# Patient Record
Sex: FEMALE | Race: BLACK OR AFRICAN AMERICAN | Employment: OTHER | URBAN - METROPOLITAN AREA
[De-identification: names, ages, dates, MRNs, and addresses within clinical notes are randomized per-mention and may not be internally consistent; named-entity substitution may affect disease eponyms.]

---

## 2017-01-17 ENCOUNTER — OFFICE VISIT (OUTPATIENT)
Dept: CARDIOLOGY CLINIC | Age: 72
End: 2017-01-17

## 2017-01-17 VITALS
WEIGHT: 216 LBS | OXYGEN SATURATION: 100 % | SYSTOLIC BLOOD PRESSURE: 132 MMHG | HEIGHT: 65 IN | BODY MASS INDEX: 35.99 KG/M2 | DIASTOLIC BLOOD PRESSURE: 76 MMHG

## 2017-01-17 DIAGNOSIS — R94.31 ABNORMAL EKG: ICD-10-CM

## 2017-01-17 DIAGNOSIS — R01.1 HEART MURMUR, SYSTOLIC: Primary | ICD-10-CM

## 2017-01-17 DIAGNOSIS — R93.1 ABNORMAL ECHOCARDIOGRAM: ICD-10-CM

## 2017-01-17 NOTE — MR AVS SNAPSHOT
Visit Information Date & Time Provider Department Dept. Phone Encounter #  
 1/17/2017  3:20 PM Benton Mazariegos, [facility] [id number] Follow-up Instructions Return in about 1 year (around 1/17/2018), or if symptoms worsen or fail to improve. Your Appointments 2/16/2017  1:00 PM  
Follow Up with Adiel Raymond MD  
[facility] (--) Appt Note: mary Jiménez [phone] [address] [address] [phone]  
  
   
 Jessy 57 [phone] [address] [address] Upcoming Health Maintenance Date Due DTaP/Tdap/Td series (1 - Tdap) 10/24/1967 ZOSTER VACCINE AGE 60> 10/24/2006 INFLUENZA AGE 9 TO ADULT 8/1/2016 Hepatitis C Screening 3/3/2017* Pneumococcal 65+ Low/Medium Risk (2 of 2 - PCV13) 3/3/2017 MEDICARE YEARLY EXAM 3/4/2017 BREAST CANCER SCRN MAMMOGRAM 10/15/2017 GLAUCOMA SCREENING Q2Y 1/4/2018 COLONOSCOPY 5/25/2021 *Topic was postponed. The date shown is not the original due date. Allergies as of 1/17/2017  Review Complete On: 1/17/2017 By: Benton Mazariegos, DO No Known Allergies Current Immunizations  Reviewed on 7/14/2016 Name Date Pneumococcal Polysaccharide (PPSV-23) 3/3/2016 Not reviewed this visit You Were Diagnosed With   
  
 Codes Comments Abnormal echocardiogram    -  Primary ICD-10-CM: R93.1 ICD-9-CM: 793.2 Abnormal EKG     ICD-10-CM: R94.31 
ICD-9-CM: 794.31 Benign hypertension     ICD-10-CM: I10 
ICD-9-CM: 401.1 Dyslipidemia     ICD-10-CM: E78.5 ICD-9-CM: 272.4 Vitals BP Pulse Height(growth percentile) Weight(growth percentile) SpO2 BMI  
 142/76 (P) 66 5' 4.5\" (1.638 m) 216 lb (98 kg) 100% 36.5 kg/m2 OB Status Smoking Status Hysterectomy Former Smoker BMI and BSA Data Body Mass Index Body Surface Area  
 36.5 kg/m 2 2.11 m 2 Preferred Pharmacy Pharmacy Name Phone [address] [phone] Your Updated Medication List  
  
   
This list is accurate as of: 1/17/17  4:22 PM.  Always use your most recent med list.  
  
  
  
  
 ADULT LOW DOSE ASPIRIN 81 mg tablet Generic drug:  aspirin delayed-release Take 1 Tab by mouth daily. cholecalciferol 1,000 unit Cap Commonly known as:  VITAMIN D3 Take 1,000 Units by mouth every other day. oxyCODONE-acetaminophen 5-325 mg per tablet Commonly known as:  PERCOCET One or two tab q 4 h prn pain  
  
 raloxifene 60 mg tablet Commonly known as:  EVISTA 60 mg daily. We Performed the Following AMB POC EKG ROUTINE W/ 12 LEADS, INTER & REP [99330 CPT(R)] Follow-up Instructions Return in about 1 year (around 1/17/2018), or if symptoms worsen or fail to improve. Introducing [facility] & HEALTH SERVICES! Anisa Weiss introduces [employer] patient portal. Now you can access parts of your medical record, email your doctor's office, and request medication refills online. 1. In your internet browser, go to [email or web address] 2. Click on the First Time User? Click Here link in the Sign In box. You will see the New Member Sign Up page. 3. Enter your [employer] Access Code exactly as it appears below. You will not need to use this code after youve completed the sign-up process. If you do not sign up before the expiration date, you must request a new code. · [employer] Access Code: [email or web address] Expires: 2/13/2017  9:52 AM 
 
4. Enter the last four digits of your Social Security Number (xxxx) and Date of Birth (mm/dd/yyyy) as indicated and click Submit. You will be taken to the next sign-up page. 5. Create a [employer] ID. This will be your [employer] login ID and cannot be changed, so think of one that is secure and easy to remember. 6. Create a [employer] password. You can change your password at any time. 7. Enter your Password Reset Question and Answer. This can be used at a later time if you forget your password. 8. Enter your e-mail address. You will receive e-mail notification when new information is available in [address] 9. Click Sign Up. You can now view and download portions of your medical record. 10. Click the Download Summary menu link to download a portable copy of your medical information. If you have questions, please visit the Frequently Asked Questions section of the [email or web address] website. Remember, [email or web address] is NOT to be used for urgent needs. For medical emergencies, dial 911. Now available from your iPhone and Android! Please provide this summary of care documentation to your next provider. Your primary care clinician is listed as RONA MIRANDA. If you have any questions after today's visit, please call [phone].

## 2017-01-17 NOTE — PROGRESS NOTES
1. Have you been to the ER, urgent care clinic since your last visit? Hospitalized since your last visit? No    2. Have you seen or consulted any other health care providers outside of the [address] [place] [address] since your last visit? Include any pap smears or colon screening.  No

## 2017-01-17 NOTE — PROGRESS NOTES
HPI: I saw Juan Diego Peoples in my office today in cardiovascular evaluation regarding her abnormal echocardiogram.  Ms. Zoya Lozoya is a pleasant 73-year-old -American female with history of having an abnormal echocardiogram and a soft systolic murmur for which she was referred to my associate Dr. Roque Cabezas. Dr. Roque Cabezas saw her on November 17, 2016 and thought that her heart murmur was simply a flow murmur and reviewing the echocardiogram of September 23, 2016 the only significant abnormality was moderate to severe left atrial enlargement of unclear etiology. He decided to repeat a limited echocardiogram which was completed on December 2, 2016 and suggested that there was only upper normal left atrial size at that time with mild mitral regurgitation with completely normal left ventricular function and in fact the left atrial size was not significantly enlarged as had  been suggested on the previous echo done in September 2016. The patient comes into the office today and relates that she is really doing quite well. She is not having any cardiovascular symptomatology at this time. Encounter Diagnoses   Name Primary?  Heart murmur, systolic ejection flow murmur Yes    Abnormal echocardiogram     Abnormal EKG         Discussion: This lady appears to be doing reasonably well at this juncture. She only has a very mildly abnormal echocardiogram with some borderline left atrial enlargement and mild mitral regurgitation and in fact her heart murmur is only heard at the base of the heart suggesting that it is a flow murmur and not related to the mitral regurgitation. Her latest lipid profile which was completed on September 23, 2016 showed a total cholesterol of 177 with triglycerides of 68, and HDL of 77, and LDL of 86.4, and a VLDL of 13.6 which is really only borderline hyperlipidemia and with a high HDL I do not think would be considered for treatment in a patient without any significant risk factors.     Her EKG shows some nonspecific EKG changes but nothing of concern and she tells me that historically she is never had any high blood pressure or any cardiac issues and I rechecked her blood pressure myself and got 132/76 so since she does not appear to have any significant cardiac issues I told her follow-up closely with Dr. Keny Vallecillo for general medical care and will simply plan to see her either annually or on an as-needed basis in the future. 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 EF 65%. No RWMA. LA size was upper limits of normal (decreased in size since prev study of 9/23/16). Mild MR. Past Surgical History   Procedure Laterality Date    Hx hysterectomy      Colonoscopy N/A 5/19/2016     COLONOSCOPY with polypectomy performed by Miguel Samayoa MD [facility] ENDOSCOPY       Current Outpatient Prescriptions   Medication Sig    aspirin delayed-release (ADULT LOW DOSE ASPIRIN) 81 mg tablet Take 1 Tab by mouth daily.  oxyCODONE-acetaminophen (PERCOCET) 5-325 mg per tablet One or two tab q 4 h prn pain    cholecalciferol (VITAMIN D3) 1,000 unit cap Take 1,000 Units by mouth every other day.  raloxifene (EVISTA) 60 mg tablet 60 mg daily. No current facility-administered medications for this visit. No Known Allergies    Social History :  Social History   Substance Use Topics    Smoking status: Former Smoker     Years: 13.00    Smokeless tobacco: Never Used      Comment: occasional, stopped at age 37    Alcohol use No        Family History: family history includes Hypertension in her mother; Prostate Cancer in her father.       Review of Systems:      Physical Exam:    The patient is a cooperative, alert, well developed, well nourished 80 y.o. black female who is in no acute distress at the time of the examination. Visit Vitals    /76    Pulse (P) 66    Ht 5' 4.5\" (1.638 m)    Wt 98 kg (216 lb)    SpO2 100%    BMI 36.5 kg/m2       HEENT: Conjuctiva white, mucosa moist, no pallor or cyanosis. NECK: Supple without masses, tenderness or thyromegaly. There was no jugular venous distention. Carotid are full bilaterally without bruits. CHEST: Symmetrical with good excursion. LUNGS: Clear to auscultation in all fields. HEART: The apex is not displaced. There were no lifts, thrills or heaves. There is anormal S1 and S2. There is a grade 2/6 harsh systolic ejection murmur heard along left sternal border with radiation to the base without appreciable murmurs, rubs, clicks, or gallops auscultated. ABDOMEN: Soft without masses, tenderness or organomegaly. EXTREMITIES: Full peripheral pulses without peripheral edema. INTEGUMENT: Warm and dry   NEUROLOGICAL: The patient is oriented x 3 with motor function grossly intact. Review of Data: See PMH and Cardiology and Imaging sections for cardiac testing  Lab Results   Component Value Date/Time    Cholesterol, total 177 09/23/2016 12:25 PM    HDL Cholesterol 77 09/23/2016 12:25 PM    LDL, calculated 86.4 09/23/2016 12:25 PM    Triglyceride 68 09/23/2016 12:25 PM    CHOL/HDL Ratio 2.3 09/23/2016 12:25 PM       Results for orders placed or performed in visit on 01/17/17   AMB POC EKG ROUTINE W/ 12 LEADS, INTER & REP     Status: None    Narrative    Normal sinus rhythm, rate 66. Some diffuse inferolateral and high lateral ST-T flattening. This EKG is similar to the EKG of November 17, 2016. Ceci Portillo D.O., F.A.C.C. Cardiovascular Specialists  [facility] [place]  [address]      PLEASE NOTE:  This document has been produced using voice recognition software. Unrecognized errors in transcription may be present.

## 2017-02-16 ENCOUNTER — OFFICE VISIT (OUTPATIENT)
Dept: FAMILY MEDICINE CLINIC | Age: 72
End: 2017-02-16

## 2017-02-16 VITALS
HEIGHT: 65 IN | TEMPERATURE: 97.6 F | OXYGEN SATURATION: 98 % | HEART RATE: 69 BPM | SYSTOLIC BLOOD PRESSURE: 134 MMHG | DIASTOLIC BLOOD PRESSURE: 85 MMHG | WEIGHT: 213 LBS | BODY MASS INDEX: 35.49 KG/M2

## 2017-02-16 DIAGNOSIS — R93.1 ABNORMAL ECHOCARDIOGRAM: ICD-10-CM

## 2017-02-16 NOTE — PROGRESS NOTES
Chief Complaint   Patient presents with    Hypertension     forgot to get BP reading     1. Have you been to the ER, urgent care clinic since your last visit? Hospitalized since your last visit? No    2. Have you seen or consulted any other health care providers outside of the [address] [place] [address] since your last visit? Include any pap smears or colon screening. No  Pt declined influenza vaccine, discussed medicare wellness visit with pt, due 3/2016. Pt states she will make apt.

## 2017-02-16 NOTE — PROGRESS NOTES
HISTORY OF PRESENT ILLNESS  Danii Sargent is a 80 y.o. female. Chief Complaint   Patient presents with    Hypertension     forgot to get BP reading   no htn dx or meds, blood pressure normal at cardiology appt, had ekg and told did not need anything else done for echo/LAE. 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 EF 65%. No RWMA. LA size was upper limits of normal (decreased in size since prev study of 9/23/16). Mild MR. Current Outpatient Prescriptions   Medication Sig Dispense Refill    aspirin delayed-release (ADULT LOW DOSE ASPIRIN) 81 mg tablet Take 1 Tab by mouth daily. 30 Tab prn    cholecalciferol (VITAMIN D3) 1,000 unit cap Take 1,000 Units by mouth every other day.  raloxifene (EVISTA) 60 mg tablet 60 mg daily. 0    oxyCODONE-acetaminophen (PERCOCET) 5-325 mg per tablet One or two tab q 4 h prn pain 40 Tab 0     No Known Allergies    ROS Respiratory: Negative for shortness of breath. Cardiovascular: Negative for chest pain. Genitourinary: Negative for frequency. Neurological: Negative for dizziness and headaches. Visit Vitals    /85    Pulse 69    Temp 97.6 °F (36.4 °C) (Oral)    Ht 5' 4.5\" (1.638 m)    Wt 213 lb (96.6 kg)    SpO2 98%    BMI 36 kg/m2       Physical Exam  Nursing note and vitals reviewed. Constitutional: She is oriented to person, place, and time. She appears well-developed and well-nourished. No distress. HENT:   Mouth/Throat: Oropharynx is clear and moist.   Neck: No JVD present. No thyromegaly present. Cardiovascular: Normal rate, regular rhythm and normal heart sounds. Pulmonary/Chest: Effort normal and breath sounds normal. No respiratory distress. She has no wheezes. She has no rales. Musculoskeletal: She exhibits no edema.    Lymphadenopathy: She has no cervical adenopathy. Neurological: She is alert and oriented to person, place, and time. Coordination normal.   Psychiatric: She has a normal mood and affect. Her behavior is normal.    ASSESSMENT and PLAN    ICD-10-CM ICD-9-CM    1. Elevated blood pressure I10 401.9    2. Abnormal echocardiogram R93.1 793.2    cleared by cardiology. , blood pressure normal today monitor still   Follow-up Disposition:  Return in about 2 months (around 4/16/2017) for AWV.

## 2017-02-16 NOTE — MR AVS SNAPSHOT
Visit Information Date & Time Provider Department Dept. Phone Encounter #  
 2/16/2017  1:00 PM Ariana Cortes [id number] Follow-up Instructions Return in about 2 months (around 4/16/2017) for AWV. Upcoming Health Maintenance Date Due DTaP/Tdap/Td series (1 - Tdap) 10/24/1967 ZOSTER VACCINE AGE 60> 10/24/2006 Hepatitis C Screening 3/3/2017* Pneumococcal 65+ Low/Medium Risk (2 of 2 - PCV13) 3/3/2017 MEDICARE YEARLY EXAM 3/4/2017 BREAST CANCER SCRN MAMMOGRAM 10/15/2017 GLAUCOMA SCREENING Q2Y 1/4/2018 COLONOSCOPY 5/25/2021 *Topic was postponed. The date shown is not the original due date. Allergies as of 2/16/2017  Review Complete On: 2/16/2017 By: Luiz Deleon LPN No Known Allergies Current Immunizations  Reviewed on 7/14/2016 Name Date Pneumococcal Polysaccharide (PPSV-23) 3/3/2016 Not reviewed this visit You Were Diagnosed With   
  
 Codes Comments Elevated blood pressure    -  Primary ICD-10-CM: I10 
ICD-9-CM: 401.9 Abnormal echocardiogram     ICD-10-CM: R93.1 ICD-9-CM: 793.2 Vitals BP Pulse Temp Height(growth percentile) Weight(growth percentile) SpO2  
 134/85 69 97.6 °F (36.4 °C) (Oral) 5' 4.5\" (1.638 m) 213 lb (96.6 kg) 98% BMI OB Status Smoking Status 39 kg/m2 Hysterectomy Former Smoker BMI and BSA Data Body Mass Index Body Surface Area  
 36 kg/m 2 2.1 m 2 Preferred Pharmacy Pharmacy Name Phone [address], [address] [place] [address] [phone] Your Updated Medication List  
  
   
This list is accurate as of: 2/16/17  1:40 PM.  Always use your most recent med list.  
  
  
  
  
 ADULT LOW DOSE ASPIRIN 81 mg tablet Generic drug:  aspirin delayed-release Take 1 Tab by mouth daily. cholecalciferol 1,000 unit Cap Commonly known as:  VITAMIN D3  
 Take 1,000 Units by mouth every other day. oxyCODONE-acetaminophen 5-325 mg per tablet Commonly known as:  PERCOCET One or two tab q 4 h prn pain  
  
 raloxifene 60 mg tablet Commonly known as:  EVISTA 60 mg daily. Follow-up Instructions Return in about 2 months (around 4/16/2017) for AWV. Patient Instructions Please contact our office if you have any questions about your visit today. Introducing [facility] & HEALTH SERVICES! Dee Amado introduces [employer] patient portal. Now you can access parts of your medical record, email your doctor's office, and request medication refills online. 1. In your internet browser, go to [email or web address] 2. Click on the First Time User? Click Here link in the Sign In box. You will see the New Member Sign Up page. 3. Enter your [employer] Access Code exactly as it appears below. You will not need to use this code after youve completed the sign-up process. If you do not sign up before the expiration date, you must request a new code. · [employer] Access Code: [email or web address] Expires: 5/14/2017 11:28 AM 
 
4. Enter the last four digits of your Social Security Number (xxxx) and Date of Birth (mm/dd/yyyy) as indicated and click Submit. You will be taken to the next sign-up page. 5. Create a H[employer]ja ID. This will be your [employer] login ID and cannot be changed, so think of one that is secure and easy to remember. 6. Create a [employer]ja password. You can change your password at any time. 7. Enter your Password Reset Question and Answer. This can be used at a later time if you forget your password. 8. Enter your e-mail address. You will receive e-mail notification when new information is available in [address] 9. Click Sign Up. You can now view and download portions of your medical record. 10. Click the Download Summary menu link to download a portable copy of your medical information. If you have questions, please visit the Frequently Asked Questions section of the [employer]Gabt website. Remember, [employer] is NOT to be used for urgent needs. For medical emergencies, dial 911. Now available from your iPhone and Android! Please provide this summary of care documentation to your next provider. Your primary care clinician is listed as RONA MIRANDA. If you have any questions after today's visit, please call [phone].

## 2017-03-13 ENCOUNTER — OFFICE VISIT (OUTPATIENT)
Dept: FAMILY MEDICINE CLINIC | Age: 72
End: 2017-03-13

## 2017-03-13 VITALS
DIASTOLIC BLOOD PRESSURE: 72 MMHG | HEART RATE: 67 BPM | TEMPERATURE: 97.3 F | WEIGHT: 217.5 LBS | BODY MASS INDEX: 36.24 KG/M2 | HEIGHT: 65 IN | SYSTOLIC BLOOD PRESSURE: 126 MMHG | RESPIRATION RATE: 16 BRPM

## 2017-03-13 DIAGNOSIS — H65.92 LEFT OTITIS MEDIA WITH EFFUSION: Primary | ICD-10-CM

## 2017-03-13 DIAGNOSIS — H92.02 OTALGIA, LEFT: ICD-10-CM

## 2017-03-13 RX ORDER — AMOXICILLIN AND CLAVULANATE POTASSIUM 875; 125 MG/1; MG/1
1 TABLET, FILM COATED ORAL EVERY 12 HOURS
Qty: 20 TAB | Refills: 0 | Status: SHIPPED | OUTPATIENT
Start: 2017-03-13 | End: 2017-03-23

## 2017-03-13 NOTE — PROGRESS NOTES
HISTORY OF PRESENT ILLNESS  Rubio Balderas is a 80 y.o. female. Chief Complaint   Patient presents with   [facility] Follow Up     Seen at [facility] on 3/11/17 for left ear pain that was radiating to the base of her 'skull' causing pain to back of her head. Still have the left ear throbbing. 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 EF 65%. No RWMA. LA size was upper limits of normal (decreased in size since prev study of 9/23/16). Mild MR. Current Outpatient Prescriptions   Medication Sig Dispense Refill    aspirin delayed-release (ADULT LOW DOSE ASPIRIN) 81 mg tablet Take 1 Tab by mouth daily. 30 Tab prn    cholecalciferol (VITAMIN D3) 1,000 unit cap Take 1,000 Units by mouth every other day.  raloxifene (EVISTA) 60 mg tablet 60 mg daily. 0     No Known Allergies    Review of Systems   Constitutional: Negative for chills and fever. HENT: Positive for ear pain. Negative for congestion and ear discharge. Respiratory: Negative for cough. Neurological: Positive for headaches. Visit Vitals    /72 (BP 1 Location: Right arm, BP Patient Position: Sitting)    Pulse 67    Temp 97.3 °F (36.3 °C) (Oral)    Resp 16    Ht 5' 4.5\" (1.638 m)    Wt 217 lb 8 oz (98.7 kg)    BMI 36.76 kg/m2       Physical Exam   Constitutional: She appears well-developed and well-nourished. No distress. HENT:   Right Ear: External ear normal. Tympanic membrane is not injected. No middle ear effusion. Left Ear: External ear normal. Tympanic membrane is injected. A middle ear effusion is present.    Mouth/Throat: Posterior oropharyngeal erythema present. No oropharyngeal exudate. Eyes: Conjunctivae and EOM are normal.   Cardiovascular: Normal rate, regular rhythm and normal heart sounds. Pulmonary/Chest: Effort normal and breath sounds normal. No respiratory distress. She has no wheezes. She has no rales. Abdominal: Soft. Bowel sounds are normal. She exhibits no distension. There is no tenderness. There is no rebound. Lymphadenopathy:     She has cervical adenopathy. Skin: No pallor. Nursing note and vitals reviewed. ASSESSMENT and PLAN    ICD-10-CM ICD-9-CM    1. Left otitis media with effusion H66.92 381.4 amoxicillin-clavulanate (AUGMENTIN) 875-125 mg per tablet  Also recommend Allegra D 12 hour WITH CAUTION FOR HBP and flonase as well     2. Otalgia, left H92.02 388.70    Follow-up Disposition:  Return in about 1 week (around 3/20/2017).

## 2017-03-13 NOTE — PROGRESS NOTES
Chief Complaint   Patient presents with   [facility] Follow Up     Seen at [facility] on 3/11/17 for left ear pain that was radiating to the base of her 'skull' causing pain to back of her head. Still have the left ear throbbing. Health Maintenance reviewed     1. Have you been to the ER, urgent care clinic since your last visit? Hospitalized since your last visit? Yes When: 3/17 Where: [facility] Reason for visit: ear pain    2. Have you seen or consulted any other health care providers outside of the [address] [place] [address] since your last visit? Include any pap smears or colon screening.  No

## 2017-03-13 NOTE — MR AVS SNAPSHOT
Visit Information Date & Time Provider Department Dept. Phone Encounter #  
 3/13/2017  3:15 PM Paco Guzman MD [address] [phone] [id number] Follow-up Instructions Return in about 1 week (around 3/20/2017). Your Appointments 4/13/2017  2:00 PM  
Office Visit with Paco Guzman MD  
[address] (--) Appt Note: Medicare Wellness eJssy Mcadams [address] [phone]  
  
   
 Jessy Sterlingroselynnasrin [address] Upcoming Health Maintenance Date Due Hepatitis C Screening 1945 DTaP/Tdap/Td series (1 - Tdap) 10/24/1967 ZOSTER VACCINE AGE 60> 10/24/2006 Pneumococcal 65+ Low/Medium Risk (2 of 2 - PCV13) 3/3/2017 MEDICARE YEARLY EXAM 3/4/2017 BREAST CANCER SCRN MAMMOGRAM 10/15/2017 GLAUCOMA SCREENING Q2Y 1/4/2018 COLONOSCOPY 5/25/2021 Allergies as of 3/13/2017  Review Complete On: 3/13/2017 By: Paco Guzman MD  
 No Known Allergies Current Immunizations  Reviewed on 7/14/2016 Name Date Pneumococcal Polysaccharide (PPSV-23) 3/3/2016 Not reviewed this visit You Were Diagnosed With   
  
 Codes Comments Left otitis media with effusion    -  Primary ICD-10-CM: Z22.83 
ICD-9-CM: 381.4 Otalgia, left     ICD-10-CM: H92.02 
ICD-9-CM: 388.70 Vitals BP Pulse Temp Resp Height(growth percentile) Weight(growth percentile) 126/72 (BP 1 Location: Right arm, BP Patient Position: Sitting) 67 97.3 °F (36.3 °C) (Oral) 16 5' 4.5\" (1.638 m) 217 lb 8 oz (98.7 kg) BMI OB Status Smoking Status 36.76 kg/m2 Hysterectomy Former Smoker BMI and BSA Data Body Mass Index Body Surface Area  
 36.76 kg/m 2 2.12 m 2 Preferred Pharmacy Pharmacy Name Phone [address] [phone] Your Updated Medication List  
  
   
 This list is accurate as of: 3/13/17  4:08 PM.  Always use your most recent med list.  
  
  
  
  
 ADULT LOW DOSE ASPIRIN 81 mg tablet Generic drug:  aspirin delayed-release Take 1 Tab by mouth daily. amoxicillin-clavulanate 875-125 mg per tablet Commonly known as:  AUGMENTIN Take 1 Tab by mouth every twelve (12) hours for 10 days. cholecalciferol 1,000 unit Cap Commonly known as:  VITAMIN D3 Take 1,000 Units by mouth every other day. raloxifene 60 mg tablet Commonly known as:  EVISTA 60 mg daily. Prescriptions Sent to Pharmacy Refills  
 amoxicillin-clavulanate (AUGMENTIN) 875-125 mg per tablet 0 Sig: Take 1 Tab by mouth every twelve (12) hours for 10 days. Class: Normal  
 Pharmacy: [facility], [address] [place] [address] #: [phone] Route: Oral  
  
Follow-up Instructions Return in about 1 week (around 3/20/2017). Patient Instructions Please contact our office if you have any questions about your visit today. Introducing [facility] & HEALTH SERVICES! Neno Blair introduces Active Circle patient portal. Now you can access parts of your medical record, email your doctor's office, and request medication refills online. 1. In your internet browser, go to [email or web address] 2. Click on the First Time User? Click Here link in the Sign In box. You will see the New Member Sign Up page. 3. Enter your Active Circle Access Code exactly as it appears below. You will not need to use this code after youve completed the sign-up process. If you do not sign up before the expiration date, you must request a new code. · Active Circle Access Code: [email or web address] Expires: 5/14/2017 12:28 PM 
 
4. Enter the last four digits of your Social Security Number (xxxx) and Date of Birth (mm/dd/yyyy) as indicated and click Submit. You will be taken to the next sign-up page. 5. Create a DataCrowd ID. This will be your DataCrowd login ID and cannot be changed, so think of one that is secure and easy to remember. 6. Create a DataCrowd password. You can change your password at any time. 7. Enter your Password Reset Question and Answer. This can be used at a later time if you forget your password. 8. Enter your e-mail address. You will receive e-mail notification when new information is available in [address] 9. Click Sign Up. You can now view and download portions of your medical record. 10. Click the Download Summary menu link to download a portable copy of your medical information. If you have questions, please visit the Frequently Asked Questions section of the DataCrowd website. Remember, DataCrowd is NOT to be used for urgent needs. For medical emergencies, dial 911. Now available from your iPhone and Android! Please provide this summary of care documentation to your next provider. Your primary care clinician is listed as RONA MIRANDA. If you have any questions after today's visit, please call [phone].

## 2017-03-20 ENCOUNTER — OFFICE VISIT (OUTPATIENT)
Dept: FAMILY MEDICINE CLINIC | Age: 72
End: 2017-03-20

## 2017-03-20 VITALS
BODY MASS INDEX: 36.07 KG/M2 | HEART RATE: 60 BPM | TEMPERATURE: 97.9 F | RESPIRATION RATE: 20 BRPM | DIASTOLIC BLOOD PRESSURE: 77 MMHG | HEIGHT: 65 IN | WEIGHT: 216.5 LBS | SYSTOLIC BLOOD PRESSURE: 137 MMHG

## 2017-03-20 DIAGNOSIS — H65.92 LEFT OTITIS MEDIA WITH EFFUSION: Primary | ICD-10-CM

## 2017-03-20 DIAGNOSIS — H92.03 OTALGIA OF BOTH EARS: ICD-10-CM

## 2017-03-20 NOTE — PROGRESS NOTES
Chief Complaint   Patient presents with    Follow-up     1 week f/u on otitis. Patient states ear is better but she now have a 'mild discomfort' to the back of her head on the right side. 1. Have you been to the ER, urgent care clinic since your last visit? Hospitalized since your last visit? No    2. Have you seen or consulted any other health care providers outside of the [address] [place] [address] since your last visit? Include any pap smears or colon screening.  No

## 2017-03-20 NOTE — MR AVS SNAPSHOT
Visit Information Date & Time Provider Department Dept. Phone Encounter #  
 3/20/2017  1:00 PM Corine Reilly [facility] [phone] [id number] Your Appointments 4/13/2017  2:00 PM  
Office Visit with Corine Reilly MD  
[facility] (--) Appt Note: Medicare Wellness [id number] [address] [address] [phone]  
  
   
 Jessy 57 [id number] [address] [address] Upcoming Health Maintenance Date Due Hepatitis C Screening 1945 DTaP/Tdap/Td series (1 - Tdap) 10/24/1967 ZOSTER VACCINE AGE 60> 10/24/2006 Pneumococcal 65+ Low/Medium Risk (2 of 2 - PCV13) 3/3/2017 MEDICARE YEARLY EXAM 3/4/2017 BREAST CANCER SCRN MAMMOGRAM 10/15/2017 GLAUCOMA SCREENING Q2Y 1/4/2018 COLONOSCOPY 5/25/2021 Allergies as of 3/20/2017  Review Complete On: 3/13/2017 By: Corine Reilly MD  
 No Known Allergies Current Immunizations  Reviewed on 7/14/2016 Name Date Pneumococcal Polysaccharide (PPSV-23) 3/3/2016 Not reviewed this visit You Were Diagnosed With   
  
 Codes Comments Left otitis media with effusion    -  Primary ICD-10-CM: X24.46 
ICD-9-CM: 381.4 Otalgia of both ears     ICD-10-CM: H92.03 
ICD-9-CM: 388.70 Vitals BP Pulse Temp Resp Height(growth percentile) Weight(growth percentile)  
 137/77 (BP 1 Location: Right arm, BP Patient Position: Sitting) 60 97.9 °F (36.6 °C) (Oral) 20 5' 4.5\" (1.638 m) 216 lb 8 oz (98.2 kg) BMI OB Status Smoking Status 36.59 kg/m2 Hysterectomy Former Smoker BMI and BSA Data Body Mass Index Body Surface Area  
 36.59 kg/m 2 2.11 m 2 Preferred Pharmacy Pharmacy Name Phone [address], [address] [place] [address] [phone] Your Updated Medication List  
  
   
This list is accurate as of: 3/20/17  1:39 PM.  Always use your most recent med list.  
  
  
  
  
 ADULT LOW DOSE ASPIRIN 81 mg tablet Generic drug:  aspirin delayed-release Take 1 Tab by mouth daily. amoxicillin-clavulanate 875-125 mg per tablet Commonly known as:  AUGMENTIN Take 1 Tab by mouth every twelve (12) hours for 10 days. cholecalciferol 1,000 unit Cap Commonly known as:  VITAMIN D3 Take 1,000 Units by mouth every other day. raloxifene 60 mg tablet Commonly known as:  EVISTA 60 mg daily. We Performed the Following REFERRAL TO ENT-OTOLARYNGOLOGY [ZTT63 Custom] Comments:  
 Please evaluate patient for otalgia, AR. Referral Information Referral ID Referred By Referred To  
  
 [id number] Rachael MIRANDA MD   
   [address] [place] [address] [address] [place], [facility] [address] Phone: [phone] Fax: [phone] Visits Status Start Date End Date 1 New Request 3/20/17 3/20/18 If your referral has a status of pending review or denied, additional information will be sent to support the outcome of this decision. Patient Instructions Please contact our office if you have any questions about your visit today. Introducing \[facility]\"" & HEALTH SERVICES! Patrick Booth introduces BabyGlowz patient portal. Now you can access parts of your medical record, email your doctor's office, and request medication refills online. 1. In your internet browser, go to [email or web address] 2. Click on the First Time User? Click Here link in the Sign In box. You will see the New Member Sign Up page. 3. Enter your BabyGlowz Access Code exactly as it appears below. You will not need to use this code after youve completed the sign-up process. If you do not sign up before the expiration date, you must request a new code. · BabyGlowz Access Code: [email or web address] Expires: 5/14/2017 12:28 PM 
 
4.  Enter the last four digits of your Social Security Number (xxxx) and Date of Birth (mm/dd/yyyy) as indicated and click Submit. You will be taken to the next sign-up page. 5. Create a PhotoMania ID. This will be your PhotoMania login ID and cannot be changed, so think of one that is secure and easy to remember. 6. Create a PhotoMania password. You can change your password at any time. 7. Enter your Password Reset Question and Answer. This can be used at a later time if you forget your password. 8. Enter your e-mail address. You will receive e-mail notification when new information is available in [address] 9. Click Sign Up. You can now view and download portions of your medical record. 10. Click the Download Summary menu link to download a portable copy of your medical information. If you have questions, please visit the Frequently Asked Questions section of the PhotoMania website. Remember, PhotoMania is NOT to be used for urgent needs. For medical emergencies, dial 911. Now available from your iPhone and Android! Please provide this summary of care documentation to your next provider. Your primary care clinician is listed as RONA MIRANDA. If you have any questions after today's visit, please call [phone].

## 2017-06-28 ENCOUNTER — OFFICE VISIT (OUTPATIENT)
Dept: FAMILY MEDICINE CLINIC | Age: 72
End: 2017-06-28

## 2017-06-28 VITALS
DIASTOLIC BLOOD PRESSURE: 80 MMHG | HEIGHT: 65 IN | HEART RATE: 63 BPM | SYSTOLIC BLOOD PRESSURE: 129 MMHG | RESPIRATION RATE: 16 BRPM | TEMPERATURE: 96.8 F | BODY MASS INDEX: 35.82 KG/M2 | WEIGHT: 215 LBS

## 2017-06-28 DIAGNOSIS — Z00.00 ROUTINE GENERAL MEDICAL EXAMINATION AT A HEALTH CARE FACILITY: Primary | ICD-10-CM

## 2017-06-28 DIAGNOSIS — L72.3 SEBACEOUS CYST: ICD-10-CM

## 2017-06-28 DIAGNOSIS — D12.6 TUBULAR ADENOMA OF COLON: ICD-10-CM

## 2017-06-28 DIAGNOSIS — Z13.6 ENCOUNTER FOR SCREENING FOR CARDIOVASCULAR DISORDERS: ICD-10-CM

## 2017-06-28 DIAGNOSIS — Z51.81 MEDICATION MONITORING ENCOUNTER: ICD-10-CM

## 2017-06-28 DIAGNOSIS — Z13.39 SCREENING FOR ALCOHOLISM: ICD-10-CM

## 2017-06-28 DIAGNOSIS — Z71.89 ADVANCED DIRECTIVES, COUNSELING/DISCUSSION: ICD-10-CM

## 2017-06-28 DIAGNOSIS — E55.9 VITAMIN D DEFICIENCY: ICD-10-CM

## 2017-06-28 DIAGNOSIS — R03.0 ELEVATED BLOOD PRESSURE READING: ICD-10-CM

## 2017-06-28 DIAGNOSIS — Z13.31 SCREENING FOR DEPRESSION: ICD-10-CM

## 2017-06-28 DIAGNOSIS — Z13.220 SCREENING CHOLESTEROL LEVEL: ICD-10-CM

## 2017-06-28 DIAGNOSIS — Z12.11 SCREEN FOR COLON CANCER: ICD-10-CM

## 2017-06-28 DIAGNOSIS — Z71.89 ACP (ADVANCE CARE PLANNING): ICD-10-CM

## 2017-06-28 DIAGNOSIS — Z11.59 NEED FOR HEPATITIS C SCREENING TEST: ICD-10-CM

## 2017-06-28 DIAGNOSIS — R05.9 COUGH: ICD-10-CM

## 2017-06-28 RX ORDER — CEPHALEXIN 500 MG/1
500 CAPSULE ORAL 3 TIMES DAILY
Qty: 30 CAP | Refills: 0 | Status: SHIPPED | OUTPATIENT
Start: 2017-06-28 | End: 2017-07-08

## 2017-06-28 NOTE — PATIENT INSTRUCTIONS
Please contact our office if you have any questions about your visit today. Medicare Part B Preventive Services Limitations Recommendation Scheduled   Bone Mass Measurement  (age 72 & older, biennial) Requires diagnosis related to osteoporosis or estrogen deficiency. Biennial benefit unless patient has history of long-term glucocorticoid tx or baseline is needed because initial test was by other method     Cardiovascular Screening Blood Tests (every 5 years)  Total cholesterol, HDL, Triglycerides Order as a panel if possible     Colorectal Cancer Screening  -Fecal occult blood test (annual)  -Flexible sigmoidoscopy (5y)  -Screening colonoscopy (10y)  -Barium Enema      Counseling to Prevent Tobacco Use (up to 8 sessions per year)  - Counseling greater than 3 and up to 10 minutes  - Counseling greater than 10 minutes Patients must be asymptomatic of tobacco-related conditions to receive as preventive service     Diabetes Screening Tests (at least every 3 years, Medicare covers annually or at 6-month intervals for prediabetic patients)    Fasting blood sugar (FBS) or glucose tolerance test (GTT) Patient must be diagnosed with one of the following:  -Hypertension, Dyslipidemia, obesity, previous impaired FBS or GTT  Or any two of the following: overweight, FH of diabetes, age ? 72, history of gestational diabetes, birth of baby weighing more than 9 pounds     Diabetes Self-Management Training (DSMT) (no USPSTF recommendation) Requires referral by treating physician for patient with diabetes or renal disease. 10 hours of initial DSMT session of no less than 30 minutes each in a continuous 12-month period. 2 hours of follow-up DSMT in subsequent years.      Glaucoma Screening (no USPSTF recommendation) Diabetes mellitus, family history, , age 48 or over,  American, age 72 or over     Human Immunodeficiency Virus (HIV) Screening (annually for increased risk patients)  HIV-1 and HIV-2 by EIA, PETE, rapid antibody test, or oral mucosa transudate Patient must be at increased risk for HIV infection per USPSTF guidelines or pregnant. Tests covered annually for patients at increased risk. Pregnant patients may receive up to 3 test during pregnancy. Medical Nutrition Therapy (MNT) (for diabetes or renal disease not recommended schedule) Requires referral by treating physician for patient with diabetes or renal disease. Can be provided in same year as diabetes self-management training (DSMT), and CMS recommends medical nutrition therapy take place after DSMT. Up to 3 hours for initial year and 2 hours in subsequent years. Shingles Vaccination A shingles vaccine is also recommended once in a lifetime after age 61     Seasonal Influenza Vaccination (annually)      Pneumococcal Vaccination (once after 72)      Hepatitis B Vaccinations (if medium/high risk) Medium/high risk factors:  End-stage renal disease,  Hemophiliacs who received Factor VIII or IX concentrates, Clients of institutions for the mentally retarded, Persons who live in the same house as a HepB virus carrier, Homosexual men, Illicit injectable drug abusers. Screening Mammography (biennial age 54-69) Annually (age 36 or over)     Screening Pap Tests and Pelvic Examination (up to age 79 and after 79 if unknown history or abnormal study last 10 years) Every 25 months except high risk     Ultrasound Screening for Abdominal Aortic Aneurysm (AAA) (once) Patient must be referred through [facility] and not have had a screening for abdominal aortic aneurysm before under Medicare.   Limited to patients who meet one of the following criteria:  - Men who are 73-68 years old and have smoked more than 100 cigarettes in their lifetime.  -Anyone with a FH of AAA  -Anyone recommended for screening by USPSTF

## 2017-06-28 NOTE — MR AVS SNAPSHOT
Visit Information Date & Time Provider Department Dept. Phone Encounter #  
 6/28/2017  8:30 AM Giselle Urias MD [address] [phone] [id number] Follow-up Instructions Return in about 3 months (around 9/28/2017). Upcoming Health Maintenance Date Due Hepatitis C Screening 1945 DTaP/Tdap/Td series (1 - Tdap) 10/24/1967 Pneumococcal 65+ Low/Medium Risk (2 of 2 - PCV13) 3/3/2017 MEDICARE YEARLY EXAM 3/4/2017 INFLUENZA AGE 9 TO ADULT 8/1/2017 GLAUCOMA SCREENING Q2Y 1/4/2018 BREAST CANCER SCRN MAMMOGRAM 6/28/2019 COLONOSCOPY 5/25/2021 Allergies as of 6/28/2017  Review Complete On: 3/20/2017 By: Giselle Urias MD  
 No Known Allergies Current Immunizations  Reviewed on 6/28/2017 Name Date Pneumococcal Polysaccharide (PPSV-23) 3/3/2016 Reviewed by Giselle Urias MD on 6/28/2017 at  9:36 AM  
You Were Diagnosed With   
  
 Codes Comments Routine general medical examination at a health care facility    -  Primary ICD-10-CM: Z00.00 ICD-9-CM: V70.0 Screening for depression     ICD-10-CM: Z13.89 ICD-9-CM: V79.0 Screening for alcoholism     ICD-10-CM: Z13.89 ICD-9-CM: V79.1 Screen for colon cancer     ICD-10-CM: Z12.11 ICD-9-CM: V76.51 Tubular adenoma of colon     ICD-10-CM: D12.6 ICD-9-CM: 211.3 Advanced directives, counseling/discussion     ICD-10-CM: Z71.89 ICD-9-CM: V65.49   
 ACP (advance care planning)     ICD-10-CM: Z71.89 ICD-9-CM: V65.49 Cough     ICD-10-CM: R05 ICD-9-CM: 786.2 Sebaceous cyst     ICD-10-CM: L72.3 ICD-9-CM: 706.2 Need for hepatitis C screening test     ICD-10-CM: Z11.59 
ICD-9-CM: V73.89 Medication monitoring encounter     ICD-10-CM: Z51.81 
ICD-9-CM: V58.83 Vitamin D deficiency     ICD-10-CM: E55.9 ICD-9-CM: 268.9 Screening cholesterol level     ICD-10-CM: E58.703 ICD-9-CM: V77.91 Elevated blood pressure reading     ICD-10-CM: R03.0 ICD-9-CM: 796.2 Encounter for screening for cardiovascular disorders     ICD-10-CM: Z13.6 ICD-9-CM: V81.2 Vitals BP Pulse Temp Resp Height(growth percentile) Weight(growth percentile) 129/80 (BP 1 Location: Left arm, BP Patient Position: Sitting) 63 96.8 °F (36 °C) (Oral) 16 5' 4.5\" (1.638 m) 215 lb (97.5 kg) BMI OB Status Smoking Status 36.33 kg/m2 Hysterectomy Former Smoker Vitals History BMI and BSA Data Body Mass Index Body Surface Area  
 36.33 kg/m 2 2.11 m 2 Preferred Pharmacy Pharmacy Name Phone [address], [address] [place] [address] [phone] Your Updated Medication List  
  
   
This list is accurate as of: 6/28/17  9:58 AM.  Always use your most recent med list.  
  
  
  
  
 ADULT LOW DOSE ASPIRIN 81 mg tablet Generic drug:  aspirin delayed-release Take 1 Tab by mouth daily. cephALEXin 500 mg capsule Commonly known as:  Amor Carlie Take 1 Cap by mouth three (3) times daily for 10 days. cholecalciferol 1,000 unit Cap Commonly known as:  VITAMIN D3 Take 1,000 Units by mouth every other day. raloxifene 60 mg tablet Commonly known as:  EVISTA 60 mg daily. Prescriptions Sent to Pharmacy Refills  
 cephALEXin (KEFLEX) 500 mg capsule 0 Sig: Take 1 Cap by mouth three (3) times daily for 10 days. Class: Normal  
 Pharmacy: [facility]ell[facility], [address] [place] [address] #: [phone] Route: Oral  
  
We Performed the Following ADVANCE CARE PLANNING FIRST 30 MINS [45586 CPT(R)] G[address] 68 [[id number] [facility]] Follow-up Instructions Return in about 3 months (around 9/28/2017). To-Do List   
 06/28/2017 Lab:  CBC WITH AUTOMATED DIFF   
  
 06/28/2017 Lab:  HEPATITIS C AB   
  
 06/28/2017 Lab:  LIPID PANEL   
  
 06/28/2017 Lab:  METABOLIC PANEL, COMPREHENSIVE   
  
 06/28/2017 Lab:  OCCULT BLOOD, IMMUNOASSAY (FIT)   
  
 06/28/2017 Lab:  VITAMIN D, 25 HYDROXY Patient Instructions Please contact our office if you have any questions about your visit today. Medicare Part B Preventive Services Limitations Recommendation Scheduled Bone Mass Measurement 
(age 72 & older, biennial) Requires diagnosis related to osteoporosis or estrogen deficiency. Biennial benefit unless patient has history of long-term glucocorticoid tx or baseline is needed because initial test was by other method Cardiovascular Screening Blood Tests (every 5 years) Total cholesterol, HDL, Triglycerides Order as a panel if possible Colorectal Cancer Screening 
-Fecal occult blood test (annual) -Flexible sigmoidoscopy (5y) 
-Screening colonoscopy (10y) -Barium Enema Counseling to Prevent Tobacco Use (up to 8 sessions per year) - Counseling greater than 3 and up to 10 minutes - Counseling greater than 10 minutes Patients must be asymptomatic of tobacco-related conditions to receive as preventive service Diabetes Screening Tests (at least every 3 years, Medicare covers annually or at 6-month intervals for prediabetic patients) Fasting blood sugar (FBS) or glucose tolerance test (GTT) Patient must be diagnosed with one of the following: 
-Hypertension, Dyslipidemia, obesity, previous impaired FBS or GTT 
Or any two of the following: overweight, FH of diabetes, age ? 72, history of gestational diabetes, birth of baby weighing more than 9 pounds Diabetes Self-Management Training (DSMT) (no USPSTF recommendation) Requires referral by treating physician for patient with diabetes or renal disease. 10 hours of initial DSMT session of no less than 30 minutes each in a continuous 12-month period. 2 hours of follow-up DSMT in subsequent years.     
Glaucoma Screening (no USPSTF recommendation) Diabetes mellitus, family history, , age 48 or over,  American, age 72 or over Human Immunodeficiency Virus (HIV) Screening (annually for increased risk patients) HIV-1 and HIV-2 by EIA, PETE, rapid antibody test, or oral mucosa transudate Patient must be at increased risk for HIV infection per USPSTF guidelines or pregnant. Tests covered annually for patients at increased risk. Pregnant patients may receive up to 3 test during pregnancy. Medical Nutrition Therapy (MNT) (for diabetes or renal disease not recommended schedule) Requires referral by treating physician for patient with diabetes or renal disease. Can be provided in same year as diabetes self-management training (DSMT), and CMS recommends medical nutrition therapy take place after DSMT. Up to 3 hours for initial year and 2 hours in subsequent years. Shingles Vaccination A shingles vaccine is also recommended once in a lifetime after age 61 Seasonal Influenza Vaccination (annually) Pneumococcal Vaccination (once after 65) Hepatitis B Vaccinations (if medium/high risk) Medium/high risk factors:  End-stage renal disease, Hemophiliacs who received Factor VIII or IX concentrates, Clients of institutions for the mentally retarded, Persons who live in the same house as a HepB virus carrier, Homosexual men, Illicit injectable drug abusers. Screening Mammography (biennial age 54-69) Annually (age 36 or over) Screening Pap Tests and Pelvic Examination (up to age 79 and after 79 if unknown history or abnormal study last 10 years) Every 24 months except high risk Ultrasound Screening for Abdominal Aortic Aneurysm (AAA) (once) Patient must be referred through [facility] and not have had a screening for abdominal aortic aneurysm before under Medicare. Limited to patients who meet one of the following criteria: 
- Men who are 73-68 years old and have smoked more than 100 cigarettes in their lifetime. 
-Anyone with a FH of AAA -Anyone recommended for screening by USPSTF Introducing [facility] & HEALTH SERVICES! Ingrid Lanza introduces IndiaIdeas patient portal. Now you can access parts of your medical record, email your doctor's office, and request medication refills online. 1. In your internet browser, go to [email or web address] 2. Click on the First Time User? Click Here link in the Sign In box. You will see the New Member Sign Up page. 3. Enter your IndiaIdeas Access Code exactly as it appears below. You will not need to use this code after youve completed the sign-up process. If you do not sign up before the expiration date, you must request a new code. · IndiaIdeas Access Code: [email or web address] Expires: 9/26/2017  8:40 AM 
 
4. Enter the last four digits of your Social Security Number (xxxx) and Date of Birth (mm/dd/yyyy) as indicated and click Submit. You will be taken to the next sign-up page. 5. Create a IndiaIdeas ID. This will be your IndiaIdeas login ID and cannot be changed, so think of one that is secure and easy to remember. 6. Create a IndiaIdeas password. You can change your password at any time. 7. Enter your Password Reset Question and Answer. This can be used at a later time if you forget your password. 8. Enter your e-mail address. You will receive e-mail notification when new information is available in [address] 9. Click Sign Up. You can now view and download portions of your medical record. 10. Click the Download Summary menu link to download a portable copy of your medical information. If you have questions, please visit the Frequently Asked Questions section of the IndiaIdeas website. Remember, IndiaIdeas is NOT to be used for urgent needs. For medical emergencies, dial 911. Now available from your iPhone and Android! Please provide this summary of care documentation to your next provider. Your primary care clinician is listed as RONA MIRANDA.  If you have any questions after today's visit, please call [phone].

## 2017-06-28 NOTE — PROGRESS NOTES
Chief Complaint   Patient presents with   Jamee Archibald Annual Wellness Visit     Medicare       Health Maintenance Due   Topic Date Due    Hepatitis C Screening  1945    DTaP/Tdap/Td series (1 - Tdap) 10/24/1967    ZOSTER VACCINE AGE 60>  10/24/2006    Pneumococcal 65+ Low/Medium Risk (2 of 2 - PCV13) 03/03/2017    MEDICARE YEARLY EXAM  03/04/2017    BREAST CANCER SCRN MAMMOGRAM  10/15/2017       Health Maintenance reviewed    1. Have you been to the ER, urgent care clinic since your last visit? Hospitalized since your last visit? No    2. Have you seen or consulted any other health care providers outside of the [address] [place] [address] since your last visit? Include any pap smears or colon screening. No    This is a Subsequent Medicare Annual Wellness Visit providing Personalized Prevention Plan Services (PPPS) (Performed 12 months after initial AWV and PPPS )    I have reviewed the patient's medical history in detail and updated the computerized patient record. 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 EF 65%. No RWMA. LA size was upper limits of normal (decreased in size since prev study of 9/23/16). Mild MR. Past Surgical History:   Procedure Laterality Date    COLONOSCOPY N/A 5/19/2016    COLONOSCOPY with polypectomy performed by Maggie Galan [facility] at [facility] ENDOSCOPY    HX HYSTERECTOMY       Current Outpatient Prescriptions   Medication Sig Dispense Refill    aspirin delayed-release (ADULT LOW DOSE ASPIRIN) 81 mg tablet Take 1 Tab by mouth daily. 30 Tab prn    cholecalciferol (VITAMIN D3) 1,000 unit cap Take 1,000 Units by mouth every other day.  raloxifene (EVISTA) 60 mg tablet 60 mg daily.   0     No Known Allergies  Family History   Problem Relation Age of Onset    Hypertension Mother     Prostate Cancer Father Social History   Substance Use Topics    Smoking status: Former Smoker     Years: 13.00    Smokeless tobacco: Never Used      Comment: occasional, stopped at age 37    Alcohol use No     Patient Active Problem List   Diagnosis Code    AGE (acute gastroenteritis) K52.9    Colon polyp, colonoscopy, 1/2011 K63.5    Elevated blood pressure [id number]    Breast pain, left N64.4    Abnormal EKG R94.31    ACP (advance care planning) Z71.89    Osteoarthritis of lumbar spine M47.816    Tubular adenoma of colon, colonscopy 5/2016 D12.6    Abnormal echocardiogram R93.1    Hypertension I10    Dyslipidemia E78.5       Depression Risk Factor Screening:     PHQ over the last two weeks 7/14/2016   Little interest or pleasure in doing things Not at all   Feeling down, depressed or hopeless Not at all   Total Score PHQ 2 0     Alcohol Risk Factor Screening: On any occasion during the past 3 months, have you had more than 3 drinks containing alcohol? No    Do you average more than 7 drinks per week? No        Functional Ability and Level of Safety:   No exam data present    Hearing Loss   none    Activities of Daily Living   Self-care. Requires assistance with: no ADLs    Fall Risk   Fall Risk Assessment, last 12 mths 7/14/2016   Able to walk? Yes   Fall in past 12 months? No     Abuse Screen   Patient is not abused    Review of Systems   See additional note    Physical Examination     Evaluation of Cognitive Function:  Mood/affect:  happy  Appearance: age appropriate  Family member/caregiver input: no    Nursing note and vitals reviewed. Constitutional: She is oriented to person, place, and time. She appears well-developed and well-nourished. No distress. HENT:   Mouth/Throat: Oropharynx is clear and moist.   Neck: No JVD present. No thyromegaly present. Cardiovascular: Normal rate, regular rhythm and normal heart sounds. Pulmonary/Chest: Effort normal and breath sounds normal. No respiratory distress. She has no wheezes. She has no rales. Musculoskeletal: She exhibits no edema. Lymphadenopathy:     She has no cervical adenopathy. Neurological: She is alert and oriented to person, place, and time. Coordination normal.   Psychiatric: She has a normal mood and affect. Her behavior is normal.      Patient Care Team:  Chica Macedo MD as PCP - General (Family Practice)  Collin Banda MD as Physician (Urology)  Cadence Welch DO (Cardiology)    Advice/Referrals/Counseling   Education and counseling provided:  Are appropriate based on today's review and evaluation  End-of-Life planning (with patient's consent)  Pneumococcal Vaccine  Influenza Vaccine  Screening Mammography  Screening Pap and pelvic (covered once every 2 years)  Colorectal cancer screening tests  Bone mass measurement (DEXA)      Assessment/Plan       ICD-10-CM ICD-9-CM    1. Routine general medical examination at a health care facility Z00.00 V70.0    2. Screening for depression Z13.89 V79.0 DEPRESSION SCREEN ANNUAL   3. Screening for alcoholism Z13.89 V79.1    4. Screen for colon cancer Z12.11 V76.51 OCCULT BLOOD, IMMUNOASSAY (FIT)   5. Tubular adenoma of colon, colonscopy 5/2016 D12.6 211.3 CBC WITH AUTOMATED DIFF   6. Advanced directives, counseling/discussion Z71.89 V65.49 ADVANCE CARE PLANNING FIRST 30 MINS   7.  ACP (advance care planning) Z71.89 V65.49 ADVANCE CARE PLANNING FIRST 30 MINS

## 2017-06-28 NOTE — PROGRESS NOTES
HISTORY OF PRESENT ILLNESS  Maggie Akers is a 80 y.o. female. 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 EF 65%. No RWMA. LA size was upper limits of normal (decreased in size since prev study of 9/23/16). Mild MR. Current Outpatient Prescriptions   Medication Sig Dispense Refill           aspirin delayed-release (ADULT LOW DOSE ASPIRIN) 81 mg tablet Take 1 Tab by mouth daily. 30 Tab prn    cholecalciferol (VITAMIN D3) 1,000 unit cap Take 1,000 Units by mouth every other day.  raloxifene (EVISTA) 60 mg tablet 60 mg daily. 0     No Known Allergies    ROS  Visit Vitals    /80 (BP 1 Location: Left arm, BP Patient Position: Sitting)    Pulse 63    Temp 96.8 °F (36 °C) (Oral)    Resp 16    Ht 5' 4.5\" (1.638 m)    Wt 215 lb (97.5 kg)    BMI 36.33 kg/m2       Physical Exam Nursing note and vitals reviewed. Constitutional: She is oriented to person, place, and time. She appears well-developed and well-nourished. No distress. HENT: cyst right preauricular area   Mouth/Throat: Oropharynx is clear and moist.   Neck: No JVD present. No thyromegaly present. Cardiovascular: Normal rate, regular rhythm and normal heart sounds. Pulmonary/Chest: Effort normal and breath sounds normal. No respiratory distress. She has no wheezes. She has no rales. Musculoskeletal: She exhibits no edema. Lymphadenopathy:     She has no cervical adenopathy. Neurological: She is alert and oriented to person, place, and time. Coordination normal.   Psychiatric: She has a normal mood and affect.  Her behavior is normal.    Results for orders placed or performed during the hospital encounter of 09/23/16   CBC WITH AUTOMATED DIFF   Result Value Ref Range    WBC 4.0 (L) 4.6 - 13.2 K/uL    RBC 4.79 4.20 - 5.30 M/uL    HGB 12.9 12.0 - 16.0 g/dL    HCT 39.5 35.0 - 45.0 %    MCV 82.5 74.0 - 97.0 FL    MCH 26.9 24.0 - 34.0 PG    MCHC 32.7 31.0 - 37.0 g/dL    RDW 15.3 (H) 11.6 - 14.5 %    PLATELET 413 405 - 890 K/uL    MPV 11.2 9.2 - 11.8 FL    NEUTROPHILS 57 40 - 73 %    LYMPHOCYTES 34 21 - 52 %    MONOCYTES 7 3 - 10 %    EOSINOPHILS 2 0 - 5 %    BASOPHILS 0 0 - 2 %    ABS. NEUTROPHILS 2.2 1.8 - 8.0 K/UL    ABS. LYMPHOCYTES 1.4 0.9 - 3.6 K/UL    ABS. MONOCYTES 0.3 0.05 - 1.2 K/UL    ABS. EOSINOPHILS 0.1 0.0 - 0.4 K/UL    ABS. BASOPHILS 0.0 0.0 - 0.1 K/UL    DF AUTOMATED     LIPID PANEL   Result Value Ref Range    LIPID PROFILE          Cholesterol, total 177 <200 MG/DL    Triglyceride 68 <150 MG/DL    HDL Cholesterol 77 (H) 40 - 60 MG/DL    LDL, calculated 86.4 0 - 100 MG/DL    VLDL, calculated 13.6 MG/DL    CHOL/HDL Ratio 2.3 0 - 5.0     METABOLIC PANEL, COMPREHENSIVE   Result Value Ref Range    Sodium 142 136 - 145 mmol/L    Potassium 4.0 3.5 - 5.5 mmol/L    Chloride 109 (H) 100 - 108 mmol/L    CO2 26 21 - 32 mmol/L    Anion gap 7 3.0 - 18 mmol/L    Glucose 89 74 - 99 mg/dL    BUN 10 7.0 - 18 MG/DL    Creatinine 0.75 0.6 - 1.3 MG/DL    BUN/Creatinine ratio 13 12 - 20      GFR est AA >60 >60 ml/min/1.73m2    GFR est non-AA >60 >60 ml/min/1.73m2    Calcium 8.4 (L) 8.5 - 10.1 MG/DL    Bilirubin, total 0.5 0.2 - 1.0 MG/DL    ALT (SGPT) 15 13 - 56 U/L    AST (SGOT) 14 (L) 15 - 37 U/L    Alk.  phosphatase 74 45 - 117 U/L    Protein, total 6.9 6.4 - 8.2 g/dL    Albumin 3.5 3.4 - 5.0 g/dL    Globulin 3.4 2.0 - 4.0 g/dL    A-G Ratio 1.0 0.8 - 1.7     TSH, 3RD GENERATION   Result Value Ref Range    TSH 0.87 0.36 - 3.74 uIU/mL         ASSESSMENT and PLAN    ICD-10-CM ICD-9-CM                                                     8. Cough ck cxr R05 786.2 CBC WITH AUTOMATED DIFF   9. Sebaceous cyst, right preauricular L72.3 706.2 cephALEXin (KEFLEX) 500 mg capsule   10. Need for hepatitis C screening test Z11.59 V73.89 HEPATITIS C AB   11. Vitamin D deficiency stable continue current medications  A20.9 714.6 METABOLIC PANEL, COMPREHENSIVE      VITAMIN D, 25 HYDROXY   12. Screening cholesterol level Z13.220 V77.91    13. Elevated blood pressure reading R03.0 796.2 LIPID PANEL   14. Encounter for screening for cardiovascular disorders  Z13.6 V81.2 LIPID PANEL   15.  Medication monitoring encounter P54.36 J58.84 METABOLIC PANEL, COMPREHENSIVE      LIPID PANEL      CBC WITH AUTOMATED DIFF      VITAMIN D, 25 HYDROXY

## 2017-06-28 NOTE — ACP (ADVANCE CARE PLANNING)
Advance Care Planning      Advance Care Planning    Advance Care Planning (ACP) Provider Note - Comprehensive     Date of ACP Conversation: 06/28/2017  Persons included in Conversation:  patient  Length of ACP Conversation in minutes:   20 minutes    Authorized Decision Maker (if patient is incapable of making informed decisions): This person is:            General ACP for ALL Patients with Decision Making Capacity:   Importance of advance care planning, including choosing a healthcare agent to communicate patient's healthcare decisions if patient lost the ability to make decisions, such as after a sudden illness or accident  Understanding of the healthcare agent role was assessed and information provided  Exploration of values, goals, and preferences if recovery is not expected, even with continued medical treatment in the event of: Imminent death  Severe, permanent brain injury  Opportunity offered to explore how cultural, Gnosticist, spiritual, or personal beliefs would affect decisions for future care     Review of Existing Advance Directive:  none    For Serious or Chronic Illness:  No known illness    Interventions Provided:  Recommended completion of Advance Directive form after review of ACP materials and conversation with prospective healthcare agent   Recommended communicating the plan and making copies for the healthcare agent, personal physician, and others as appropriate (e.g., health system)  Recommended review of completed ACP document annually or upon change in health status Discussed in detail [address] with patient. Discussed in detail [address] with patient. Patient is undecided at the present time and handouts given for pt to decide on disposition.

## 2017-10-31 ENCOUNTER — HOSPITAL ENCOUNTER (OUTPATIENT)
Dept: LAB | Age: 72
Discharge: HOME | End: 2017-10-31
Payer: MEDICARE

## 2017-10-31 ENCOUNTER — OFFICE VISIT (OUTPATIENT)
Dept: FAMILY MEDICINE CLINIC | Age: 72
End: 2017-10-31

## 2017-10-31 VITALS
WEIGHT: 215.75 LBS | DIASTOLIC BLOOD PRESSURE: 80 MMHG | TEMPERATURE: 97.3 F | HEIGHT: 65 IN | RESPIRATION RATE: 20 BRPM | HEART RATE: 64 BPM | BODY MASS INDEX: 35.94 KG/M2 | SYSTOLIC BLOOD PRESSURE: 133 MMHG

## 2017-10-31 DIAGNOSIS — R05.9 COUGH: ICD-10-CM

## 2017-10-31 DIAGNOSIS — E78.5 DYSLIPIDEMIA: ICD-10-CM

## 2017-10-31 DIAGNOSIS — R03.0 ELEVATED BLOOD PRESSURE READING: ICD-10-CM

## 2017-10-31 DIAGNOSIS — M79.605 PAIN OF LEFT LOWER EXTREMITY: Primary | ICD-10-CM

## 2017-10-31 DIAGNOSIS — M79.609 PARESTHESIA AND PAIN OF LEFT EXTREMITY: ICD-10-CM

## 2017-10-31 DIAGNOSIS — I10 BENIGN HYPERTENSION: ICD-10-CM

## 2017-10-31 DIAGNOSIS — Z13.6 ENCOUNTER FOR SCREENING FOR CARDIOVASCULAR DISORDERS: ICD-10-CM

## 2017-10-31 DIAGNOSIS — D12.6 TUBULAR ADENOMA OF COLON: ICD-10-CM

## 2017-10-31 DIAGNOSIS — R20.2 PARESTHESIA AND PAIN OF LEFT EXTREMITY: ICD-10-CM

## 2017-10-31 DIAGNOSIS — M79.605 PAIN OF LEFT LOWER EXTREMITY: ICD-10-CM

## 2017-10-31 DIAGNOSIS — E55.9 VITAMIN D DEFICIENCY: ICD-10-CM

## 2017-10-31 DIAGNOSIS — Z11.59 NEED FOR HEPATITIS C SCREENING TEST: ICD-10-CM

## 2017-10-31 DIAGNOSIS — Z51.81 MEDICATION MONITORING ENCOUNTER: ICD-10-CM

## 2017-10-31 LAB
ALBUMIN SERPL-MCNC: 3.3 G/DL (ref 3.4–5)
ALBUMIN/GLOB SERPL: 0.9 {RATIO} (ref 0.8–1.7)
ALP SERPL-CCNC: 84 U/L (ref 45–117)
ALT SERPL-CCNC: 14 U/L (ref 13–56)
ANION GAP SERPL CALC-SCNC: 6 MMOL/L (ref 3–18)
AST SERPL-CCNC: 16 U/L (ref 15–37)
BASOPHILS # BLD: 0 K/UL (ref 0–0.06)
BASOPHILS NFR BLD: 1 % (ref 0–2)
BILIRUB SERPL-MCNC: 0.3 MG/DL (ref 0.2–1)
BUN SERPL-MCNC: 15 MG/DL (ref 7–18)
BUN/CREAT SERPL: 18 (ref 12–20)
CALCIUM SERPL-MCNC: 8.5 MG/DL (ref 8.5–10.1)
CHLORIDE SERPL-SCNC: 108 MMOL/L (ref 100–108)
CHOLEST SERPL-MCNC: 173 MG/DL
CO2 SERPL-SCNC: 29 MMOL/L (ref 21–32)
CREAT SERPL-MCNC: 0.83 MG/DL (ref 0.6–1.3)
DIFFERENTIAL METHOD BLD: ABNORMAL
EOSINOPHIL # BLD: 0.1 K/UL (ref 0–0.4)
EOSINOPHIL NFR BLD: 3 % (ref 0–5)
ERYTHROCYTE [DISTWIDTH] IN BLOOD BY AUTOMATED COUNT: 15.2 % (ref 11.6–14.5)
GLOBULIN SER CALC-MCNC: 3.7 G/DL (ref 2–4)
GLUCOSE SERPL-MCNC: 91 MG/DL (ref 74–99)
HCT VFR BLD AUTO: 41.4 % (ref 35–45)
HDLC SERPL-MCNC: 84 MG/DL (ref 40–60)
HDLC SERPL: 2.1 {RATIO} (ref 0–5)
HGB BLD-MCNC: 13.2 G/DL (ref 12–16)
LDLC SERPL CALC-MCNC: 74.8 MG/DL (ref 0–100)
LIPID PROFILE,FLP: ABNORMAL
LYMPHOCYTES # BLD: 1.2 K/UL (ref 0.9–3.6)
LYMPHOCYTES NFR BLD: 29 % (ref 21–52)
MAGNESIUM SERPL-MCNC: 2.2 MG/DL (ref 1.6–2.6)
MCH RBC QN AUTO: 26.5 PG (ref 24–34)
MCHC RBC AUTO-ENTMCNC: 31.9 G/DL (ref 31–37)
MCV RBC AUTO: 83.1 FL (ref 74–97)
MONOCYTES # BLD: 0.3 K/UL (ref 0.05–1.2)
MONOCYTES NFR BLD: 6 % (ref 3–10)
NEUTS SEG # BLD: 2.5 K/UL (ref 1.8–8)
NEUTS SEG NFR BLD: 61 % (ref 40–73)
PLATELET # BLD AUTO: 249 K/UL (ref 135–420)
PMV BLD AUTO: 11.2 FL (ref 9.2–11.8)
POTASSIUM SERPL-SCNC: 4.2 MMOL/L (ref 3.5–5.5)
PROT SERPL-MCNC: 7 G/DL (ref 6.4–8.2)
RBC # BLD AUTO: 4.98 M/UL (ref 4.2–5.3)
SODIUM SERPL-SCNC: 143 MMOL/L (ref 136–145)
TRIGL SERPL-MCNC: 71 MG/DL (ref ?–150)
VIT B12 SERPL-MCNC: 460 PG/ML (ref 211–911)
VLDLC SERPL CALC-MCNC: 14.2 MG/DL
WBC # BLD AUTO: 4.1 K/UL (ref 4.6–13.2)

## 2017-10-31 PROCEDURE — 86803 HEPATITIS C AB TEST: CPT | Performed by: FAMILY MEDICINE

## 2017-10-31 PROCEDURE — 83735 ASSAY OF MAGNESIUM: CPT | Performed by: FAMILY MEDICINE

## 2017-10-31 PROCEDURE — 36415 COLL VENOUS BLD VENIPUNCTURE: CPT | Performed by: FAMILY MEDICINE

## 2017-10-31 PROCEDURE — 80053 COMPREHEN METABOLIC PANEL: CPT | Performed by: FAMILY MEDICINE

## 2017-10-31 PROCEDURE — 82607 VITAMIN B-12: CPT | Performed by: FAMILY MEDICINE

## 2017-10-31 PROCEDURE — 80061 LIPID PANEL: CPT | Performed by: FAMILY MEDICINE

## 2017-10-31 PROCEDURE — 82274 ASSAY TEST FOR BLOOD FECAL: CPT | Performed by: FAMILY MEDICINE

## 2017-10-31 PROCEDURE — 82306 VITAMIN D 25 HYDROXY: CPT | Performed by: FAMILY MEDICINE

## 2017-10-31 PROCEDURE — 85025 COMPLETE CBC W/AUTO DIFF WBC: CPT | Performed by: FAMILY MEDICINE

## 2017-10-31 RX ORDER — METHOCARBAMOL 500 MG/1
500 TABLET, FILM COATED ORAL
Qty: 20 TAB | Refills: 0 | Status: SHIPPED | OUTPATIENT
Start: 2017-10-31 | End: 2021-05-18 | Stop reason: ALTCHOICE

## 2017-10-31 NOTE — PROGRESS NOTES
HISTORY OF PRESENT ILLNESS  Luca Feliz is a 71 y.o. female. Chief Complaint   Patient presents with    Vitamin D Deficiency    Elevated Blood Pressure chronic problem, stable     Osteoarthritis    Cholesterol Problem chronic problem, stable      high chol     complains of cramping in left leg thigh and leg off and on for 2 months, episodic, worse at night, but perhaps once a week. No associated back pain  HPI  Past Medical History:   Diagnosis Date    Breast mass     right,  excisional biopsy 1/15,  atypical ductal hyperplasia (PATH 1/15)    Cardiac echocardiogram 12/02/2016    Ltd study to evaluate LA size. EF 65%. No RWMA. LA size was upper limits of normal (decreased in size since prev study of 9/23/16). Mild MR. Current Outpatient Prescriptions   Medication Sig Dispense Refill    aspirin delayed-release (ADULT LOW DOSE ASPIRIN) 81 mg tablet Take 1 Tab by mouth daily. 30 Tab prn    cholecalciferol (VITAMIN D3) 1,000 unit cap Take 1,000 Units by mouth every other day.  raloxifene (EVISTA) 60 mg tablet 60 mg daily. 0     No Known Allergies    Review of Systems   Constitutional: Negative for chills and fever. Respiratory: Negative for shortness of breath. Cardiovascular: Negative for chest pain. Musculoskeletal: Positive for joint pain. Visit Vitals    /80 (BP 1 Location: Left arm, BP Patient Position: Sitting)    Pulse 64    Temp 97.3 °F (36.3 °C) (Oral)    Resp 20    Ht 5' 4.5\" (1.638 m)    Wt 215 lb 12 oz (97.9 kg)    BMI 36.46 kg/m2       Physical Exam  Nursing note and vitals reviewed. Constitutional: She is oriented to person, place, and time. She appears well-developed and well-nourished. No distress. HENT:   Mouth/Throat: Oropharynx is clear and moist.   Neck: No JVD present. No thyromegaly present. Cardiovascular: Normal rate, regular rhythm and normal heart sounds. Pulmonary/Chest: Effort normal and breath sounds normal. No respiratory distress. She has no wheezes. She has no rales. Musculoskeletal: She exhibits no edema. Lymphadenopathy:     She has no cervical adenopathy. Neurological: She is alert and oriented to person, place, and time. Coordination normal.   Psychiatric: She has a normal mood and affect. Her behavior is normal.    ASSESSMENT and PLAN    ICD-10-CM ICD-9-CM    1. Pain of left lower extremity M79.605 729.5 MAGNESIUM      VITAMIN B12   2. Paresthesia and pain of left extremity M79.609 729.5 MAGNESIUM    R20.2 782.0 VITAMIN B12      methocarbamol (ROBAXIN) 500 mg tablet   3. Hypertension I10 401.1    4. Dyslipidemia E78.5 272.4    follow up if symptoms persist or worsen. Follow-up Disposition:  Return in about 5 weeks (around 12/5/2017).

## 2017-10-31 NOTE — MR AVS SNAPSHOT
Visit Information Date & Time Provider Department Dept. Phone Encounter #  
 10/31/2017 10:00 AM Sarah Mckeon MD [address] [phone] [id number] Follow-up Instructions Return in about 5 weeks (around 12/5/2017). Upcoming Health Maintenance Date Due Hepatitis C Screening 1945 DTaP/Tdap/Td series (1 - Tdap) 10/24/1967 Pneumococcal 65+ Low/Medium Risk (2 of 2 - PCV13) 3/3/2017 GLAUCOMA SCREENING Q2Y 1/4/2018 MEDICARE YEARLY EXAM 6/29/2018 BREAST CANCER SCRN MAMMOGRAM 6/28/2019 COLONOSCOPY 5/25/2021 Allergies as of 10/31/2017  Review Complete On: 10/31/2017 By: Sarah Mckeon MD  
 No Known Allergies Current Immunizations  Reviewed on 6/28/2017 Name Date Pneumococcal Polysaccharide (PPSV-23) 3/3/2016 Not reviewed this visit You Were Diagnosed With   
  
 Codes Comments Pain of left lower extremity    -  Primary ICD-10-CM: M79.605 ICD-9-CM: 729.5 Paresthesia and pain of left extremity     ICD-10-CM: M79.609, R20.2 ICD-9-CM: 729.5, 782.0 Benign hypertension     ICD-10-CM: I10 
ICD-9-CM: 401.1 Dyslipidemia     ICD-10-CM: E78.5 ICD-9-CM: 272.4 Vitals BP Pulse Temp Resp Height(growth percentile) Weight(growth percentile) 133/80 (BP 1 Location: Left arm, BP Patient Position: Sitting) 64 97.3 °F (36.3 °C) (Oral) 20 5' 4.5\" (1.638 m) 215 lb 12 oz (97.9 kg) BMI OB Status Smoking Status 36.46 kg/m2 Hysterectomy Former Smoker BMI and BSA Data Body Mass Index Body Surface Area  
 36.46 kg/m 2 2.11 m 2 Preferred Pharmacy Pharmacy Name Phone [address], [address] [place] [address] [phone] Your Updated Medication List  
  
   
This list is accurate as of: 10/31/17 11:06 AM.  Always use your most recent med list.  
  
  
  
  
 ADULT LOW DOSE ASPIRIN 81 mg tablet Generic drug:  aspirin delayed-release Take 1 Tab by mouth daily. cholecalciferol 1,000 unit Cap Commonly known as:  VITAMIN D3 Take 1,000 Units by mouth every other day. raloxifene 60 mg tablet Commonly known as:  EVISTA 60 mg daily. Follow-up Instructions Return in about 5 weeks (around 12/5/2017). To-Do List   
 10/31/2017 Lab:  MAGNESIUM   
  
 10/31/2017 Lab:  VITAMIN B12 Patient Instructions Please contact our office if you have any questions about your visit today. Introducing [facility]! [employer] introduces [employer] patient portal. Now you can access parts of your medical record, email your doctor's office, and request medication refills online. 1. In your internet browser, go to [email or web address] 2. Click on the First Time User? Click Here link in the Sign In box. You will see the New Member Sign Up page. 3. Enter your e[employer]ark Access Code exactly as it appears below. You will not need to use this code after youve completed the sign-up process. If you do not sign up before the expiration date, you must request a new code. · e[employer]ark Access Code: [email or web address] Expires: 1/29/2018 11:06 AM 
 
4. Enter the last four digits of your Social Security Number (xxxx) and Date of Birth (mm/dd/yyyy) as indicated and click Submit. You will be taken to the next sign-up page. 5. Create a eSpark ID. This will be your eSpark login ID and cannot be changed, so think of one that is secure and easy to remember. 6. Create a eSpark password. You can change your password at any time. 7. Enter your Password Reset Question and Answer. This can be used at a later time if you forget your password. 8. Enter your e-mail address. You will receive e-mail notification when new information is available in [address] 9. Click Sign Up. You can now view and download portions of your medical record. 10. Click the Download Summary menu link to download a portable copy of your medical information. If you have questions, please visit the Frequently Asked Questions section of the [employer] website. Remember, [employer] is NOT to be used for urgent needs. For medical emergencies, dial 911. Now available from your iPhone and Android! Please provide this summary of care documentation to your next provider. Your primary care clinician is listed as RONA MIRANDA. If you have any questions after today's visit, please call [phone].

## 2017-10-31 NOTE — PROGRESS NOTES
Chief Complaint   Patient presents with    Vitamin D Deficiency    Elevated Blood Pressure    Osteoarthritis    Cholesterol Problem     high chol       Health Maintenance Due   Topic Date Due    Hepatitis C Screening  1945    DTaP/Tdap/Td series (1 - Tdap) 10/24/1967    Pneumococcal 65+ Low/Medium Risk (2 of 2 - PCV13) 03/03/2017    INFLUENZA AGE 9 TO ADULT  08/01/2017       Health Maintenance reviewed     1. Have you been to the ER, urgent care clinic since your last visit? Hospitalized since your last visit? No    2. Have you seen or consulted any other health care providers outside of the [address] [place] [address] since your last visit? Include any pap smears or colon screening.  No

## 2017-11-01 LAB
25(OH)D3 SERPL-MCNC: 27.8 NG/ML (ref 30–100)
HCV AB SER IA-ACNC: 0.05 INDEX
HCV AB SERPL QL IA: NEGATIVE
HCV COMMENT,HCGAC: NORMAL
HEMOCCULT STL QL IA: POSITIVE

## 2017-11-15 ENCOUNTER — HOSPITAL ENCOUNTER (EMERGENCY)
Age: 72
Discharge: HOME | End: 2017-11-15
Attending: EMERGENCY MEDICINE
Payer: MEDICARE

## 2017-11-15 VITALS
SYSTOLIC BLOOD PRESSURE: 144 MMHG | TEMPERATURE: 98.1 F | RESPIRATION RATE: 16 BRPM | HEART RATE: 82 BPM | OXYGEN SATURATION: 97 % | DIASTOLIC BLOOD PRESSURE: 96 MMHG

## 2017-11-15 DIAGNOSIS — S61.412A LACERATION OF LEFT HAND WITHOUT FOREIGN BODY, INITIAL ENCOUNTER: Primary | ICD-10-CM

## 2017-11-15 PROCEDURE — 77030031132 HC SUT NYL COVD -A

## 2017-11-15 PROCEDURE — 74011250636 HC RX REV CODE- 250/636: Performed by: PHYSICIAN ASSISTANT

## 2017-11-15 PROCEDURE — 75810000293 HC SIMP/SUPERF WND  RPR

## 2017-11-15 PROCEDURE — 90471 IMMUNIZATION ADMIN: CPT

## 2017-11-15 PROCEDURE — 77030018836 HC SOL IRR NACL ICUM -A

## 2017-11-15 PROCEDURE — 90715 TDAP VACCINE 7 YRS/> IM: CPT | Performed by: PHYSICIAN ASSISTANT

## 2017-11-15 PROCEDURE — 99282 EMERGENCY DEPT VISIT SF MDM: CPT

## 2017-11-15 RX ADMIN — TETANUS TOXOID, REDUCED DIPHTHERIA TOXOID AND ACELLULAR PERTUSSIS VACCINE, ADSORBED 0.5 ML: 5; 2.5; 8; 8; 2.5 SUSPENSION INTRAMUSCULAR at 12:53

## 2017-11-15 NOTE — ED PROVIDER NOTES
HPI Comments: 66yoF to ED c/o left hand laceration. Accidentally cut hand with knife as she was attempting to remove paper from frozen steak. Bleeding controlled. No numbness or tingling. Right hand dominant. Tetanus status unknown. Patient is a 71 y.o. female presenting with skin laceration. The history is provided by the patient. Laceration    The incident occurred less than 1 hour ago. The laceration is located on the left hand. The laceration is 1 cm in size. The injury mechanism is a clean knife. Foreign body present: no. The pain is mild. The pain has been constant since onset. Pertinent negatives include no numbness, no tingling, no weakness and no loss of motion. It is unknown when the patient last had a tetanus shot. Past Medical History:   Diagnosis Date    Breast mass     right,  excisional biopsy 1/15,  atypical ductal hyperplasia (PATH 1/15)    Cardiac echocardiogram 12/02/2016    Ltd study to evaluate LA size. EF 65%. No RWMA. LA size was upper limits of normal (decreased in size since prev study of 9/23/16). Mild MR. Past Surgical History:   Procedure Laterality Date    COLONOSCOPY N/A 5/19/2016    COLONOSCOPY with polypectomy performed by River Biggs MD at [facility] ENDOSCOPY    HX HYSTERECTOMY           Family History:   Problem Relation Age of Onset    Hypertension Mother     Prostate Cancer Father        Social History     Social History    Marital status:      Spouse name: N/A    Number of children: N/A    Years of education: N/A     Occupational History    Not on file.      Social History Main Topics    Smoking status: Former Smoker     Years: 13.00    Smokeless tobacco: Never Used      Comment: occasional, stopped at age 37    Alcohol use No    Drug use: No    Sexual activity: Yes     Partners: Male     Other Topics Concern    Not on file     Social History Narrative         ALLERGIES: Review of patient's allergies indicates no known allergies. Review of Systems   Skin: Positive for wound. Neurological: Negative for tingling, weakness and numbness. All other systems reviewed and are negative. Vitals:    11/15/17 1148   BP: (!) 144/96   Pulse: 82   Resp: 16   Temp: 98.1 °F (36.7 °C)   SpO2: 97%            Physical Exam   Constitutional: She appears well-developed and well-nourished. No distress. Musculoskeletal:        Left hand: She exhibits laceration. She exhibits normal range of motion, no tenderness, normal capillary refill and no swelling. Normal sensation noted. Normal strength noted. Hands:  Skin: She is not diaphoretic. MDM  Number of Diagnoses or Management Options  Laceration of left hand without foreign body, initial encounter:   Diagnosis management comments: Pt with small laceration over left 2nd MCP joint w/o tendon or ligament involvement; FROM and N/V intact. Repaired w/o difficulty. Wound care discussed w/ pt. NAVEED Gomez 12:52 PM      Risk of Complications, Morbidity, and/or Mortality  Presenting problems: low  Diagnostic procedures: minimal  Management options: minimal    Patient Progress  Patient progress: improved    ED Course       Wound Repair  Date/Time: 11/15/2017 12:52 PM  Performed by: [address] [place] [address] provider: Mark Marroquin MD  Preparation: skin prepped with Shur-Clens  Pre-procedure re-eval: Immediately prior to the procedure, the patient was reevaluated and found suitable for the planned procedure and any planned medications.   Location details: left hand  Wound length:2.5 cm or less  Anesthesia: local infiltration    Anesthesia:  Local Anesthetic: lidocaine 1% without epinephrine  Foreign bodies: no foreign bodies  Irrigation solution: saline  Irrigation method: syringe  Debridement: none  Skin closure: 5-0 nylon  Number of sutures: 2  Technique: simple and interrupted  Approximation: close  Dressing: antibiotic ointment and 4x4  Patient tolerance: Patient tolerated the procedure well with no immediate complications  My total time at bedside, performing this procedure was 1-15 minutes.

## 2017-11-15 NOTE — DISCHARGE INSTRUCTIONS
Cuts on the Hand Closed With Stitches: Care Instructions  Your Care Instructions    A cut on your hand can be on your fingers, your thumb, or the front or back of your hand. Sometimes a cut can injure the tendons, blood vessels, or nerves of your hand. The doctor used stitches to close the cut. Using stitches also helps the cut heal and reduces scarring. The doctor may have given you a splint to help prevent you from moving your hand, fingers, or thumb. If the cut went deep and through the skin, the doctor put in two layers of stitches. The deeper layer brings the deep part of the cut together. These stitches will dissolve and don't need to be removed. The stitches in the upper layer are the ones you see on the cut. You will probably have a bandage. You will need to have the stitches removed, usually in 7 to 14 days. The doctor may suggest that you see a hand specialist if the cut is very deep or if you have trouble moving your fingers or have less feeling in your hand. The doctor has checked you carefully, but problems can develop later. If you notice any problems or new symptoms, get medical treatment right away. Follow-up care is a key part of your treatment and safety. Be sure to make and go to all appointments, and call your doctor if you are having problems. It's also a good idea to know your test results and keep a list of the medicines you take. How can you care for yourself at home? · Keep the cut dry for the first 24 to 48 hours. After this, you can shower if your doctor okays it. Pat the cut dry. · Don't soak the cut, such as in a bathtub. Your doctor will tell you when it's safe to get the cut wet. · If your doctor told you how to care for your cut, follow your doctor's instructions. If you did not get instructions, follow this general advice:  ¨ After the first 24 to 48 hours, wash around the cut with clean water 2 times a day.  Don't use hydrogen peroxide or alcohol, which can slow healing. ¨ You may cover the cut with a thin layer of petroleum jelly, such as Vaseline, and a nonstick bandage. ¨ Apply more petroleum jelly and replace the bandage as needed. · Prop up the sore hand on a pillow anytime you sit or lie down during the next 3 days. Try to keep it above the level of your heart. This will help reduce swelling. · Avoid any activity that could cause your cut to reopen. · Do not remove the stitches on your own. Your doctor will tell you when to come back to have the stitches removed. · Be safe with medicines. Take pain medicines exactly as directed. ¨ If the doctor gave you a prescription medicine for pain, take it as prescribed. ¨ If you are not taking a prescription pain medicine, ask your doctor if you can take an over-the-counter medicine. When should you call for help? Call your doctor now or seek immediate medical care if:  ? · You have new pain, or your pain gets worse. ? · The skin near the cut is cold or pale or changes color. ? · You have tingling, weakness, or numbness near the cut.   ? · The cut starts to bleed, and blood soaks through the bandage. Oozing small amounts of blood is normal.   ? · You have trouble moving the area of the hand near the cut.   ? · You have symptoms of infection, such as:  ¨ Increased pain, swelling, warmth, or redness around the cut. ¨ Red streaks leading from the cut. ¨ Pus draining from the cut. ¨ A fever. ? Watch closely for changes in your health, and be sure to contact your doctor if:  ? · You do not get better as expected. Where can you learn more? Go to http://[place]-johnson.info/. Enter T250 in the search box to learn more about \"Cuts on the Hand Closed With Stitches: Care Instructions. \"  Current as of: March 20, 2017  Content Version: 11.4  © 2128-0432 [employer].  Care instructions adapted under license by [employer] (which disclaims liability or warranty for this information). If you have questions about a medical condition or this instruction, always ask your healthcare professional. [place] [address] any warranty or liability for your use of this information.

## 2017-11-27 ENCOUNTER — HOSPITAL ENCOUNTER (EMERGENCY)
Age: 72
Discharge: HOME | End: 2017-11-27
Attending: EMERGENCY MEDICINE
Payer: MEDICARE

## 2017-11-27 VITALS
TEMPERATURE: 98.1 F | DIASTOLIC BLOOD PRESSURE: 82 MMHG | HEART RATE: 69 BPM | RESPIRATION RATE: 16 BRPM | OXYGEN SATURATION: 98 % | SYSTOLIC BLOOD PRESSURE: 126 MMHG

## 2017-11-27 DIAGNOSIS — Z48.02 VISIT FOR SUTURE REMOVAL: Primary | ICD-10-CM

## 2017-11-27 PROCEDURE — 75810000275 HC EMERGENCY DEPT VISIT NO LEVEL OF CARE

## 2017-11-27 NOTE — ED PROVIDER NOTES
EMERGENCY DEPARTMENT HISTORY AND PHYSICAL EXAM    4:05 PM      Date: 11/27/2017  Patient Name: Danelle Hernandez    History of Presenting Illness     Chief Complaint   Patient presents with    Suture Removal         History Provided By: Patient    Chief Complaint: suture removal  Duration: 1.5 Weeks  Timing:  N/A  Location: left hand  Quality: none  Severity: none  Modifying Factors: none  Associated Symptoms: denies any other associated signs or symptoms      Additional History (Context): Danelle Hernandez is a 71 y.o. female with No significant past medical history who presents with need for a suture removal from left hand. The patient states she was cooking on 11/15/17 when she cut her left hand and required stitches upon being seen here at [facility] on 11/15/17. The patient denies pain and fever. PCP: Conchita Kirkpatrick MD    Current Outpatient Prescriptions   Medication Sig Dispense Refill    methocarbamol (ROBAXIN) 500 mg tablet Take 1 Tab by mouth nightly as needed. 20 Tab 0    aspirin delayed-release (ADULT LOW DOSE ASPIRIN) 81 mg tablet Take 1 Tab by mouth daily. 30 Tab prn    cholecalciferol (VITAMIN D3) 1,000 unit cap Take 1,000 Units by mouth every other day.  raloxifene (EVISTA) 60 mg tablet 60 mg daily. 0       Past History     Past Medical History:  Past Medical History:   Diagnosis Date    Breast mass     right,  excisional biopsy 1/15,  atypical ductal hyperplasia ([facility] 1/15)    Cardiac echocardiogram 12/02/2016    Ltd study to evaluate LA size. EF 65%. No RWMA. LA size was upper limits of normal (decreased in size since prev study of 9/23/16). Mild MR.          Past Surgical History:  Past Surgical History:   Procedure Laterality Date    COLONOSCOPY N/A 5/19/2016    COLONOSCOPY with polypectomy [facility] by [facility] at [facility]    HX HYSTERECTOMY         Family History:  Family History   Problem Relation Age of Onset    Hypertension Mother     Prostate Cancer Father Social History:  Social History   Substance Use Topics    Smoking status: Former Smoker     Years: 13.00    Smokeless tobacco: Never Used      Comment: occasional, stopped at age 37    Alcohol use No       Allergies:  No Known Allergies      Review of Systems       Review of Systems   Constitutional: Negative for fever. HENT: Negative for sore throat. Eyes: Negative for redness. Respiratory: Negative for shortness of breath and wheezing. Gastrointestinal: Negative for abdominal pain and nausea. Genitourinary: Negative for dysuria. Musculoskeletal: Negative for neck stiffness. Negative for hand pain  Positive for sutures to left hand   Skin: Negative for pallor. Neurological: Negative for headaches. Hematological: Does not bruise/bleed easily. All other systems reviewed and are negative. Physical Exam     Visit Vitals    /82 (BP 1 Location: Left arm, BP Patient Position: At rest)    Pulse 69    Temp 98.1 °F (36.7 °C)    Resp 16    SpO2 98%         Physical Exam   Constitutional: She is oriented to person, place, and time. She appears well-developed and well-nourished. No distress. HENT:   Head: Normocephalic and atraumatic. Eyes: Conjunctivae are normal. Pupils are equal, round, and reactive to light. No scleral icterus. Cardiovascular: Intact distal pulses. Capillary refill < 3 seconds   Pulmonary/Chest: Effort normal. No respiratory distress. Musculoskeletal: Normal range of motion. She exhibits no edema or tenderness. Neurological: She is alert and oriented to person, place, and time. No cranial nerve deficit. She exhibits normal muscle tone. Coordination normal.   Hand Strength 5/5  Sensation intact  Median, ulnar, radial nerves intact   Skin: Skin is warm and dry.  She is not diaphoretic.   2 stitches in left hand below index finger joint  No signs of infection  Is nontender  Sutures are ready to be removed   Psychiatric: Her behavior is normal.   Nursing note and vitals reviewed. Diagnostic Study Results     Labs -  No results found for this or any previous visit (from the past 12 hour(s)). Radiologic Studies -   No orders to display         Medical Decision Making   Provider Notes (Medical Decision Making):   MDM  Number of Diagnoses or Management Options  Visit for suture removal:   Diagnosis management comments: 71 y.o. Female with 2 stitches from 11/15/17 on left hand laceration. The laceration looks well healed. I have removed the stitches which the patient tolerated well. I am the first provider for this patient. I reviewed the vital signs, available nursing notes, past medical history, past surgical history, family history and social history. Vital Signs-Reviewed the patient's vital signs. Records Reviewed: Nursing Notes (Time of Review: 4:05 PM)    ED Course: Progress Notes, Reevaluation, and Consults:  4:10 PM The patient tolerated suture removal well. I have discussed with the patient the plan for discharge after suture removal. The patient understands and agrees with the plan for discharge. Diagnosis     Clinical Impression:   1. Visit for suture removal        Disposition: Discharge    Follow-up Information     Follow up With Details Comments Contact Info    Vishal Alexander MD Schedule an appointment as soon as possible for a visit in 1 week  [address] [facility] EMERGENCY DEPT  As needed, If symptoms worsen [address] [place] [id number]  [phone]           Discharge Medication List as of 11/27/2017  4:09 PM      CONTINUE these medications which have NOT CHANGED    Details   methocarbamol (ROBAXIN) 500 mg tablet Take 1 Tab by mouth nightly as needed., Normal, Disp-20 Tab, R-0      aspirin delayed-release (ADULT LOW DOSE ASPIRIN) 81 mg tablet Take 1 Tab by mouth daily. , OTC, Disp-30 Tab, R-prn      cholecalciferol (VITAMIN D3) 1,000 unit cap Take 1,000 Units by mouth every other day., Historical Med      raloxifene (EVISTA) 60 mg tablet 60 mg daily. , Historical Med, R-0           _______________________________    Attestations:  Scribe Attestation     Maggie Michaelle Closs acting as a scribe for and in the presence of Jasmine File, DO      November 27, 2017 at 4:05 PM       Provider Attestation:      I personally performed the services described in the documentation, reviewed the documentation, as recorded by the scribe in my presence, and it accurately and completely records my words and actions.  November 27, 2017 at 4:05 PM - Darrall File, DO    _______________________________

## 2017-11-27 NOTE — DISCHARGE INSTRUCTIONS
Learning About Stitches and Staples Removal  When are stitches and staples removed? Your doctor will tell you when to have your stitches or staples removed, usually in 7 to 14 days. How long you'll be told to wait will depend on things like where the wound is located, how big and how deep the wound is, and what your general health is like. Do not remove the stitches on your own. Stitches on the face are usually removed within a week. But stitches and staples on other areas of the body, such as on the back or belly or over a joint, may need to stay in place longer, often a week or two. Be sure to follow your doctor's instructions. How are stitches and staples removed? It usually doesn't hurt when the doctor removes the stitches or staples. You may feel a tug as each stitch or staple is removed. · You will either be seated or lying down. · To remove stitches, the doctor will use scissors to cut each of the knots and then pull the threads out. · To remove staples, the doctor will use a tool to take out the staples one at a time. · The area may still feel tender after the stitches or staples are gone. But it should feel better within a few minutes or up to a few hours. What can you expect after stitches and staples are removed? Depending on the type and location of the cut, you will have a scar. Scars usually fade over time. Keep the area clean, but you won't need a bandage. When should you call for help? Call your doctor now or seek immediate medical care if :  · You have new pain, or your pain gets worse. · You have trouble moving the area near the scar. · You have symptoms of infection, such as:  ¨ Increased pain, swelling, warmth, or redness around the scar. ¨ Red streaks leading from the scar. ¨ Pus draining from the scar. ¨ A fever. Watch closely for changes in your health, and be sure to contact your doctor if:  · The scar opens. · You do not get better as expected.   Follow-up care is a key part of your treatment and safety. Be sure to make and go to all appointments, and call your doctor if you do not get better as expected. It's also a good idea to keep a list of the medicines you take. Where can you learn more? Go to http://[place]-johnson.info/. Enter Z320 in the search box to learn more about \"Learning About Stitches and Staples Removal.\"  Current as of: March 20, 2017  Content Version: 11.4  © 0209-2552 Healthwise[employer] Incorporated. Care instructions adapted under license by [employer] (which disclaims liability or warranty for this information). If you have questions about a medical condition or this instruction, always ask your healthcare professional. [address] any warranty or liability for your use of this information.

## 2018-02-12 ENCOUNTER — ANESTHESIA EVENT (OUTPATIENT)
Dept: ENDOSCOPY | Age: 73
End: 2018-02-12
Payer: MEDICARE

## 2018-02-13 ENCOUNTER — ANESTHESIA (OUTPATIENT)
Dept: ENDOSCOPY | Age: 73
End: 2018-02-13
Payer: MEDICARE

## 2018-02-13 ENCOUNTER — HOSPITAL ENCOUNTER (OUTPATIENT)
Age: 73
Setting detail: OUTPATIENT SURGERY
Discharge: HOME | End: 2018-02-13
Attending: INTERNAL MEDICINE | Admitting: INTERNAL MEDICINE
Payer: MEDICARE

## 2018-02-13 VITALS
DIASTOLIC BLOOD PRESSURE: 83 MMHG | SYSTOLIC BLOOD PRESSURE: 131 MMHG | RESPIRATION RATE: 14 BRPM | BODY MASS INDEX: 35.49 KG/M2 | TEMPERATURE: 97.6 F | HEIGHT: 65 IN | OXYGEN SATURATION: 98 % | WEIGHT: 213 LBS | HEART RATE: 60 BPM

## 2018-02-13 PROCEDURE — 77030011640 HC PAD GRND REM COVD -A: Performed by: INTERNAL MEDICINE

## 2018-02-13 PROCEDURE — 88305 TISSUE EXAM BY PATHOLOGIST: CPT | Performed by: INTERNAL MEDICINE

## 2018-02-13 PROCEDURE — 76040000019: Performed by: INTERNAL MEDICINE

## 2018-02-13 PROCEDURE — 74011250636 HC RX REV CODE- 250/636: Performed by: NURSE ANESTHETIST, CERTIFIED REGISTERED

## 2018-02-13 PROCEDURE — 74011250636 HC RX REV CODE- 250/636

## 2018-02-13 PROCEDURE — 74011000250 HC RX REV CODE- 250: Performed by: NURSE ANESTHETIST, CERTIFIED REGISTERED

## 2018-02-13 PROCEDURE — 77030013992 HC SNR POLYP ENDOSC BSC -B: Performed by: INTERNAL MEDICINE

## 2018-02-13 PROCEDURE — 76060000031 HC ANESTHESIA FIRST 0.5 HR: Performed by: INTERNAL MEDICINE

## 2018-02-13 PROCEDURE — 74011000250 HC RX REV CODE- 250

## 2018-02-13 RX ORDER — LIDOCAINE HYDROCHLORIDE 10 MG/ML
0.1 INJECTION, SOLUTION EPIDURAL; INFILTRATION; INTRACAUDAL; PERINEURAL AS NEEDED
Status: DISCONTINUED | OUTPATIENT
Start: 2018-02-13 | End: 2018-02-13 | Stop reason: HOSPADM

## 2018-02-13 RX ORDER — FLUMAZENIL 0.1 MG/ML
0.2 INJECTION INTRAVENOUS
Status: CANCELLED | OUTPATIENT
Start: 2018-02-13 | End: 2018-02-13

## 2018-02-13 RX ORDER — EPINEPHRINE 0.1 MG/ML
1 INJECTION INTRACARDIAC; INTRAVENOUS
Status: CANCELLED | OUTPATIENT
Start: 2018-02-13 | End: 2018-02-13

## 2018-02-13 RX ORDER — LIDOCAINE HYDROCHLORIDE 20 MG/ML
INJECTION, SOLUTION EPIDURAL; INFILTRATION; INTRACAUDAL; PERINEURAL AS NEEDED
Status: DISCONTINUED | OUTPATIENT
Start: 2018-02-13 | End: 2018-02-13 | Stop reason: HOSPADM

## 2018-02-13 RX ORDER — SODIUM CHLORIDE 0.9 % (FLUSH) 0.9 %
5-10 SYRINGE (ML) INJECTION AS NEEDED
Status: DISCONTINUED | OUTPATIENT
Start: 2018-02-13 | End: 2018-02-13 | Stop reason: HOSPADM

## 2018-02-13 RX ORDER — DEXTROMETHORPHAN/PSEUDOEPHED 2.5-7.5/.8
1.2 DROPS ORAL
Status: CANCELLED | OUTPATIENT
Start: 2018-02-13

## 2018-02-13 RX ORDER — SODIUM CHLORIDE 0.9 % (FLUSH) 0.9 %
5-10 SYRINGE (ML) INJECTION EVERY 8 HOURS
Status: DISCONTINUED | OUTPATIENT
Start: 2018-02-13 | End: 2018-02-13 | Stop reason: HOSPADM

## 2018-02-13 RX ORDER — SODIUM CHLORIDE 0.9 % (FLUSH) 0.9 %
5-10 SYRINGE (ML) INJECTION EVERY 8 HOURS
Status: CANCELLED | OUTPATIENT
Start: 2018-02-13 | End: 2018-02-13

## 2018-02-13 RX ORDER — ATROPINE SULFATE 0.1 MG/ML
0.5 INJECTION INTRAVENOUS
Status: CANCELLED | OUTPATIENT
Start: 2018-02-13 | End: 2018-02-13

## 2018-02-13 RX ORDER — SODIUM CHLORIDE 0.9 % (FLUSH) 0.9 %
5-10 SYRINGE (ML) INJECTION AS NEEDED
Status: CANCELLED | OUTPATIENT
Start: 2018-02-13 | End: 2018-02-13

## 2018-02-13 RX ORDER — PROPOFOL 10 MG/ML
INJECTION, EMULSION INTRAVENOUS AS NEEDED
Status: DISCONTINUED | OUTPATIENT
Start: 2018-02-13 | End: 2018-02-13 | Stop reason: HOSPADM

## 2018-02-13 RX ORDER — INSULIN LISPRO 100 [IU]/ML
INJECTION, SOLUTION INTRAVENOUS; SUBCUTANEOUS ONCE
Status: DISCONTINUED | OUTPATIENT
Start: 2018-02-13 | End: 2018-02-13 | Stop reason: HOSPADM

## 2018-02-13 RX ORDER — NALOXONE HYDROCHLORIDE 0.4 MG/ML
0.4 INJECTION, SOLUTION INTRAMUSCULAR; INTRAVENOUS; SUBCUTANEOUS
Status: CANCELLED | OUTPATIENT
Start: 2018-02-13 | End: 2018-02-13

## 2018-02-13 RX ORDER — SODIUM CHLORIDE, SODIUM LACTATE, POTASSIUM CHLORIDE, CALCIUM CHLORIDE 600; 310; 30; 20 MG/100ML; MG/100ML; MG/100ML; MG/100ML
75 INJECTION, SOLUTION INTRAVENOUS CONTINUOUS
Status: DISCONTINUED | OUTPATIENT
Start: 2018-02-13 | End: 2018-02-13 | Stop reason: HOSPADM

## 2018-02-13 RX ADMIN — LIDOCAINE HYDROCHLORIDE 40 MG: 20 INJECTION, SOLUTION EPIDURAL; INFILTRATION; INTRACAUDAL; PERINEURAL at 08:30

## 2018-02-13 RX ADMIN — FAMOTIDINE 20 MG: 10 INJECTION, SOLUTION INTRAVENOUS at 08:16

## 2018-02-13 RX ADMIN — SODIUM CHLORIDE, SODIUM LACTATE, POTASSIUM CHLORIDE, AND CALCIUM CHLORIDE 75 ML/HR: 600; 310; 30; 20 INJECTION, SOLUTION INTRAVENOUS at 08:15

## 2018-02-13 RX ADMIN — PROPOFOL 50 MG: 10 INJECTION, EMULSION INTRAVENOUS at 08:37

## 2018-02-13 RX ADMIN — PROPOFOL 50 MG: 10 INJECTION, EMULSION INTRAVENOUS at 08:42

## 2018-02-13 RX ADMIN — PROPOFOL 50 MG: 10 INJECTION, EMULSION INTRAVENOUS at 08:30

## 2018-02-13 RX ADMIN — Medication 10 ML: at 08:20

## 2018-02-13 RX ADMIN — PROPOFOL 50 MG: 10 INJECTION, EMULSION INTRAVENOUS at 08:33

## 2018-02-13 NOTE — DISCHARGE INSTRUCTIONS
Colonoscopy: What to Expect at [address] [place] [address]  After you have a colonoscopy, you will stay at the clinic for 1 to 2 hours until the medicines wear off. Then you can go home. But you will need to arrange for a ride. Your doctor will tell you when you can eat and do your other usual activities. Your doctor will talk to you about when you will need your next colonoscopy. Your doctor can help you decide how often you need to be checked. This will depend on the results of your test and your risk for colorectal cancer. After the test, you may be bloated or have gas pains. You may need to pass gas. If a biopsy was done or a polyp was removed, you may have streaks of blood in your stool (feces) for a few days. This care sheet gives you a general idea about how long it will take for you to recover. But each person recovers at a different pace. Follow the steps below to get better as quickly as possible. How can you care for yourself at home? Activity  · Rest when you feel tired. · You can do your normal activities when it feels okay to do so. Diet  · Follow your doctor's directions for eating. · Unless your doctor has told you not to, drink plenty of fluids. This helps to replace the fluids that were lost during the colon prep. · Do not drink alcohol. Medicines  · If polyps were removed or a biopsy was done during the test, your doctor may tell you not to take aspirin or other anti-inflammatory medicines for a few days. These include ibuprofen (Advil, Motrin) and naproxen (Aleve). Other instructions  · For your safety, do not drive or operate machinery until the medicine wears off and you can think clearly. Your doctor may tell you not to drive or operate machinery until the day after your test.  · Do not sign legal documents or make major decisions until the medicine wears off and you can think clearly. The anesthesia can make it hard for you to fully understand what you are agreeing to.   Follow-up care is a key part of your treatment and safety. Be sure to make and go to all appointments, and call your doctor if you are having problems. It's also a good idea to know your test results and keep a list of the medicines you take. When should you call for help? Call 911 anytime you think you may need emergency care. For example, call if:  · You passed out (lost consciousness). · You pass maroon or bloody stools. · You have severe belly pain. Call your doctor now or seek immediate medical care if:  · Your stools are black and tarlike. · Your stools have streaks of blood, but you did not have a biopsy or any polyps removed. · You have belly pain, or your belly is swollen and firm. · You vomit. · You have a fever. · You are very dizzy. Watch closely for changes in your health, and be sure to contact your doctor if you have any problems. Where can you learn more? Go to [employer]over[employer]  Enter E264 in the search box to learn more about \"Colonoscopy: What to Expect at Home. \"   © 5973-9762 Healthwise, Incorporated. Care instructions adapted under license by [facility] (which disclaims liability or warranty for this information). This care instruction is for use with your licensed healthcare professional. If you have questions about a medical condition or this instruction, always ask your healthcare professional. [address] any warranty or liability for your use of this information. Content Version: 73.0.577157; Current as of: November 14, 2014     Colon Polyps: Care Instructions  Your Care Instructions    Colon polyps are growths in the colon or the rectum. The cause of most colon polyps is not known, and most people who get them do not have any problems. But a certain kind can turn into cancer. For this reason, regular testing for colon polyps is important for people age 48 and older and anyone who has an increased risk for colon cancer.   Polyps are usually found through routine colon cancer screening tests. Although most colon polyps are not cancerous, they are usually removed and then tested for cancer. Screening for colon cancer saves lives because the cancer can usually be cured if it is caught early. If you have a polyp that is the type that can turn into cancer, you may need more tests to examine your entire colon. The doctor will remove any other polyps that he or she finds, and you will be tested more often. Follow-up care is a key part of your treatment and safety. Be sure to make and go to all appointments, and call your doctor if you are having problems. It's also a good idea to know your test results and keep a list of the medicines you take. How can you care for yourself at home? Regular exams to look for colon polyps are the best way to prevent polyps from turning into colon cancer. These can include stool tests, sigmoidoscopy, colonoscopy, and CT colonography. Talk with your doctor about a testing schedule that is right for you. To prevent polyps  There is no home treatment that can prevent colon polyps. But these steps may help lower your risk for cancer. · Stay active. Being active can help you get to and stay at a healthy weight. Try to exercise on most days of the week. Walking is a good choice. · Eat well. Choose a variety of vegetables, fruits, legumes (such as peas and beans), fish, poultry, and whole grains. · Do not smoke. If you need help quitting, talk to your doctor about stop-smoking programs and medicines. These can increase your chances of quitting for good. · If you drink alcohol, limit how much you drink. Limit alcohol to 2 drinks a day for men and 1 drink a day for women. When should you call for help? Call your doctor now or seek immediate medical care if:  ? · You have severe belly pain. ? · Your stools are maroon or very bloody. ? Watch closely for changes in your health, and be sure to contact your doctor if:  ? · You have a fever.   ? · You have nausea or vomiting. ? · You have a change in bowel habits (new constipation or diarrhea). ? · Your symptoms get worse or are not improving as expected. Where can you learn more? Go to http://[place]-johnson.info/. Enter 95 [id number] in the search box to learn more about \"Colon Polyps: Care Instructions. \"  Current as of: May 12, 2017  Content Version: 11.4  © 0833-7488 [employer]. Care instructions adapted under license by [employer] (which disclaims liability or warranty for this information). If you have questions about a medical condition or this instruction, always ask your healthcare professional. [place] [address] any warranty or liability for your use of this information. DISCHARGE SUMMARY from Nurse     POST-PROCEDURE INSTRUCTIONS:    Call your Physician if you:  ? Observe any excess bleeding. ? Develop a temperature over 100.5o F.  ? Experience abdominal, shoulder or chest pain. ? Notice any signs of decreased circulation or nerve impairment to an extremity such as a change in color, persistent numbness, tingling, coldness or increase in pain. ? Vomit blood or you have nausea and vomiting lasting longer than 4 hours. ? Are unable to take medications. ? Are unable to urinate within 8 hours after discharge following general anesthesia or intravenous sedation. For the next 24 hours after receiving general anesthesia or intravenous sedation, or while taking prescription Narcotics, limit your activities:  ? Do NOT drive a motor vehicle, operate hazard machinery or power tools, or perform tasks that require coordination. The medication you received during your procedure may have some effect on your mental awareness. ? Do NOT make important personal or business decisions. The medication you received during your procedure may have some effect on your mental awareness. ? Do NOT drink alcoholic beverages.   These drinks do not mix well with the medications that have been given to you. ? Upon discharge from the hospital, you must be accompanied by a responsible adult. ? Resume your diet as directed by your physician. ? Resume medications as your physician has prescribed. ? Please give a list of your current medications to your Primary Care Provider. ? Please update this list whenever your medications are discontinued, doses are changed, or new medications (including over-the-counter products) are added. ? Please carry medication information at all times in case of emergency situations. These are general instructions for a healthy lifestyle:    No smoking/ No tobacco products/ Avoid exposure to second hand smoke.  Surgeon General's Warning:  Quitting smoking now greatly reduces serious risk to your health. Obesity, smoking, and a sedentary lifestyle greatly increase your risk for illness.  A healthy diet, regular physical exercise & weight monitoring are important for maintaining a healthy lifestyle   You may be retaining fluid if you have a history of heart failure or if you experience any of the following symptoms:  Weight gain of 3 pounds or more overnight or 5 pounds in a week, increased swelling in our hands or feet or shortness of breath while lying flat in bed. Please call your doctor as soon as you notice any of these symptoms; do not wait until your next office visit. Recognize signs and symptoms of STROKE:  F  -  Face looks uneven  A  -  Arms unable to move or move unevenly  S  -  Speech slurred or non-existent  T  -  Time to call 911 - as soon as signs and symptoms begin - DO NOT go back to bed or wait to see If you get better - TIME IS BRAIN. Colorectal Screening   Colorectal cancer almost always develops from precancerous polyps (abnormal growths) in the colon or rectum. Screening tests can find precancerous polyps, so that they can be removed before they turn into cancer.  Screening tests can also find colorectal cancer early, when treatment works best.  Taylor Martin Speak with your physician about when you should begin screening and how often you should be tested. Additional Information    If you have questions, please call [phone]. Remember, [employer] is NOT to be used for urgent needs. For medical emergencies, dial 911. Educational references and/or instructions provided during this visit included:    Colon Polyps      APPOINTMENTS:    Please make a follow-up appointment with your physician. Discharge information has been reviewed with the patient. The patient verbalized understanding.

## 2018-02-13 NOTE — ANESTHESIA PREPROCEDURE EVALUATION
Anesthetic History   No history of anesthetic complications            Review of Systems / Medical History  Patient summary reviewed and pertinent labs reviewed    Pulmonary  Within defined limits                 Neuro/Psych   Within defined limits           Cardiovascular    Hypertension: well controlled              Exercise tolerance: >4 METS     GI/Hepatic/Renal           PUD    Comments: H/O COLON CANCER Endo/Other        Obesity and arthritis     Other Findings   Comments: Documentation of current medication  Current medications obtained, documented and obtained? YES      Risk Factors for Postoperative nausea/vomiting:       History of postoperative nausea/vomiting? NO       Female? YES       Motion sickness? NO       Intended opioid administration for postoperative analgesia? YES      Smoking Abstinence:  Current Smoker? NO  Elective Surgery? YES  Seen preoperatively by anesthesiologist or proxy prior to day of surgery? YES  Pt abstained from smoking 24 hours prior to anesthesia?  YES    Preventive care/screening for High Blood Pressure:  Aged 18 years and older: YES  Screened for high blood pressure: YES  Patients with high blood pressure referred to primary care provider   for BP management: YES                 Physical Exam    Airway  Mallampati: II  TM Distance: > 6 cm  Neck ROM: normal range of motion   Mouth opening: Normal     Cardiovascular  Regular rate and rhythm,  S1 and S2 normal,  no murmur, click, rub, or gallop             Dental    Dentition: Edentulous     Pulmonary  Breath sounds clear to auscultation               Abdominal  GI exam deferred       Other Findings            Anesthetic Plan    ASA: 2  Anesthesia type: MAC          Induction: Intravenous  Anesthetic plan and risks discussed with: Patient

## 2018-02-13 NOTE — IP AVS SNAPSHOT
[address] [facility] 
 
 
 [address] [address] [address] [address] [phone] Patient: Samuel Dutta MRN: [id number] :1945 About your hospitalization You were admitted on:  2018 You last received care in the:  [facility] You were discharged on:  2018 Why you were hospitalized Your primary diagnosis was:  Not on File Follow-up Information Follow up With Details Comments Contact Info Washington Epperson MD   [address] [place] [address] [address] [facility] 
[phone] Trevon Alex, [address] [facility] [address] [address] [address] [phone] Your Scheduled Appointments 2018 10:30 AM EDT ROUTINE CARE with Trevon Alex MD  
[facility] (--)  
 [address] [address] [address] [address] [phone] Discharge Orders None A check mary indicates which time of day the medication should be taken. My Medications CONTINUE taking these medications Instructions Each Dose to Equal  
 Morning Noon Evening Bedtime ADULT LOW DOSE ASPIRIN 81 mg tablet Generic drug:  aspirin delayed-release Your last dose was: Your next dose is: Take 1 Tab by mouth daily. 81 mg  
    
   
   
   
  
 cholecalciferol 1,000 unit Cap Commonly known as:  VITAMIN D3 Your last dose was: Your next dose is: Take 1,000 Units by mouth every other day. 1000 Units  
    
   
   
   
  
 methocarbamol 500 mg tablet Commonly known as:  ROBAXIN Your last dose was: Your next dose is: Take 1 Tab by mouth nightly as needed. 500 mg  
    
   
   
   
  
 raloxifene 60 mg tablet Commonly known as:  EVISTA Your last dose was: Your next dose is:    
   
   
 60 mg daily. 60 mg Discharge Instructions Colonoscopy: What to Expect at [facility] Your Recovery After you have a colonoscopy, you will stay at the clinic for 1 to 2 hours until the medicines wear off. Then you can go home. But you will need to arrange for a ride. Your doctor will tell you when you can eat and do your other usual activities. Your doctor will talk to you about when you will need your next colonoscopy. Your doctor can help you decide how often you need to be checked. This will depend on the results of your test and your risk for colorectal cancer. After the test, you may be bloated or have gas pains. You may need to pass gas. If a biopsy was done or a polyp was removed, you may have streaks of blood in your stool (feces) for a few days. This care sheet gives you a general idea about how long it will take for you to recover. But each person recovers at a different pace. Follow the steps below to get better as quickly as possible. How can you care for yourself at home? Activity · Rest when you feel tired. · You can do your normal activities when it feels okay to do so. Diet · Follow your doctor's directions for eating. · Unless your doctor has told you not to, drink plenty of fluids. This helps to replace the fluids that were lost during the colon prep. · Do not drink alcohol. Medicines · If polyps were removed or a biopsy was done during the test, your doctor may tell you not to take aspirin or other anti-inflammatory medicines for a few days. These include ibuprofen (Advil, Motrin) and naproxen (Aleve). Other instructions · For your safety, do not drive or operate machinery until the medicine wears off and you can think clearly. Your doctor may tell you not to drive or operate machinery until the day after your test. 
· Do not sign legal documents or make major decisions until the medicine wears off and you can think clearly. The anesthesia can make it hard for you to fully understand what you are agreeing to. Follow-up care is a key part of your treatment and safety. Be sure to make and go to all appointments, and call your doctor if you are having problems. It's also a good idea to know your test results and keep a list of the medicines you take. When should you call for help? Call 911 anytime you think you may need emergency care. For example, call if: 
· You passed out (lost consciousness). · You pass maroon or bloody stools. · You have severe belly pain. Call your doctor now or seek immediate medical care if: 
· Your stools are black and tarlike. · Your stools have streaks of blood, but you did not have a biopsy or any polyps removed. · You have belly pain, or your belly is swollen and firm. · You vomit. · You have a fever. · You are very dizzy. Watch closely for changes in your health, and be sure to contact your doctor if you have any problems. Where can you learn more? Go to OKCoin.be Enter E264 in the search box to learn more about \"Colonoscopy: What to Expect at Home. \"  
© 6916-9283 Healthwise, Incorporated. Care instructions adapted under license by Everette Lacy (which disclaims liability or warranty for this information). This care instruction is for use with your licensed healthcare professional. If you have questions about a medical condition or this instruction, always ask your healthcare professional. [address] any warranty or liability for your use of this information. Content Version: 67.4.298199; Current as of: November 14, 2014 Colon Polyps: Care Instructions Your Care Instructions Colon polyps are growths in the colon or the rectum. The cause of most colon polyps is not known, and most people who get them do not have any problems. But a certain kind can turn into cancer. For this reason, regular testing for colon polyps is important for people age 48 and older and anyone who has an increased risk for colon cancer. Polyps are usually found through routine colon cancer screening tests. Although most colon polyps are not cancerous, they are usually removed and then tested for cancer. Screening for colon cancer saves lives because the cancer can usually be cured if it is caught early. If you have a polyp that is the type that can turn into cancer, you may need more tests to examine your entire colon. The doctor will remove any other polyps that he or she finds, and you will be tested more often. Follow-up care is a key part of your treatment and safety. Be sure to make and go to all appointments, and call your doctor if you are having problems. It's also a good idea to know your test results and keep a list of the medicines you take. How can you care for yourself at home? Regular exams to look for colon polyps are the best way to prevent polyps from turning into colon cancer. These can include stool tests, sigmoidoscopy, colonoscopy, and CT colonography. Talk with your doctor about a testing schedule that is right for you. To prevent polyps There is no home treatment that can prevent colon polyps. But these steps may help lower your risk for cancer. · Stay active. Being active can help you get to and stay at a healthy weight. Try to exercise on most days of the week. Walking is a good choice. · Eat well. Choose a variety of vegetables, fruits, legumes (such as peas and beans), fish, poultry, and whole grains. · Do not smoke. If you need help quitting, talk to your doctor about stop-smoking programs and medicines. These can increase your chances of quitting for good. · If you drink alcohol, limit how much you drink. Limit alcohol to 2 drinks a day for men and 1 drink a day for women. When should you call for help? Call your doctor now or seek immediate medical care if: 
? · You have severe belly pain. ? · Your stools are maroon or very bloody. ? Watch closely for changes in your health, and be sure to contact your doctor if: 
? · You have a fever. ? · You have nausea or vomiting. ? · You have a change in bowel habits (new constipation or diarrhea). ? · Your symptoms get worse or are not improving as expected. Where can you learn more? Go to http://virginia-johnson.info/. Enter 95 [id number] in the search box to learn more about \"Colon Polyps: Care Instructions. \" Current as of: May 12, 2017 Content Version: 11.4 © 4121-1037 [employer]. Care instructions adapted under license by [employer] (which disclaims liability or warranty for this information). If you have questions about a medical condition or this instruction, always ask your healthcare professional. [place] [address] any warranty or liability for your use of this information. DISCHARGE SUMMARY from Nurse POST-PROCEDURE INSTRUCTIONS: 
 
Call your Physician if you: 
? Observe any excess bleeding. ? Develop a temperature over 100.5o F. 
? Experience abdominal, shoulder or chest pain. ? Notice any signs of decreased circulation or nerve impairment to an extremity such as a change in color, persistent numbness, tingling, coldness or increase in pain. ? Vomit blood or you have nausea and vomiting lasting longer than 4 hours. ? Are unable to take medications. ? Are unable to urinate within 8 hours after discharge following general anesthesia or intravenous sedation. For the next 24 hours after receiving general anesthesia or intravenous sedation, or while taking prescription Narcotics, limit your activities: 
? Do NOT drive a motor vehicle, operate hazard machinery or power tools, or perform tasks that require coordination. The medication you received during your procedure may have some effect on your mental awareness. ? Do NOT make important personal or business decisions. The medication you received during your procedure may have some effect on your mental awareness. ? Do NOT drink alcoholic beverages. These drinks do not mix well with the medications that have been given to you. ? Upon discharge from the hospital, you must be accompanied by a responsible adult. ? Resume your diet as directed by your physician. ? Resume medications as your physician has prescribed. ? Please give a list of your current medications to your Primary Care Provider. ? Please update this list whenever your medications are discontinued, doses are changed, or new medications (including over-the-counter products) are added. ? Please carry medication information at all times in case of emergency situations. These are general instructions for a healthy lifestyle: No smoking/ No tobacco products/ Avoid exposure to second hand smoke. ? Surgeon General's Warning:  Quitting smoking now greatly reduces serious risk to your health. Obesity, smoking, and a sedentary lifestyle greatly increase your risk for illness. ? A healthy diet, regular physical exercise & weight monitoring are important for maintaining a healthy lifestyle ? You may be retaining fluid if you have a history of heart failure or if you experience any of the following symptoms:  Weight gain of 3 pounds or more overnight or 5 pounds in a week, increased swelling in our hands or feet or shortness of breath while lying flat in bed. Please call your doctor as soon as you notice any of these symptoms; do not wait until your next office visit. Recognize signs and symptoms of STROKE: 
F  -  Face looks uneven A  -  Arms unable to move or move unevenly S  -  Speech slurred or non-existent T  -  Time to call 911 - as soon as signs and symptoms begin - DO NOT go back to bed or wait to see If you get better - TIME IS BRAIN. Colorectal Screening ? Colorectal cancer almost always develops from precancerous polyps (abnormal growths) in the colon or rectum.   Screening tests can find precancerous polyps, so that they can be removed before they turn into cancer. Screening tests can also find colorectal cancer early, when treatment works best. 
? Speak with your physician about when you should begin screening and how often you should be tested. Additional Information If you have questions, please call [phone]. Remember, MyChart is NOT to be used for urgent needs. For medical emergencies, dial 911. Educational references and/or instructions provided during this visit included: 
 
Colon Polyps APPOINTMENTS: 
 
Please make a follow-up appointment with your physician. Discharge information has been reviewed with the patient. The patient verbalized understanding. ACO Transitions of Care Introducing [employer] [address] offers a voluntary care coordination program to provide high quality service and care to [facility] fee-for-service beneficiaries. Satish Alaniz was designed to help you enhance your health and well-being through the following services: ? Transitions of Care  support for individuals who are transitioning from one care setting to another (example: Hospital to home). ? Chronic and Complex Care Coordination  support for individuals and caregivers of those with serious or chronic illnesses or with more than one chronic (ongoing) condition and those who take a number of different medications. If you meet specific medical criteria, a [facility] [address] may call you directly to coordinate your care with your primary care physician and your other care providers. For questions about the [facility] programs, please, contact your physicians office. For general questions or additional information about Accountable Care Organizations: 
Please visit www.medicare.gov/acos. html or call 1-800-MEDICARE (2-643.433.5877) TTY users should call 6-146.868.6070. Introducing [facility]! Rober Alejandro introduces PlatformQ patient portal. Now you can access parts of your medical record, email your doctor's office, and request medication refills online. 1. In your internet browser, go to [email or web address] 2. Click on the First Time User? Click Here link in the Sign In box. You will see the New Member Sign Up page. 3. Enter your PlatformQ Access Code exactly as it appears below. You will not need to use this code after youve completed the sign-up process. If you do not sign up before the expiration date, you must request a new code. · PlatformQ Access Code: [email or web address] Expires: 5/13/2018 11:13 AM 
 
4. Enter the last four digits of your Social Security Number (xxxx) and Date of Birth (mm/dd/yyyy) as indicated and click Submit. You will be taken to the next sign-up page. 5. Create a PlatformQ ID. This will be your PlatformQ login ID and cannot be changed, so think of one that is secure and easy to remember. 6. Create a PlatformQ password. You can change your password at any time. 7. Enter your Password Reset Question and Answer. This can be used at a later time if you forget your password. 8. Enter your e-mail address. You will receive e-mail notification when new information is available in [address] 9. Click Sign Up. You can now view and download portions of your medical record. 10. Click the Download Summary menu link to download a portable copy of your medical information. If you have questions, please visit the Frequently Asked Questions section of the PlatformQ website. Remember, PlatformQ is NOT to be used for urgent needs. For medical emergencies, dial 911. Now available from your iPhone and Android! Providers Seen During Your Hospitalization Provider Specialty Primary office phone Guillermo Marc MD Gastroenterology [phone] Your Primary Care Physician (PCP) Primary Care Physician Office Phone Office Fax Kash Chand [phone] You are allergic to the following No active allergies Recent Documentation Height Weight Breastfeeding? BMI OB Status Smoking Status 1.638 m 96.6 kg No 36 kg/m2 Hysterectomy Former Smoker Emergency Contacts Name Discharge Info Relation Home Work Mobile 11 [facility] CAREGIVER [3] Spouse [3] [phone] Patient Belongings The following personal items are in your possession at time of discharge: 
  Dental Appliances: Lowers, Uppers, Retainer(s)  Visual Aid: Glasses Please provide this summary of care documentation to your next provider. Signatures-by signing, you are acknowledging that this After Visit Summary has been reviewed with you and you have received a copy. Patient Signature:  ____________________________________________________________ Date:  ____________________________________________________________  
  
Albert Nicholson Provider Signature:  ____________________________________________________________ Date:  ____________________________________________________________

## 2018-02-13 NOTE — ANESTHESIA POSTPROCEDURE EVALUATION
Post-Anesthesia Evaluation and Assessment    Patient: Wenceslao Butler MRN: [id number]  SSN: [id number]    YOB: 1945  Age: 71 y.o. Sex: female       Cardiovascular Function/Vital Signs  Visit Vitals    /83    Pulse 60    Temp 36.4 °C (97.6 °F)    Resp 14    Ht 5' 4.5\" (1.638 m)    Wt 96.6 kg (213 lb)    SpO2 98%    Breastfeeding No    BMI 36 kg/m2       Patient is status post MAC anesthesia for Procedure(s):  COLONOSCOPY / polypectomy. Nausea/Vomiting: None    Postoperative hydration reviewed and adequate. Pain:  Pain Scale 1: Numeric (0 - 10) (02/13/18 0910)  Pain Intensity 1: 0 (02/13/18 0910)   Managed    Neurological Status: At baseline    Mental Status and Level of Consciousness: Arousable    Pulmonary Status:   O2 Device: Room air (02/13/18 0910)   Adequate oxygenation and airway patent    Complications related to anesthesia: None    Post-anesthesia assessment completed.  No concerns      Signed By: Lulu Tipton MD     February 13, 2018

## 2018-02-13 NOTE — H&P
[facility]    [email or web address]      Impression: 1. Heme positive stool. 2. Hx of polyps. Plan:     1. [place] with MAC      Chief Complaint: Heme positive stool      HPI:  Luis F Quintanilla is a 71 y.o. female who is being seen preop for heme positive stool and hx of polyps. Last colo was in 2016. PMH:   Past Medical History:   Diagnosis Date    Breast mass     right,  excisional biopsy 1/15,  atypical ductal hyperplasia ([facility] 1/15)    Cardiac echocardiogram 12/02/2016    Ltd study to evaluate LA size. EF 65%. No RWMA. LA size was upper limits of normal (decreased in size since prev study of 9/23/16). Mild MR.         PSH:   Past Surgical History:   Procedure Laterality Date    COLONOSCOPY N/A 5/19/2016    COLONOSCOPY with polypectomy performed by Ignacia Vallejo [facility]    HX HYSTERECTOMY         Social HX:   Social History     Social History    Marital status:      Spouse name: N/A    Number of children: N/A    Years of education: N/A     Occupational History    Not on file. Social History Main Topics    Smoking status: Former Smoker     Years: 13.00    Smokeless tobacco: Never Used      Comment: occasional, stopped at age 37    Alcohol use No    Drug use: No    Sexual activity: Yes     Partners: Male     Other Topics Concern    Not on file     Social History Narrative       FHX:   Family History   Problem Relation Age of Onset    Hypertension Mother     Prostate Cancer Father        Allergy:   No Known Allergies    Home Medications:     Prescriptions Prior to Admission   Medication Sig    methocarbamol (ROBAXIN) 500 mg tablet Take 1 Tab by mouth nightly as needed.  aspirin delayed-release (ADULT LOW DOSE ASPIRIN) 81 mg tablet Take 1 Tab by mouth daily.  cholecalciferol (VITAMIN D3) 1,000 unit cap Take 1,000 Units by mouth every other day.  raloxifene (EVISTA) 60 mg tablet 60 mg daily.        Review of Systems:     Constitutional: No fevers, chills, weight loss, fatigue. Cardiovascular: No chest pain, heart palpitations. Respiratory: No cough, SOB, wheezing, chest discomfort, orthopnea. Gastrointestinal: No GI or oabdominal complaints       Musculoskeletal: No weakness, arthralgias, wasting. Allergies: As noted. There were no vitals taken for this visit. Physical Assessment:     constitutional: appearance: well developed, well nourished, normal habitus, no deformities, in no acute distress. ENMT: mouth: normal oral mucosa,lips and gums; good dentition. oropharynx: normal tongue, hard and soft palate; posterior pharynx without erithema, exudate or lesions. respiratory: effort: normal chest excursion; no intercostal retraction or accessory muscle use. cardiovascular: abdominal aorta: normal size and position; no bruits. palpation: PMI of normal size and position; normal rhythm; no thrill or murmurs. abdominal: abdomen: normal consistency; no tenderness or masses. hernias: no hernias appreciated. liver: normal size and consistency. spleen: not palpable. rectal: hemoccult/guaiac: not performed. musculoskeletal: digits and nails: no clubbing, cyanosis, petechiae or other inflammatory conditions. psychiatric: orientation: oriented to time, space and person. Anastacio De La Vega MD, M.D. [facility], [address] [place] [address]  Pager [phone]  [email or web address]

## 2018-02-13 NOTE — PROCEDURES
Andrés  [facility], [address]      Brief Procedure Note    Krystal Rings  1945  [id number]    Date of Procedure: 2/13/2018    Preoperative diagnosis: 578.1 - K92.1,  Blood in stool / hematochezia  792.1 - R19.5, Occult blood in stools  V12.72 - Z86.010,  Personal history of colon polyps    Postoperative diagnosis:  Colon Polyp    Type of Anesthesia: MAC (monitered anesthesia care)    Description of Findings: same as post op dx    Procedure: Procedure(s):  COLONOSCOPY / polypectomy    :  Dr. hCristiana Rhodes MD    Assistant(s): [unfilled]    Type of Anesthesia:MAC     EBL:None    Specimens:   ID Type Source Tests Collected by Time Destination   1 : Sigmoid Polyp Preservative Sigmoid  Christiana Rhodes MD 2/13/2018 1079 Pathology       Findings: See printed and scanned procedure note    Complications: None    Dr. Christiana Rhodes MD  2/13/2018  8:44 AM

## 2018-08-15 ENCOUNTER — OFFICE VISIT (OUTPATIENT)
Dept: FAMILY MEDICINE CLINIC | Age: 73
End: 2018-08-15

## 2018-08-15 VITALS
DIASTOLIC BLOOD PRESSURE: 90 MMHG | BODY MASS INDEX: 36.32 KG/M2 | TEMPERATURE: 98.6 F | RESPIRATION RATE: 16 BRPM | HEART RATE: 63 BPM | SYSTOLIC BLOOD PRESSURE: 131 MMHG | HEIGHT: 65 IN | WEIGHT: 218 LBS

## 2018-08-15 DIAGNOSIS — Z12.31 ENCOUNTER FOR SCREENING MAMMOGRAM FOR MALIGNANT NEOPLASM OF BREAST: ICD-10-CM

## 2018-08-15 DIAGNOSIS — Z00.00 MEDICARE ANNUAL WELLNESS VISIT, SUBSEQUENT: Primary | ICD-10-CM

## 2018-08-15 DIAGNOSIS — Z23 ENCOUNTER FOR IMMUNIZATION: ICD-10-CM

## 2018-08-15 DIAGNOSIS — Z71.89 ACP (ADVANCE CARE PLANNING): ICD-10-CM

## 2018-08-15 PROBLEM — E66.01 SEVERE OBESITY (BMI 35.0-39.9): Status: ACTIVE | Noted: 2018-08-15

## 2018-08-15 NOTE — PATIENT INSTRUCTIONS
Please contact our office if you have any questions about your visit today. Medicare Wellness Visit, Female     The best way to live healthy is to have a lifestyle where you eat a well-balanced diet, exercise regularly, limit alcohol use, and quit all forms of tobacco/nicotine, if applicable. Regular preventive services are another way to keep healthy. Preventive services (vaccines, screening tests, monitoring & exams) can help personalize your care plan, which helps you manage your own care. Screening tests can find health problems at the earliest stages, when they are easiest to treat. [facility] follows the current, evidence-based guidelines published by the [facility] States Jerry Kiki (USPSTF) when recommending preventive services for our patients. Because we follow these guidelines, sometimes recommendations change over time as research supports it. (For example, mammograms used to be recommended annually. Even though Medicare will still pay for an annual mammogram, the newer guidelines recommend a mammogram every two years for women of average risk.)  Of course, you and your doctor may decide to screen more often for some diseases, based on your risk and your health status. Preventive services for you include:  - Medicare offers their members a free annual wellness visit, which is time for you and your primary care provider to discuss and plan for your preventive service needs. Take advantage of this benefit every year!  -All adults over the age of 72 should receive the recommended pneumonia vaccines. Current USPSTF guidelines recommend a series of two vaccines for the best pneumonia protection.   -All adults should have a flu vaccine yearly and a tetanus vaccine every 10 years. All adults age 61 and older should receive a shingles vaccine once in their lifetime.    -A bone mass density test is recommended when a woman turns 65 to screen for osteoporosis.  This test is only recommended one time, as a screening. Some providers will use this same test as a disease monitoring tool if you already have osteoporosis. -All adults age 38-68 who are overweight should have a diabetes screening test once every three years.   -Other screening tests and preventive services for persons with diabetes include: an eye exam to screen for diabetic retinopathy, a kidney function test, a foot exam, and stricter control over your cholesterol.   -Cardiovascular screening for adults with routine risk involves an electrocardiogram (ECG) at intervals determined by your doctor.   -Colorectal cancer screenings should be done for adults age 54-65 with no increased risk factors for colorectal cancer. There are a number of acceptable methods of screening for this type of cancer. Each test has its own benefits and drawbacks. Discuss with your doctor what is most appropriate for you during your annual wellness visit. The different tests include: colonoscopy (considered the best screening method), a fecal occult blood test, a fecal DNA test, and sigmoidoscopy. -Breast cancer screenings are recommended every other year for women of normal risk, age 54-69.  -Cervical cancer screenings for women over age 72 are only recommended with certain risk factors.   -All adults born between [facility] should be screened once for Hepatitis C. Here is a list of your current Health Maintenance items (your personalized list of preventive services) with a due date:  Health Maintenance Due   Topic Date Due    Pneumococcal Vaccine (2 of 2 - PCV13) 03/03/2017    Glaucoma Screening   01/04/2018    Annual Well Visit  06/29/2018    Flu Vaccine  08/01/2018           Vaccine Information Statement     Pneumococcal Conjugate Vaccine (PCV13): What You Need to Know    Many Vaccine Information Statements are available in Amharic and other languages. See www.immunize.org/vis.   Hojas de información Sobre Vacunas están disponibles en español y en muchos otros idiomas. Visite www.immunize.org/vis. 1. Why get vaccinated? Vaccination can protect both children and adults from pneumococcal disease. Pneumococcal disease is caused by bacteria that can spread from person to person through close contact. It can cause ear infections, and it can also lead to more serious infections of the:   Lungs (pneumonia),   Blood (bacteremia), and   Covering of the brain and spinal cord (meningitis). Pneumococcal pneumonia is most common among adults. Pneumococcal meningitis can cause deafness and brain damage, and it kills about 1 child in 10 who get it. Anyone can get pneumococcal disease, but children under 3years of age and adults 72 years and older, people with certain medical conditions, and cigarette smokers are at the highest risk. Before there was a vaccine, the [facility] saw:   more than 700 cases of meningitis,   about 13,000 blood infections,   about 5 million ear infections, and   about 200 deaths  in children under 5 each year from pneumococcal disease. Since vaccine became available, severe pneumococcal disease in these children has fallen by 88%. About 18,000 older adults die of pneumococcal disease each year in the [place]. Treatment of pneumococcal infections with penicillin and other drugs is not as effective as it used to be, because some strains of the disease have become resistant to these drugs. This makes prevention of the disease, through vaccination, even more important. 2. PCV13 vaccine    Pneumococcal conjugate vaccine (called PCV13) protects against 13 types of pneumococcal bacteria. PCV13 is routinely given to children at 2, 4, 6, and 1515 months of age. It is also recommended for children and adults 3to 59years of age with certain health conditions, and for all adults 72years of age and older. Your doctor can give you details.     3. Some people should not get this vaccine    Anyone who has ever had a life-threatening allergic reaction to a dose of this vaccine, to an earlier pneumococcal vaccine called PCV7, or to any vaccine containing diphtheria toxoid (for example, DTaP), should not get PCV13. Anyone with a severe allergy to any component of PCV13 should not get the vaccine. Tell your doctor if the person being vaccinated has any severe allergies. If the person scheduled for vaccination is not feeling well, your healthcare provider might decide to reschedule the shot on another day. 4. Risks of a vaccine reaction    With any medicine, including vaccines, there is a chance of reactions. These are usually mild and go away on their own, but serious reactions are also possible. Problems reported following PCV13 varied by age and dose in the series. The most common problems reported among children were:    About half became drowsy after the shot, had a temporary loss of appetite, or had redness or tenderness where the shot was given.  About 1 out of 3 had swelling where the shot was given.  About 1 out of 3 had a mild fever, and about 1 in 20 had a fever over 102.2°F.   Up to about 8 out of 10 became fussy or irritable. Adults have reported pain, redness, and swelling where the shot was given; also mild fever, fatigue, headache, chills, or muscle pain. Emmanuel Carlo children who get PCV13 along with inactivated flu vaccine at the same time may be at increased risk for seizures caused by fever. Ask your doctor for more information. Problems that could happen after any vaccine:     People sometimes faint after a medical procedure, including vaccination. Sitting or lying down for about 15 minutes can help prevent fainting, and injuries caused by a fall. Tell your doctor if you feel dizzy, or have vision changes or ringing in the ears.  Some older children and adults get severe pain in the shoulder and have difficulty moving the arm where a shot was given.  This happens very rarely.  Any medication can cause a severe allergic reaction. Such reactions from a vaccine are very rare, estimated at about 1 in a million doses, and would happen within a few minutes to a few hours after the vaccination. As with any medicine, there is a very small chance of a vaccine causing a serious injury or death. The safety of vaccines is always being monitored. For more information, visit: www.cdc.gov/vaccinesafety/     5. What if there is a serious reaction? What should I look for?  Look for anything that concerns you, such as signs of a severe allergic reaction, very high fever, or unusual behavior. Signs of a severe allergic reaction can include hives, swelling of the face and throat, difficulty breathing, a fast heartbeat, dizziness, and weakness - usually within a few minutes to a few hours after the vaccination. What should I do?  If you think it is a severe allergic reaction or other emergency that cant wait, call 9-1-1 or get the person to the nearest hospital. Otherwise, call your doctor. Reactions should be reported to the Vaccine Adverse Event Reporting System (VAERS). Your doctor should file this report, or you can do it yourself through the VAERS web site at www.vaers. [facility].gov, or by calling 3-937.458.7166. [employer] does not give medical advice. 6. The National Vaccine Injury Compensation Program    The [facility] Vaccine Injury Compensation Program (VICP) is a federal program that was created to compensate people who may have been injured by certain vaccines. Persons who believe they may have been injured by a vaccine can learn about the program and about filing a claim by calling 5-578.642.2973 or visiting the [employer] website at www.[facility].gov/vaccinecompensation. There is a time limit to file a claim for compensation. 7. How can I learn more?  Ask your healthcare provider.  He or she can give you the vaccine package insert or suggest other sources of information.  Call your local or state health department.  Contact the Centers for Disease Control and Prevention (CDC):  - Call 1-362.695.8521 (1-800-CDC-INFO) or  - Visit CDCs website at www.cdc.gov/vaccines    Vaccine Information Statement   PCV13 Vaccine   11/5/2015   42 MALLORY Ramires [id number]    Department of Health and Human Services  Centers for Disease Control and Prevention    Office Use Only

## 2018-08-15 NOTE — ACP (ADVANCE CARE PLANNING)
Advance Care Planning (ACP) Provider Note - Comprehensive     Date of ACP Conversation: 08/15/18  Persons included in Conversation:  patient  Length of ACP Conversation in minutes:  16 minutes    Authorized Decision Maker (if patient is incapable of making informed decisions): This person is: Other Legally Authorized Decision Maker (e.g. Next of Kin)          General ACP for ALL Patients with Decision Making Capacity:   Importance of advance care planning, including choosing a healthcare agent to communicate patient's healthcare decisions if patient lost the ability to make decisions, such as after a sudden illness or accident  Understanding of the healthcare agent role was assessed and information provided  Exploration of values, goals, and preferences if recovery is not expected, even with continued medical treatment in the event of: Imminent death  \"In these circumstances, what matters most to you? \"  pt is undecided    Review of Existing Advance Directive:  pt does not have an AD    For Serious or Chronic Illness:  Understanding of medical condition      Interventions Provided:  Recommended completion of Advance Directive form after review of ACP materials and conversation with prospective healthcare agent

## 2018-08-15 NOTE — PROGRESS NOTES
This is the Subsequent Medicare Annual Wellness Exam, performed 12 months or more after the Initial AWV or the last Subsequent AWV    I have reviewed the patient's medical history in detail and updated the computerized patient record. 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 EF 65%. No RWMA. LA size was upper limits of normal (decreased in size since prev study of 9/23/16). Mild MR.      Colon polyps     Hemorrhoids       Past Surgical History:   Procedure Laterality Date    BREAST SURGERY PROCEDURE UNLISTED      biopsy, uncertain of which side    COLONOSCOPY N/A 5/19/2016    COLONOSCOPY with polypectomy performed by Johnny De Dios MD at [facility]    COLONOSCOPY N/A 2/13/2018    COLONOSCOPY / polypectomy performed by Anh Smith MD at [facility]    HX HYSTERECTOMY       Current Outpatient Prescriptions   Medication Sig Dispense Refill    raloxifene (EVISTA) 60 mg tablet 60 mg daily. 0    methocarbamol (ROBAXIN) 500 mg tablet Take 1 Tab by mouth nightly as needed. 20 Tab 0    aspirin delayed-release (ADULT LOW DOSE ASPIRIN) 81 mg tablet Take 1 Tab by mouth daily. 30 Tab prn    cholecalciferol (VITAMIN D3) 1,000 unit cap Take 1,000 Units by mouth every other day.        No Known Allergies  Family History   Problem Relation Age of Onset    Hypertension Mother     Prostate Cancer Father      Social History   Substance Use Topics    Smoking status: Former Smoker     Years: 13.00    Smokeless tobacco: Never Used      Comment: occasional, stopped at age 37    Alcohol use No     Patient Active Problem List   Diagnosis Code    AGE (acute gastroenteritis) K52.9    Colon polyp, colonoscopy, 1/2011 K63.5    Elevated blood pressure [id number]    Breast pain, left N64.4    Abnormal EKG R94.31    ACP (advance care planning) Z71.89    Osteoarthritis of lumbar spine M47.816    Tubular adenoma of colon, colonscopy 5/2016 D12.6    Abnormal echocardiogram R93.1    Hypertension I10    Dyslipidemia E78.5    Severe obesity (BMI 35.0-39.9) ([facility]) E66.01       Depression Risk Factor Screening:     PHQ over the last two weeks 8/15/2018   Little interest or pleasure in doing things Not at all   Feeling down, depressed, irritable, or hopeless Not at all   Total Score PHQ 2 0     Alcohol Risk Factor Screening: You do not drink alcohol or very rarely. Functional Ability and Level of Safety:   Hearing Loss  Hearing is good. Activities of Daily Living  The home contains: no safety equipment. Patient does total self care    Fall Risk  Fall Risk Assessment, last 12 mths 8/15/2018   Able to walk? Yes   Fall in past 12 months? No       Abuse Screen  Patient is not abused    Cognitive Screening   Evaluation of Cognitive Function:  Has your family/caregiver stated any concerns about your memory: no  Normal    Patient Care Team   Patient Care Team:  Sarah Mckeon MD as PCP - General (Family Practice)  Melba Davidson MD as Physician (Urology)  Vale Max DO (Cardiology)    Assessment/Plan   Education and counseling provided:  End-of-Life planning (with patient's consent)  Pneumococcal Vaccine  Influenza Vaccine  Screening Mammography  Screening for glaucoma    Diagnoses and all orders for this visit:    1. Medicare annual wellness visit, subsequent    2. Encounter for immunization  -     Pneumococcal Conjugate vaccine - 13 valent (50 years and older)  -     ADMIN PNEUMOCOCCAL VACCINE  Medicare Injection Admin Charge    3. Encounter for screening mammogram for malignant neoplasm of breast  -     [facility] 3D LACEY W MAMMO BI SCREENING INCL CAD; Future    4.  ACP (advance care planning)      Health Maintenance Due   Topic Date Due    GLAUCOMA SCREENING Q2Y  01/04/2018    Influenza Age 5 to Adult  08/01/2018

## 2018-08-15 NOTE — MR AVS SNAPSHOT
[address] [id number] [place] [id number] [phone] Patient: Fam Conklin MRN: [id number] :1945 Visit Information Date & Time Provider Department Dept. Phone Encounter #  
 8/15/2018 12:15 PM Adele Lares MD [address] [facility] [phone] [id number] Upcoming Health Maintenance Date Due  
 GLAUCOMA SCREENING Q2Y 2018 Influenza Age 5 to Adult 2018 MEDICARE YEARLY EXAM 2019 BREAST CANCER SCRN MAMMOGRAM 10/23/2019 COLONOSCOPY 2023 DTaP/Tdap/Td series (2 - Td) 11/15/2027 Allergies as of 8/15/2018  Review Complete On: 8/15/2018 By: Adele Lares MD  
 No Known Allergies Current Immunizations  Reviewed on 8/15/2018 Name Date Pneumococcal Conjugate (PCV-13) 8/15/2018 Pneumococcal Polysaccharide (PPSV-23) 3/3/2016 Tdap 11/15/2017 12:53 PM  
  
 Reviewed by Adele Lares MD on 8/15/2018 at  1:10 PM  
You Were Diagnosed With   
  
 Codes Comments Medicare annual wellness visit, subsequent    -  Primary ICD-10-CM: Z00.00 ICD-9-CM: V70.0 Encounter for immunization     ICD-10-CM: G64 ICD-9-CM: V03.89 Encounter for screening mammogram for malignant neoplasm of breast     ICD-10-CM: Z12.31 
ICD-9-CM: V76.12   
 ACP (advance care planning)     ICD-10-CM: Z71.89 ICD-9-CM: V65.49 Vitals BP Pulse Temp Resp Height(growth percentile) Weight(growth percentile) 131/90 63 98.6 °F (37 °C) (Oral) 16 5' 4.5\" (1.638 m) 218 lb (98.9 kg) BMI OB Status Smoking Status 36.84 kg/m2 Hysterectomy Former Smoker BMI and BSA Data Body Mass Index Body Surface Area  
 36.84 kg/m 2 2.12 m 2 Preferred Pharmacy Pharmacy Name Phone 800 [address] [phone] Your Updated Medication List  
  
   
This list is accurate as of 8/15/18  1:40 PM.  Always use your most recent med list.  
  
  
  
  
 ADULT LOW DOSE ASPIRIN 81 mg tablet Generic drug:  aspirin delayed-release Take 1 Tab by mouth daily. cholecalciferol 1,000 unit Cap Commonly known as:  VITAMIN D3 Take 1,000 Units by mouth every other day. methocarbamol 500 mg tablet Commonly known as:  ROBAXIN Take 1 Tab by mouth nightly as needed. raloxifene 60 mg tablet Commonly known as:  EVISTA 60 mg daily. We Performed the Following ADMIN PNEUMOCOCCAL VACCINE [ hospitals] PNEUMOCOCCAL CONJ VACCINE 13 VALENT IM [id number] CPT(R)] To-Do List   
 08/15/2018 Imaging:  HORACIO 3D LACEY W MAMMO BI SCREENING INCL CAD Patient Instructions Please contact our office if you have any questions about your visit today. Medicare Wellness Visit, Female The best way to live healthy is to have a lifestyle where you eat a well-balanced diet, exercise regularly, limit alcohol use, and quit all forms of tobacco/nicotine, if applicable. Regular preventive services are another way to keep healthy. Preventive services (vaccines, screening tests, monitoring & exams) can help personalize your care plan, which helps you manage your own care. Screening tests can find health problems at the earliest stages, when they are easiest to treat. [facility] follows the current, evidence-based guidelines published by the [facility] and [employer] [facility] [place] (USPSTF) when recommending preventive services for our patients. Because we follow these guidelines, sometimes recommendations change over time as research supports it. (For example, mammograms used to be recommended annually. Even though Medicare will still pay for an annual mammogram, the newer guidelines recommend a mammogram every two years for women of average risk.) Of course, you and your doctor may decide to screen more often for some diseases, based on your risk and your health status. Preventive services for you include: - Medicare offers their members a free annual wellness visit, which is time for you and your primary care provider to discuss and plan for your preventive service needs. Take advantage of this benefit every year! 
-All adults over the age of 72 should receive the recommended pneumonia vaccines. Current USPSTF guidelines recommend a series of two vaccines for the best pneumonia protection.  
-All adults should have a flu vaccine yearly and a tetanus vaccine every 10 years. All adults age 61 and older should receive a shingles vaccine once in their lifetime.   
-A bone mass density test is recommended when a woman turns 65 to screen for osteoporosis. This test is only recommended one time, as a screening. Some providers will use this same test as a disease monitoring tool if you already have osteoporosis. -All adults age 38-68 who are overweight should have a diabetes screening test once every three years.  
-Other screening tests and preventive services for persons with diabetes include: an eye exam to screen for diabetic retinopathy, a kidney function test, a foot exam, and stricter control over your cholesterol.  
-Cardiovascular screening for adults with routine risk involves an electrocardiogram (ECG) at intervals determined by your doctor.  
-Colorectal cancer screenings should be done for adults age 54-65 with no increased risk factors for colorectal cancer. There are a number of acceptable methods of screening for this type of cancer. Each test has its own benefits and drawbacks. Discuss with your doctor what is most appropriate for you during your annual wellness visit. The different tests include: colonoscopy (considered the best screening method), a fecal occult blood test, a fecal DNA test, and sigmoidoscopy. -Breast cancer screenings are recommended every other year for women of normal risk, age 54-69. -Cervical cancer screenings for women over age 72 are only recommended with certain risk factors.  
-All adults born between [facility] should be screened once for Hepatitis C. Here is a list of your current Health Maintenance items (your personalized list of preventive services) with a due date: 
Health Maintenance Due Topic Date Due  Pneumococcal Vaccine (2 of 2 - PCV13) 03/03/2017  Glaucoma Screening   01/04/2018 Clinton Giana Annual Well Visit  06/29/2018  Flu Vaccine  08/01/2018 Vaccine Information Statement Pneumococcal Conjugate Vaccine (PCV13): What You Need to Know Many Vaccine Information Statements are available in Sami and other languages. See www.immunize.org/vis. Hojas de información Sobre Vacunas están disponibles en español y en muchos otros idiomas. Visite www.immunize.org/vis. 1. Why get vaccinated? Vaccination can protect both children and adults from pneumococcal disease. Pneumococcal disease is caused by bacteria that can spread from person to person through close contact. It can cause ear infections, and it can also lead to more serious infections of the: 
 Lungs (pneumonia),  Blood (bacteremia), and 
 Covering of the brain and spinal cord (meningitis). Pneumococcal pneumonia is most common among adults. Pneumococcal meningitis can cause deafness and brain damage, and it kills about 1 child in 10 who get it. Anyone can get pneumococcal disease, but children under 3years of age and adults 72 years and older, people with certain medical conditions, and cigarette smokers are at the highest risk. Before there was a vaccine, the [facility] saw: 
 more than 700 cases of meningitis, 
 about 13,000 blood infections, 
 about 5 million ear infections, and 
 about 200 deaths 
in children under 5 each year from pneumococcal disease. Since vaccine became available, severe pneumococcal disease in these children has fallen by 88%. About 18,000 older adults die of pneumococcal disease each year in the [place]. Treatment of pneumococcal infections with penicillin and other drugs is not as effective as it used to be, because some strains of the disease have become resistant to these drugs. This makes prevention of the disease, through vaccination, even more important. 2. PCV13 vaccine Pneumococcal conjugate vaccine (called PCV13) protects against 13 types of pneumococcal bacteria. PCV13 is routinely given to children at 2, 4, 6, and 1515 months of age. It is also recommended for children and adults 3to 59years of age with certain health conditions, and for all adults 72years of age and older. Your doctor can give you details. 3. Some people should not get this vaccine Anyone who has ever had a life-threatening allergic reaction to a dose of this vaccine, to an earlier pneumococcal vaccine called PCV7, or to any vaccine containing diphtheria toxoid (for example, DTaP), should not get PCV13. Anyone with a severe allergy to any component of PCV13 should not get the vaccine. Tell your doctor if the person being vaccinated has any severe allergies. If the person scheduled for vaccination is not feeling well, your healthcare provider might decide to reschedule the shot on another day. 4. Risks of a vaccine reaction With any medicine, including vaccines, there is a chance of reactions. These are usually mild and go away on their own, but serious reactions are also possible. Problems reported following PCV13 varied by age and dose in the series. The most common problems reported among children were:  About half became drowsy after the shot, had a temporary loss of appetite, or had redness or tenderness where the shot was given.  About 1 out of 3 had swelling where the shot was given.  About 1 out of 3 had a mild fever, and about 1 in 20 had a fever over 102.2°F.  Up to about 8 out of 10 became fussy or irritable. Adults have reported pain, redness, and swelling where the shot was given; also mild fever, fatigue, headache, chills, or muscle pain. Jose Ryan children who get PCV13 along with inactivated flu vaccine at the same time may be at increased risk for seizures caused by fever. Ask your doctor for more information. Problems that could happen after any vaccine:  People sometimes faint after a medical procedure, including vaccination. Sitting or lying down for about 15 minutes can help prevent fainting, and injuries caused by a fall. Tell your doctor if you feel dizzy, or have vision changes or ringing in the ears.  Some older children and adults get severe pain in the shoulder and have difficulty moving the arm where a shot was given. This happens very rarely.  Any medication can cause a severe allergic reaction. Such reactions from a vaccine are very rare, estimated at about 1 in a million doses, and would happen within a few minutes to a few hours after the vaccination. As with any medicine, there is a very small chance of a vaccine causing a serious injury or death. The safety of vaccines is always being monitored. For more information, visit: www.cdc.gov/vaccinesafety/  
 
5. What if there is a serious reaction? What should I look for?  Look for anything that concerns you, such as signs of a severe allergic reaction, very high fever, or unusual behavior. Signs of a severe allergic reaction can include hives, swelling of the face and throat, difficulty breathing, a fast heartbeat, dizziness, and weakness  usually within a few minutes to a few hours after the vaccination. What should I do?  If you think it is a severe allergic reaction or other emergency that cant wait, call 9-1-1 or get the person to the nearest hospital. Otherwise, call your doctor. Reactions should be reported to the Vaccine Adverse Event Reporting System (VAERS). Your doctor should file this report, or you can do it yourself through the VAERS web site at www.vaers. hhs.gov, or by calling 0-532.882.1447. VAERS does not give medical advice. 6. The National Vaccine Injury Compensation Program 
 
The [facility] Vaccine Injury Compensation Program (VICP) is a federal program that was created to compensate people who may have been injured by certain vaccines. Persons who believe they may have been injured by a vaccine can learn about the program and about filing a claim by calling 0-720.274.9958 or visiting the [employer] website at www.[facility].gov/vaccinecompensation. There is a time limit to file a claim for compensation. 7. How can I learn more?  Ask your healthcare provider. He or she can give you the vaccine package insert or suggest other sources of information.  Call your local or state health department.  Contact the Centers for Disease Control and Prevention (CDC): 
- Call 5-888.766.3027 (1-800-CDC-INFO) or 
- Visit CDCs website at www.cdc.gov/vaccines Vaccine Information Statement PCV13 Vaccine 11/5/2015  
[address] [id number] [facility] of Health and PrivacyCentral Centers for Disease Control and Prevention Office Use Only Introducing [facility]! Maryellen Vasquez introduces C-sam patient portal. Now you can access parts of your medical record, email your doctor's office, and request medication refills online. 1. In your internet browser, go to [email or web address]. [email or web address] 2. Click on the First Time User? Click Here link in the Sign In box. You will see the New Member Sign Up page. 3. Enter your C-sam Access Code exactly as it appears below. You will not need to use this code after youve completed the sign-up process. If you do not sign up before the expiration date, you must request a new code. · [employer]olk[employer] Access Code: [email or web address] Expires: 11/13/2018  1:37 PM 
 
4. Enter the last four digits of your Social Security Number (xxxx) and Date of Birth (mm/dd/yyyy) as indicated and click Submit. You will be taken to the next sign-up page. 5. Create a ZolkC ID. This will be your [employer]C login ID and cannot be changed, so think of one that is secure and easy to remember. 6. Create a ZolkC password. You can change your password at any time. 7. Enter your Password Reset Question and Answer. This can be used at a later time if you forget your password. 8. Enter your e-mail address. You will receive e-mail notification when new information is available in [address] 9. Click Sign Up. You can now view and download portions of your medical record. 10. Click the Download Summary menu link to download a portable copy of your medical information. If you have questions, please visit the Frequently Asked Questions section of the [employer] website. Remember, [employer] is NOT to be used for urgent needs. For medical emergencies, dial 911. Now available from your iPhone and Android! Please provide this summary of care documentation to your next provider. Your primary care clinician is listed as RONA MIRANDA. If you have any questions after today's visit, please call [phone].

## 2018-08-16 ENCOUNTER — TELEPHONE (OUTPATIENT)
Dept: FAMILY MEDICINE CLINIC | Age: 73
End: 2018-08-16

## 2018-08-16 NOTE — TELEPHONE ENCOUNTER
Chief Complaint   Patient presents with    Other     Patient called and stated that she had some redness at the site of the injection she received yesterday. Patient denies any fever, hives, difficulty swallowing, or breathing. Patient states that she did not have pain today. Patient instructed to read over the VIS given to her at the time of the appointment and if she experiences any of the severe reaction symptoms to go to the ER. Patient verbalizes understanding of the symptoms. Patient also instructed to use ice and OTC pain relieve for the area.

## 2018-10-31 LAB — MAMMOGRAPHY, EXTERNAL: NORMAL

## 2019-01-21 DIAGNOSIS — Z12.31 ENCOUNTER FOR SCREENING MAMMOGRAM FOR MALIGNANT NEOPLASM OF BREAST: ICD-10-CM

## 2019-03-26 ENCOUNTER — OFFICE VISIT (OUTPATIENT)
Dept: FAMILY MEDICINE CLINIC | Age: 74
End: 2019-03-26

## 2019-03-26 VITALS
DIASTOLIC BLOOD PRESSURE: 84 MMHG | HEIGHT: 65 IN | SYSTOLIC BLOOD PRESSURE: 148 MMHG | HEART RATE: 71 BPM | RESPIRATION RATE: 16 BRPM | WEIGHT: 215 LBS | TEMPERATURE: 97.9 F | BODY MASS INDEX: 35.82 KG/M2

## 2019-03-26 DIAGNOSIS — Z82.49 FAMILY HISTORY OF CARDIOVASCULAR DISEASE: ICD-10-CM

## 2019-03-26 DIAGNOSIS — S83.92XA SPRAIN OF LEFT KNEE, UNSPECIFIED LIGAMENT, INITIAL ENCOUNTER: Primary | ICD-10-CM

## 2019-03-26 DIAGNOSIS — S46.912A STRAIN OF LEFT SHOULDER, INITIAL ENCOUNTER: ICD-10-CM

## 2019-03-26 RX ORDER — NAPROXEN 500 MG/1
500 TABLET ORAL 2 TIMES DAILY WITH MEALS
Qty: 60 TAB | Refills: 1 | Status: SHIPPED | OUTPATIENT
Start: 2019-03-26 | End: 2019-10-18 | Stop reason: SDUPTHER

## 2019-03-26 NOTE — PROGRESS NOTES
Chief Complaint   Patient presents with    Shoulder Pain     left     Arm Pain     left    Knee Pain     left       Health Maintenance Due   Topic Date Due    Shingrix Vaccine Age 50> (1 of 2) 10/24/1996    GLAUCOMA SCREENING Q2Y  01/04/2018    Influenza Age 5 to Adult  08/01/2018       Health Maintenance reviewed     1. Have you been to the ER, urgent care clinic since your last visit? Hospitalized since your last visit? No    2. Have you seen or consulted any other health care providers outside of the [address] [place] [address] since your last visit? Include any pap smears or colon screening.  No

## 2019-03-26 NOTE — PROGRESS NOTES
Chief Complaint   Patient presents with    Shoulder Pain     left     Arm Pain     left    Knee Pain     left     HISTORY OF PRESENT ILLNESS  Anika Garcia is a 68 y.o. female. HPI  Pt here for eval of joint pains. A few weeks ago, pt had L shoulder pain. She had drom. She could not sleep on that side. she took tylenol prn. It has mostly improved at this point. There were no injuries prior to onset of the shoulder pain. Pt has had L knee pain for 2 days. It started after breakfast.  There was no recent trauma or injury although she fell on it over 30 yrs ago. With the recent onset of pain, she had to use a crutch or cane until today. Review of Systems   Constitutional: Negative. HENT: Negative. Respiratory: Negative. Cardiovascular: Negative. Musculoskeletal: Positive for joint pain. All other systems reviewed and are negative. Should: + neers  Physical Exam   Constitutional: She is oriented to person, place, and time. She appears well-developed and well-nourished. No distress. HENT:   Head: Normocephalic and atraumatic. Right Ear: External ear normal.   Left Ear: External ear normal.   Nose: Nose normal.   Eyes: Conjunctivae are normal.   Neck: Normal range of motion. Neck supple. Cardiovascular: Normal rate, regular rhythm, normal heart sounds and intact distal pulses. Exam reveals no gallop and no friction rub. No murmur heard. Pulmonary/Chest: Effort normal and breath sounds normal. She has no wheezes. She has no rhonchi. She has no rales. Musculoskeletal: Normal range of motion. Left shoulder: She exhibits pain. She exhibits normal range of motion, no tenderness, no spasm and normal strength. L shoulder:  + Neers  L knee: peripatellar ttp   Neurological: She is alert and oriented to person, place, and time. Coordination normal.   Skin: Skin is warm and dry. Psychiatric: She has a normal mood and affect.  Her behavior is normal. Judgment and thought content normal.   Nursing note and vitals reviewed. ASSESSMENT and PLAN  Diagnoses and all orders for this visit:    1. Sprain of left knee, unspecified ligament, initial encounter  -     naproxen (NAPROSYN) 500 mg tablet; Take 1 Tab by mouth two (2) times daily (with meals). Consider PT if no improvement. 2. Strain of left shoulder, initial encounter  -     naproxen (NAPROSYN) 500 mg tablet; Take 1 Tab by mouth two (2) times daily (with meals). Consider PT if no improvement. 3. Family history of cardiovascular disease  -     METABOLIC PANEL, COMPREHENSIVE; Future  -     LIPID PANEL; Future      Follow-up and Dispositions    · Return in about 1 month (around 4/23/2019) for , shoulder pain.

## 2019-04-23 ENCOUNTER — OFFICE VISIT (OUTPATIENT)
Dept: FAMILY MEDICINE CLINIC | Age: 74
End: 2019-04-23

## 2019-04-23 VITALS
OXYGEN SATURATION: 98 % | SYSTOLIC BLOOD PRESSURE: 143 MMHG | HEIGHT: 64 IN | RESPIRATION RATE: 20 BRPM | HEART RATE: 61 BPM | WEIGHT: 214.6 LBS | BODY MASS INDEX: 36.64 KG/M2 | DIASTOLIC BLOOD PRESSURE: 80 MMHG | TEMPERATURE: 97.9 F

## 2019-04-23 DIAGNOSIS — M25.512 ACUTE PAIN OF LEFT SHOULDER: Primary | ICD-10-CM

## 2019-04-23 DIAGNOSIS — M25.562 ACUTE PAIN OF LEFT KNEE: ICD-10-CM

## 2019-04-23 PROBLEM — E55.9 VITAMIN D DEFICIENCY: Status: ACTIVE | Noted: 2018-01-23

## 2019-04-23 NOTE — PROGRESS NOTES
Brett Calle presents today for   Chief Complaint   Patient presents with    Knee Pain     Left; Follow Up    Arm Pain     Left; Follow Up    Shoulder Pain     Left; Follow Up       Is someone accompanying this pt? No    Is the patient using any DME equipment during OV? No    Depression Screening:  3 most recent PHQ Screens 3/26/2019   Little interest or pleasure in doing things Not at all   Feeling down, depressed, irritable, or hopeless Not at all   Total Score PHQ 2 0       Learning Assessment:  Learning Assessment 3/26/2019   PRIMARY LEARNER Patient   HIGHEST LEVEL OF EDUCATION - PRIMARY LEARNER  -   BARRIERS PRIMARY LEARNER -   CO-LEARNER CAREGIVER -   PRIMARY LANGUAGE ENGLISH   LEARNER PREFERENCE PRIMARY DEMONSTRATION   ANSWERED BY patient   RELATIONSHIP SELF       Abuse Screening:  Abuse Screening Questionnaire 3/26/2019   Do you ever feel afraid of your partner? N   Are you in a relationship with someone who physically or mentally threatens you? N   Is it safe for you to go home? Y       Fall Screening  Fall Risk Assessment, last 12 mths 3/26/2019   Able to walk? Yes   Fall in past 12 months? No         Health Maintenance Due   Topic Date Due    Shingrix Vaccine Age 49> (1 of 2) 10/24/1996    GLAUCOMA SCREENING Q2Y  01/04/2018   . Health Maintenance reviewed and discussed and ordered per Provider. Brett Calle is updated on all     Coordination of Care  1. Have you been to the ER, urgent care clinic since your last visit? Hospitalized since your last visit? No    2. Have you seen or consulted any other health care providers outside of the [address] [place] [address] since your last visit? Include any pap smears or colon screening. No        Advance Directive:  1. Do you have an advance directive in place? Patient Reply:No    2. If not, would you like material regarding how to put one in place?  Patient Reply: No

## 2019-04-23 NOTE — PROGRESS NOTES
HPI  Here to follow up on left knee,arm and shoulder pain    Left knee- had been swollen. Was given Naproxyn which was helpful. No longer a concern for her. Left shoulder/ arm- reports pain when trying to lift arm to get something out of cabinet or if she lies on it. Describes this as an aching pain. No numbness or tingling, except when she lies on that side and it gets tingly. No weakness    No pain presently. Still has Naprosyn. Says this this is effective for her. No other concerns today    Noted that she has labs ordered that were not completed. Pt reports that she had labs drawn at her oncology visit. Not sure if lipid panel was among labs ordered. Will check with oncology and if not, will go and get lab work done at [facility]. Past Medical History  Past Medical History:   Diagnosis Date    Breast mass     right,  excisional biopsy 1/15,  atypical ductal hyperplasia ([facility] 1/15)    Cardiac echocardiogram 12/02/2016    Ltd study to evaluate LA size. EF 65%. No RWMA. LA size was upper limits of normal (decreased in size since prev study of 9/23/16). Mild MR.      Colon polyps     Hemorrhoids        Surgical History  Past Surgical History:   Procedure Laterality Date    BREAST SURGERY PROCEDURE UNLISTED      biopsy, uncertain of which side    COLONOSCOPY N/A 5/19/2016    COLONOSCOPY with polypectomy performed by [facility] at [facility] ENDOSCOPY    COLONOSCOPY N/A 2/13/2018    COLONOSCOPY / polypectomy performed by Demarco Catherine MD at [facility] ENDOSCOPY    HX HYSTERECTOMY          Medications  Current Outpatient Medications   Medication Sig Dispense Refill    naproxen (NAPROSYN) 500 mg tablet Take 1 Tab by mouth two (2) times daily (with meals). 60 Tab 1    cholecalciferol (VITAMIN D3) 1,000 unit cap Take 1,000 Units by mouth every other day.  raloxifene (EVISTA) 60 mg tablet 60 mg daily. 0    methocarbamol (ROBAXIN) 500 mg tablet Take 1 Tab by mouth nightly as needed.  20 Tab 0    aspirin delayed-release (ADULT LOW DOSE ASPIRIN) 81 mg tablet Take 1 Tab by mouth daily.  30 Tab prn       Allergies  No Known Allergies    Family History  Family History   Problem Relation Age of Onset    Hypertension Mother     Prostate Cancer Father        Social History  Social History     Socioeconomic History    Marital status:      Spouse name: Not on file    Number of children: Not on file    Years of education: Not on file    Highest education level: Not on file   Occupational History    Not on file   Social Needs    Financial resource strain: Not on file    Food insecurity:     Worry: Not on file     Inability: Not on file    Transportation needs:     Medical: Not on file     Non-medical: Not on file   Tobacco Use    Smoking status: Former Smoker     Years: 13.00    Smokeless tobacco: Never Used    Tobacco comment: occasional, stopped at age 37   Substance and Sexual Activity    Alcohol use: No    Drug use: No    Sexual activity: Yes     Partners: Male   Lifestyle    Physical activity:     Days per week: Not on file     Minutes per session: Not on file    Stress: Not on file   Relationships    Social connections:     Talks on phone: Not on file     Gets together: Not on file     Attends [employer]ism service: Not on file     Active member of club or organization: Not on file     Attends meetings of clubs or organizations: Not on file     Relationship status: Not on file    Intimate partner violence:     Fear of current or ex partner: Not on file     Emotionally abused: Not on file     Physically abused: Not on file     Forced sexual activity: Not on file   Other Topics Concern    Not on file   Social History Narrative    Not on file       Problem List  Patient Active Problem List   Diagnosis Code    AGE (acute gastroenteritis) K52.9    Colon polyp, colonoscopy, 1/2011 K63.5    Elevated blood pressure R03.0    Breast pain, left N64.4    Abnormal EKG R94.31    ACP (advance care planning) Z71.89    Osteoarthritis of lumbar spine M47.816    Tubular adenoma of colon, colonscopy 5/2016 D12.6    Abnormal echocardiogram R93.1    Hypertension I10    Dyslipidemia E78.5    Severe obesity (BMI 35.0-39. 9) E66.01    Atypical ductal hyperplasia of breast N60.99    Pain in joint M25.50    Vitamin D deficiency E55.9    Acute pain of left shoulder M25.512    Acute pain of left knee M25.562       Review of Systems  Review of Systems   Constitutional: Negative. Respiratory: Negative. Cardiovascular: Negative. Musculoskeletal: Positive for joint pain. Left shoulder    Neurological: Negative. Psychiatric/Behavioral: Negative. Vital Signs  Vitals:    04/23/19 0926   BP: 143/80   Pulse: 61   Resp: 20   Temp: 97.9 °F (36.6 °C)   TempSrc: Oral   SpO2: 98%   Weight: 214 lb 9.6 oz (97.3 kg)   Height: 5' 4\" (1.626 m)   PainSc:   0 - No pain       Physical Exam  Physical Exam   Constitutional: She is oriented to person, place, and time. She appears well-developed and well-nourished. Cardiovascular: Normal rate, regular rhythm and normal heart sounds. Pulmonary/Chest: Effort normal and breath sounds normal.   Musculoskeletal: She exhibits no edema, tenderness or deformity. Muscle strength 5/5 miryam upper extremities. Pain with extension of arm past 90 degrees   Neurological: She is alert and oriented to person, place, and time. Skin: Skin is warm and dry. Diagnostics  Orders Placed This Encounter    [facility]     Referral Priority:   Routine     Referral Type:   PT/OT/ST     Referral Reason:   Specialty Services Required     Requested Specialty:   Physical Therapy     Number of Visits Requested:   1       Results  Results for orders placed or performed in visit on 10/31/18    MAMMOGRAPHY   Result Value Ref Range    Mammography, External       Scattered areas of fibroglandular density.  No suspicious findings are present         Assessment and Plan  Diagnoses and all orders for this visit:    1. Acute pain of left shoulder  -     REFERRAL TO PHYSICAL THERAPY  Possible shoulder impingement. Continue with Naprosyn- take with food. Apply ice. Follow up if symptoms worsen/ do not improve   2. Acute pain of left knee  Pt reports that this has resolved and is not a concern at this time    Noted that she has labs ordered that were not completed. Pt reports that she had labs drawn at her oncology visit. Not sure if lipid panel was among labs ordered. Will check with oncology and if not, will go and get lab work done at [facility]. Encouraged to do this before follow up with Dr Shannen Yee      After care summary printed and reviewed with patient. Plan reviewed with patient. Questions answered. Patient verbalized understanding of plan and is in agreement with plan. Patient to follow up in one month or earlier if symptoms worsen. Encouraged the use of my chart.     MADISON Romero

## 2019-04-29 ENCOUNTER — HOSPITAL ENCOUNTER (OUTPATIENT)
Dept: LAB | Age: 74
Discharge: HOME | End: 2019-04-29
Payer: MEDICARE

## 2019-04-29 DIAGNOSIS — Z82.49 FAMILY HISTORY OF CARDIOVASCULAR DISEASE: ICD-10-CM

## 2019-04-29 LAB
ALBUMIN SERPL-MCNC: 3.3 G/DL (ref 3.4–5)
ALBUMIN/GLOB SERPL: 1 {RATIO} (ref 0.8–1.7)
ALP SERPL-CCNC: 82 U/L (ref 45–117)
ALT SERPL-CCNC: 16 U/L (ref 13–56)
ANION GAP SERPL CALC-SCNC: 5 MMOL/L (ref 3–18)
AST SERPL-CCNC: 17 U/L (ref 15–37)
BILIRUB SERPL-MCNC: 0.5 MG/DL (ref 0.2–1)
BUN SERPL-MCNC: 12 MG/DL (ref 7–18)
BUN/CREAT SERPL: 17 (ref 12–20)
CALCIUM SERPL-MCNC: 8.1 MG/DL (ref 8.5–10.1)
CHLORIDE SERPL-SCNC: 110 MMOL/L (ref 100–108)
CHOLEST SERPL-MCNC: 163 MG/DL
CO2 SERPL-SCNC: 28 MMOL/L (ref 21–32)
CREAT SERPL-MCNC: 0.72 MG/DL (ref 0.6–1.3)
GLOBULIN SER CALC-MCNC: 3.2 G/DL (ref 2–4)
GLUCOSE SERPL-MCNC: 83 MG/DL (ref 74–99)
HDLC SERPL-MCNC: 81 MG/DL (ref 40–60)
HDLC SERPL: 2 {RATIO} (ref 0–5)
LDLC SERPL CALC-MCNC: 68.2 MG/DL (ref 0–100)
LIPID PROFILE,FLP: ABNORMAL
POTASSIUM SERPL-SCNC: 3.8 MMOL/L (ref 3.5–5.5)
PROT SERPL-MCNC: 6.5 G/DL (ref 6.4–8.2)
SODIUM SERPL-SCNC: 143 MMOL/L (ref 136–145)
TRIGL SERPL-MCNC: 69 MG/DL (ref ?–150)
VLDLC SERPL CALC-MCNC: 13.8 MG/DL

## 2019-04-29 PROCEDURE — 36415 COLL VENOUS BLD VENIPUNCTURE: CPT

## 2019-04-29 PROCEDURE — 80053 COMPREHEN METABOLIC PANEL: CPT

## 2019-04-29 PROCEDURE — 80061 LIPID PANEL: CPT

## 2019-05-01 ENCOUNTER — HOSPITAL ENCOUNTER (OUTPATIENT)
Dept: PHYSICAL THERAPY | Age: 74
Discharge: HOME | End: 2019-05-01
Payer: MEDICARE

## 2019-05-01 DIAGNOSIS — M25.512 ACUTE PAIN OF LEFT SHOULDER: ICD-10-CM

## 2019-05-01 PROCEDURE — 97161 PT EVAL LOW COMPLEX 20 MIN: CPT

## 2019-05-01 PROCEDURE — 97110 THERAPEUTIC EXERCISES: CPT

## 2019-05-01 PROCEDURE — 97530 THERAPEUTIC ACTIVITIES: CPT

## 2019-05-01 NOTE — PROGRESS NOTES
[facility] [place]  [address] [facility]  [phone] fax    Plan of Care/ Statement of Necessity for Physical Therapy Services    Patient name: Ana Potter Start of Care: 2019   Referral source: Kade Willoughby NP : 1945    Medical Diagnosis: Acute pain of left shoulder [M25.512]  [place]or: [place] MEDICARE / Plan: [place] MEDICARE PART A & B / Product Type: Medicare /  Onset Date:2 months ago    Treatment Diagnosis: left shoulder pain   Prior Hospitalization: see medical history Provider#: [id number]   Medications: Verified on Patient summary List    Comorbidities: partial hysterectomy    Prior Level of Function: functionally ind, lives with spouse retired      [employer] of Care and following information is based on the information from the initial evaluation. Assessment/ key information: Patient is a 68year-old female who presents with chief complaint of intermittent left shoulder pain that began about 2 months ago. She noticed she was having pain when lying on her left side and wihen reaching overhead into cabinets. Pt presents with poor posture; protracted shoulders and forward head. She presents with + Aranda test, + painful arc sign, and pain with empty can indicating likely rotator cuff impingement. She presents with equal B shoulder flexion to 120 deg in sitting and good functional IR/ER AROM. Pt is TTP at left UT and infra/suprasinatus. Patient will benefit from skilled PT to improve left UE strength, scapular stability, and posture to reduce impingement symptoms with overhead activities for ease of daily tasks.        Evaluation Complexity History MEDIUM  Complexity : 1-2 comorbidities / personal factors will impact the outcome/ POC ; Examination MEDIUM Complexity : 3 Standardized tests and measures addressing body structure, function, activity limitation and / or participation in recreation  ;Presentation LOW Complexity : Stable, uncomplicated  ;Clinical Decision Making MEDIUM Complexity : FOTO score of 26-74  Overall Complexity Rating: LOW   Problem List: pain affecting function, decrease strength, decrease ADL/ functional abilitiies and decrease flexibility/ joint mobility   Treatment Plan may include any combination of the following: Therapeutic exercise, Therapeutic activities, Neuromuscular re-education, Physical agent/modality, Manual therapy, Patient education and Functional mobility training  Patient / Family readiness to learn indicated by: trying to perform skills  Persons(s) to be included in education: patient (P)  Barriers to Learning/Limitations: None  Patient Goal (s): reduce pain and strengthen   Patient Self Reported Health Status: good  Rehabilitation Potential: excellent     Short Term Goals: To be accomplished in 1 weeks:  1. Patient will be compliant with HEP to improve therapy outcomes. Long Term Goals: To be accomplished in 8 weeks:  1. Patient will increase FOTO score by 14 pts, 69/100, to show improved functional mobility and QOL. 2. Patient will report <2/10 pain when reaching over-head to improve ease of household chores. 3. Patient will increase left shoulder strength to 4+/5 to improve ease of ADLs  4. Patient will report 50% improvement in symptoms to restore PLOF. Frequency / Duration: Patient to be seen 2 times per week for 8 weeks. Patient/ Caregiver education and instruction: Diagnosis, prognosis, activity modification and exercises   [x]  Plan of care has been reviewed with PTA    Certification Period: 5/01/2019 to 7/28/2019  Belkys Mcadams, PT 5/1/2019 11:21 AM    ________________________________________________________________________    I certify that the above Therapy Services are being furnished while the patient is under my care. I agree with the treatment plan and certify that this therapy is necessary.     Physician's Signature:____________Date:_________TIME:________    [facility] Date and Time must be completed for valid certification **    Please sign and return to [facility]  [address] [place] [address]  [phone] fax

## 2019-05-01 NOTE — PROGRESS NOTES
PT DAILY TREATMENT NOTE/SHOULDER EVAL 10-18    Patient Name: Hamilton Jacob  Date:2019  : 1945  [x]  Patient  Verified  Payor: [place] MEDICARE / Plan: [place] MEDICARE PART A & B / Product Type: Medicare /    In time:1038  Out time:1115  Total Treatment Time (min): 37  Visit #: 1 of 16    Medicare/BCBS Only   Total Timed Codes (min):  23 1:1 Treatment Time:  23       Treatment Area: Acute pain of left shoulder [M25.512]    SUBJECTIVE  Pain Level (0-10 scale): 0/10  []constant []intermittent [x]improving []worsening []no change since onset    Any medication changes, allergies to medications, adverse drug reactions, diagnosis change, or new procedure performed?: [x] No    [] Yes (see summary sheet for update)  Subjective functional status/changes:       Left shoulder pain began about 2 months ago, noticed when she was lying on her left side when she has pain, throbbing pain and reaching in something in cabinet and has pain . 6-7/10 pain when reaching, or dressing  Lying on left side gets numbness in hand   PLOF: functionally independent   Current symptoms/Complaints: intermittent left shoulder pain   Work Hx: retired   Living Situation: lives with spouse  Pt Goals: reduce pain and strengthen     OBJECTIVE/EXAMINATION        14 min [x]Eval                  []Re-Eval       13 min Therapeutic Exercise:  [] See flow sheet : HEP   Rationale: increase ROM and increase strength to improve the patients ability to complete ADL's    10 min Therapeutic Activity:  []  See flow sheet : educated patient on findings using shoulder model, discussed role of posture, sleep positioning using pillows, use of modalities CP, discussed POC   Rationale: increase ROM and increase strength  to improve the patients ability to complete overhead tasks with decreased pain.              With   [] TE   [] TA   [] neuro   [] other: Patient Education: [x] Review HEP    [] Progressed/Changed HEP based on:   [] positioning   [] body mechanics [] transfers   [] heat/ice application    [] other:      Other Objective/Functional Measures:     Physical Therapy Evaluation - Shoulder    Posture: [] Poor    [x] Fair    [] Good    Describe: protracted shoulders, forward head    ROM:  [] Unable to assess at this time                                           AROM                                                              PROM   Left Right  Left Right   Flexion 150 155 Flexion     Extension   Extension     Scaption/ABD   Scaptin/ABD     ER @ 0 Degrees   ER @ 0 Degrees     ER @ 90 Degrees   ER @ 90 Degrees     IR @ 90 Degrees   IR @ 90 Degrees         Functional B ER C7  Functional IR: Left T9 right T11  Dislocated right shoulder in the 90's  Pain with eccentric lowering flexion  120 deg B shoulder flexion in sitting  Painful empty can      End Feel / Painful Arc:    Strength:   [] Unable to assess at this time                                                                            L (1-5) R (1-5) Pain   Flexors 4-/5 4/5 [] Yes   [] No   Abductors 4-/5 4-/5 [] Yes   [] No   External Rotators   [] Yes   [] No   Internal Rotators   [] Yes   [] No   Supraspinatus   [] Yes   [] No   Serratus Anterior   [] Yes   [] No   Lower Trapezius 4-/5 4-/5 [x] Yes   [] No   Elbow Flexion   [] Yes   [] No   Elbow Extension   [] Yes   [] No       Scapulohumoral Control / Rhythm:  Able to eccentrically lower with good control?  Left: [] Yes   [] No     Right: [] Yes   [] No    Accessory Motions:    Palpation  [] Min  [x] Mod  [] Severe    Location: TTP left UT  [] Min  [] Mod  [] Severe    Location:  [] Min  [] Mod  [] Severe    Location:    Optional Tests:    Sensation Left Right Reflexes Left Right   Biceps (C5)   Biceps (C5)     Puente Radial(C6-7)   Brachioradialis (C6)     Puente Ulnar(C8-T1)   Triceps (C7)       Adson's Test  [] Pos   [] Neg Yergason's Test [] Pos   [] Neg  Sarah's Test  [] Pos   [] Neg Kodak's Sign [] Pos   [] Neg  Neer's Test  [] Pos   [] Neg Clunk Test  [] Pos   [] Neg  Hawkin's Test  [x] Pos   [] Neg AC Joint  [] Pos   [] Neg  Speed's Test  [] Pos   [] Neg SC Joint  [] Pos   [] Neg  Empty Can  [x] Pos   [] Neg Pectoral Tightness [] Pos   [] Neg  Anterior Apprehension [] Pos   [] Neg   Posterior Apprehension [] Pos   [] Neg      Other Tests / Comments:        Pain Level (0-10 scale) post treatment: 0/10    ASSESSMENT/Changes in Function: see POC. Patient will continue to benefit from skilled PT services to modify and progress therapeutic interventions, address functional mobility deficits, address strength deficits, analyze and cue movement patterns and analyze and modify body mechanics/ergonomics to attain remaining goals.      [x]  See Plan of Care  []  See progress note/recertification  []  See Discharge Summary         Progress towards goals / Updated goals:  See POC    PLAN  []  Upgrade activities as tolerated     [x]  Continue plan of care  []  Update interventions per flow sheet       []  Discharge due to:_  []  Other:_      May Martinez, PT 5/1/2019  10:34 AM

## 2019-05-06 ENCOUNTER — HOSPITAL ENCOUNTER (OUTPATIENT)
Dept: PHYSICAL THERAPY | Age: 74
Discharge: HOME | End: 2019-05-06
Payer: MEDICARE

## 2019-05-06 PROCEDURE — 97110 THERAPEUTIC EXERCISES: CPT

## 2019-05-06 NOTE — PROGRESS NOTES
PT DAILY TREATMENT NOTE 10-18    Patient Name: Alfredo Mcfadden  Date:2019  : 1945  [x]  Patient  Verified  Payor: VA MEDICARE / Plan: VA MEDICARE PART A & B / Product Type: Medicare /    In time:200  Out time:228  Total Treatment Time (min): 28  Visit #: 2 of 16    Medicare/BCBS Only   Total Timed Codes (min):  28 1:1 Treatment Time:  28       Treatment Area: Pain in left shoulder [M25.512]    SUBJECTIVE  Pain Level (0-10 scale): 0/10  Any medication changes, allergies to medications, adverse drug reactions, diagnosis change, or new procedure performed?: [x] No    [] Yes (see summary sheet for update)  Subjective functional status/changes:   [] No changes reported  Pt stated that she has no pain and is doing well    OBJECTIVE    28 min Therapeutic Exercise:  [x] See flow sheet :   Rationale: increase ROM and increase strength to improve the patients ability to increase ease with ADLs    With   [x] TE   [] TA   [] neuro   [] other: Patient Education: [x] Review HEP    [] Progressed/Changed HEP based on:   [] positioning   [] body mechanics   [] transfers   [] heat/ice application    [] other:      Other Objective/Functional Measures:   Had no difficulty with exercises  No complaint of increased pain during session  Program was challenging  Needed minimal cueing for technique     Pain Level (0-10 scale) post treatment: 0/10    ASSESSMENT/Changes in Function:   Initiated therex today per flow sheet. Pt reported compliance with HEP. Pt put forth good effort with all exercises    Patient will continue to benefit from skilled PT services to address functional mobility deficits, address ROM deficits and address strength deficits to attain remaining goals. [x]  See Plan of Care  []  See progress note/recertification  []  See Discharge Summary         Progress towards goals / Updated goals:  Short Term Goals: To be accomplished in 1 weeks:  1.  Patient will be compliant with HEP to improve therapy outcomes. Goal met. 5/6/19  Long Term Goals: To be accomplished in 8 weeks:  1. Patient will increase FOTO score by 14 pts, 69/100, to show improved functional mobility and QOL. 2. Patient will report <2/10 pain when reaching over-head to improve ease of household chores. 3. Patient will increase left shoulder strength to 4+/5 to improve ease of ADLs  4. Patient will report 50% improvement in symptoms to restore PLOF.      PLAN  []  Upgrade activities as tolerated     [x]  Continue plan of care  []  Update interventions per flow sheet       []  Discharge due to:_  []  Other:_      Yusuf Rousseau PTA 5/6/2019  2:03 PM    Future Appointments   Date Time Provider Mayela Rahman   5/8/2019  2:30 PM Ashland Lowers [facility]   5/15/2019  9:00 AM Lilly Comings, PTA [facility]   5/20/2019 10:00 AM Lilly Comings, PTA [facility]   5/29/2019 10:00 AM Lilly Comings, PTA [facility]

## 2019-05-08 ENCOUNTER — APPOINTMENT (OUTPATIENT)
Dept: PHYSICAL THERAPY | Age: 74
End: 2019-05-08
Payer: MEDICARE

## 2019-05-09 ENCOUNTER — HOSPITAL ENCOUNTER (OUTPATIENT)
Dept: PHYSICAL THERAPY | Age: 74
Discharge: HOME | End: 2019-05-09
Payer: MEDICARE

## 2019-05-09 PROCEDURE — 97110 THERAPEUTIC EXERCISES: CPT

## 2019-05-09 NOTE — PROGRESS NOTES
PT DAILY TREATMENT NOTE 10-18    Patient Name: Faustino Hemphill  Date:2019  : 1945  [x]  Patient  Verified  Payor: VA MEDICARE / Plan: VA MEDICARE PART A & B / Product Type: Medicare /    In time:8:30  Out time:9:00  Total Treatment Time (min): 30  Visit #: 3 of 16    Medicare/BCBS Only   Total Timed Codes (min):  30 1:1 Treatment Time:  23       Treatment Area: Pain in left shoulder [M25.512]    SUBJECTIVE  Pain Level (0-10 scale): 3/10  Any medication changes, allergies to medications, adverse drug reactions, diagnosis change, or new procedure performed?: [x] No    [] Yes (see summary sheet for update)  Subjective functional status/changes:   [] No changes reported  Pt reports she has the most pain with reaching forward and laying on her left side. Denies any tingling. She believes therapy is helping. OBJECTIVE      30 min Therapeutic Exercise:  [x] See flow sheet :   Rationale: increase ROM and increase strength to improve the patients ability to improve ease of reaching/light lifting with ADLs        With   [] TE   [] TA   [] neuro   [] other: Patient Education: [x] Review HEP    [] Progressed/Changed HEP based on:   [] positioning   [] body mechanics   [] transfers   [] heat/ice application    [] other:      Other Objective/Functional Measures:     Required tactile cues to avoid shoulder shrug with 1/2 prone Y, improved form with cuing  No difficulty with 1# weight for SA punch or sidelying ER  No increased pain during session      Pain Level (0-10 scale) post treatment: 2/10    ASSESSMENT/Changes in Function:     Pt is making slow progress towards initial goals in therapy. Increased reps/resistance this visit without increased sx. Left shoulder strength continues to be decreased but is slowly improving. Will continue to address strength, ROM, posture, and scapulohumeral rhythm deficits for improved ease of reaching with ADLs and daily activities.          Patient will continue to benefit from skilled PT services to modify and progress therapeutic interventions, address functional mobility deficits, address ROM deficits, address strength deficits, analyze and address soft tissue restrictions, analyze and cue movement patterns, assess and modify postural abnormalities and instruct in home and community integration to attain remaining goals. Progress towards goals / Updated goals:  Short Term Goals: To be accomplished in 1 weeks:  1. Patient will be compliant with HEP to improve therapy outcomes. Goal met. 5/6/19  Long Term Goals: To be accomplished in 8 weeks:  1. Patient will increase FOTO score by 14 pts, 69/100, to show improved functional mobility and QOL. 2. Patient will report <2/10 pain when reaching over-head to improve ease of household chores. Not met. Pain continues to fluctuate. 3/10 prior to therapy this visit 5/9/19  3. Patient will increase left shoulder strength to 4+/5 to improve ease of ADLs  4.  Patient will report 50% improvement in symptoms to restore PLOF.        PLAN  []  Upgrade activities as tolerated     [x]  Continue plan of care  []  Update interventions per flow sheet       []  Discharge due to:_  []  Other:_      Lena Casarez, PT 5/9/2019  8:33 AM    Future Appointments   Date Time Provider Mayela Rahman   5/15/2019  9:00 AM Keely Smith PTA [facility]   5/20/2019 10:00 AM Keely Smith PTA [facility]   5/29/2019 10:00 AM Keely Smith PTA [facility]

## 2019-05-15 ENCOUNTER — HOSPITAL ENCOUNTER (OUTPATIENT)
Dept: PHYSICAL THERAPY | Age: 74
Discharge: HOME | End: 2019-05-15
Payer: MEDICARE

## 2019-05-15 PROCEDURE — 97110 THERAPEUTIC EXERCISES: CPT

## 2019-05-15 NOTE — PROGRESS NOTES
PT DAILY TREATMENT NOTE 10-18    Patient Name: Brett End  Date:5/15/2019  : 1945  [x]  Patient  Verified  Payor: [place] MED[place]ARE / Plan: [place] MEDICARE PART A & B / Product Type: Medicare /    In time:900  Out time:929  Total Treatment Time (min): 29  Visit #: 4 of 16    Medicare/BCBS Only   Total Timed Codes (min):  29 1:1 Treatment Time:  29       Treatment Area: Pain in left shoulder [M25.512]    SUBJECTIVE  Pain Level (0-10 scale): 1/10  Any medication changes, allergies to medications, adverse drug reactions, diagnosis change, or new procedure performed?: [x] No    [] Yes (see summary sheet for update)  Subjective functional status/changes:   [] No changes reported  Pt stated that she is doing pretty good today    OBJECTIVE    29 min Therapeutic Exercise:  [x] See flow sheet :   Rationale: increase ROM and increase strength to improve the patients ability to increase ease with ADLs    With   [x] TE   [] TA   [] neuro   [] other: Patient Education: [x] Review HEP    [] Progressed/Changed HEP based on:   [] positioning   [] body mechanics   [] transfers   [] heat/ice application    [] other:      Other Objective/Functional Measures:   Had no difficulty with exercises  Had slight increase in sh pain with W's  Will make increased per flow sheet next visit     Pain Level (0-10 scale) post treatment: 1/10    ASSESSMENT/Changes in Function:   Pt is progressing well toward goals. Strength and range of motion in left sh are improving. Pt reports increased ease with ADLs. Patient will continue to benefit from skilled PT services to modify and progress therapeutic interventions, address functional mobility deficits, address ROM deficits and address strength deficits to attain remaining goals. [x]  See Plan of Care  []  See progress note/recertification  []  See Discharge Summary         Progress towards goals / Updated goals:  Short Term Goals: To be accomplished in 1 weeks:  1.  Patient will be compliant with HEP to improve therapy outcomes. Goal met. 5/6/19  Long Term Goals: To be accomplished in 8 weeks:  1. Patient will increase FOTO score by 14 pts, 69/100, to show improved functional mobility and QOL. 2. Patient will report <2/10 pain when reaching over-head to improve ease of household chores. Not met. Pain continues to fluctuate. 3/10 prior to therapy this visit 5/9/19  3. Patient will increase left shoulder strength to 4+/5 to improve ease of ADLs  4.  Patient will report 50% improvement in symptoms to restore PLOF.      PLAN  []  Upgrade activities as tolerated     [x]  Continue plan of care  []  Update interventions per flow sheet       []  Discharge due to:_  []  Other:_      Geetha Acevedo PTA 5/15/2019  8:58 AM    Future Appointments   Date Time Provider Mayela Rahman   5/15/2019  9:00 AM Brisa Chawla PTA [facility]   5/20/2019 10:00 AM Brisa Chawla PTA [facility]   5/29/2019 10:00 AM Brisa Chawla PTA [facility]

## 2019-05-20 ENCOUNTER — HOSPITAL ENCOUNTER (OUTPATIENT)
Dept: PHYSICAL THERAPY | Age: 74
Discharge: HOME | End: 2019-05-20
Payer: MEDICARE

## 2019-05-20 PROCEDURE — 97110 THERAPEUTIC EXERCISES: CPT

## 2019-05-20 NOTE — PROGRESS NOTES
PT DAILY TREATMENT NOTE 10-18    Patient Name: Rubio Balderas  Date:2019  : 1945  [x]  Patient  Verified  Payor: Jay [employer]ers / Plan: [place] MEDICARE PART A & B / Product Type: Medicare /    In time:1000  Out time:1040  Total Treatment Time (min): 40  Visit #: 5 of 16    Medicare/BCBS Only   Total Timed Codes (min):  30 1:1 Treatment Time:  23       Treatment Area: Pain in left shoulder [M25.512]    SUBJECTIVE  Pain Level (0-10 scale): 1/10  Any medication changes, allergies to medications, adverse drug reactions, diagnosis change, or new procedure performed?: [x] No    [] Yes (see summary sheet for update)  Subjective functional status/changes:   [] No changes reported  Pt stated that she has very little pain today    OBJECTIVE    Modality rationale: decrease pain and increase tissue extensibility to improve the patients ability to increase ease with ADLs   Min Type Additional Details    [] Estim:  []Unatt       []IFC  []Premod                        []Other:  []w/ice   []w/heat  Position:  Location:    [] Estim: []Att    []TENS instruct  []NMES                    []Other:  []w/US   []w/ice   []w/heat  Position:  Location:    []  Traction: [] Cervical       []Lumbar                       [] Prone          []Supine                       []Intermittent   []Continuous Lbs:  [] before manual  [] after manual    []  Ultrasound: []Continuous   [] Pulsed                           []1MHz   []3MHz W/cm2:  Location:    []  Iontophoresis with dexamethasone         Location: [] Take home patch   [] In clinic   10 []  Ice     [x]  heat  []  Ice massage  []  Laser   []  Anodyne Position: seated  Location:left sh    []  Laser with stim  []  Other:  Position:  Location:    []  Vasopneumatic Device Pressure:       [] lo [] med [] hi   Temperature: [] lo [] med [] hi   [x] Skin assessment post-treatment:  [x]intact []redness- no adverse reaction    []redness - adverse reaction:     30 min Therapeutic Exercise:  [x] See flow sheet :   Rationale: increase ROM and increase strength to improve the patients ability to increase ease with ADLs    With   [x] TE   [] TA   [] neuro   [] other: Patient Education: [x] Review HEP    [] Progressed/Changed HEP based on:   [] positioning   [] body mechanics   [] transfers   [] heat/ice application    [] other:      Other Objective/Functional Measures:   Had no difficulty with exercises  No complaint of increased pain during session  Pt requested to have [address] after exercises     Pain Level (0-10 scale) post treatment: 0/10    ASSESSMENT/Changes in Function:   Pt is progressing well toward goals. Strength and range of motion in left sh cont to improve. Pt reported increased ease with performing ADLs    Patient will continue to benefit from skilled PT services to modify and progress therapeutic interventions, address functional mobility deficits, address ROM deficits and address strength deficits to attain remaining goals. [x]  See Plan of Care  []  See progress note/recertification  []  See Discharge Summary         Progress towards goals / Updated goals:  Short Term Goals: To be accomplished in 1 weeks:  1. Patient will be compliant with HEP to improve therapy outcomes. Goal met. 5/6/19  Long Term Goals: To be accomplished in 8 weeks:  1. Patient will increase FOTO score by 14 pts, 69/100, to show improved functional mobility and QOL. 2. Patient will report <2/10 pain when reaching over-head to improve ease of household chores.  Munden Brittani continues to fluctuate.  3/10 prior to therapy this visit 5/9/19  3. Patient will increase left shoulder strength to 4+/5 to improve ease of ADLs  4. Patient will report 50% improvement in symptoms to restore PLOF.    Goal met.  Pt reported 75% improvement in overall sx's. 5/20/19    PLAN  []  Upgrade activities as tolerated     [x]  Continue plan of care  []  Update interventions per flow sheet       []  Discharge due to:_  []  Other:Raysa Stephens Yael Hawkins PTA 5/20/2019  10:03 AM    Future Appointments   Date Time Provider Mayela Lacey   5/29/2019 10:00 AM Leobardo Omer PTA [facility]

## 2019-05-29 ENCOUNTER — HOSPITAL ENCOUNTER (OUTPATIENT)
Dept: PHYSICAL THERAPY | Age: 74
Discharge: HOME | End: 2019-05-29
Payer: MEDICARE

## 2019-05-29 PROCEDURE — 97110 THERAPEUTIC EXERCISES: CPT

## 2019-05-29 NOTE — PROGRESS NOTES
PT DAILY TREATMENT NOTE 10-18    Patient Name: Geraldo Navarrete  Date:2019  : 1945  [x]  Patient  Verified  Payor: VA MEDICARE / Plan: VA MEDICARE PART A & B / Product Type: Medicare /    In time:1000  Out time:1030  Total Treatment Time (min): 30  Visit #: 6 of 16    Medicare/BCBS Only   Total Timed Codes (min):  30 1:1 Treatment Time:  30       Treatment Area: Pain in left shoulder [M25.512]    SUBJECTIVE  Pain Level (0-10 scale): 0/10  Any medication changes, allergies to medications, adverse drug reactions, diagnosis change, or new procedure performed?: [x] No    [] Yes (see summary sheet for update)  Subjective functional status/changes:   [] No changes reported  Pt stated that her left sh is much better    OBJECTIVE    30 min Therapeutic Exercise:  [x] See flow sheet :   Rationale: increase ROM and increase strength to improve the patients ability to increase ease with ADLs    With   [x] TE   [] TA   [] neuro   [] other: Patient Education: [x] Review HEP    [] Progressed/Changed HEP based on:   [] positioning   [] body mechanics   [] transfers   [] heat/ice application    [] other:      Other Objective/Functional Measures:   Had no difficulty with exercises  Had slight increase in ant sh pain after SA punches  No other pain reported during session     Pain Level (0-10 scale) post treatment: 1/10    ASSESSMENT/Changes in Function:   Pt is progressing well toward goals. Strength cont to improve for all motions in left sh. Pt stated that her left sh is much better and she has less difficulty with performing ADLs    Patient will continue to benefit from skilled PT services to modify and progress therapeutic interventions, address functional mobility deficits, address ROM deficits and address strength deficits to attain remaining goals.      [x]  See Plan of Care  []  See progress note/recertification  []  See Discharge Summary         Progress towards goals / Updated goals:  Short Term Goals: To be accomplished in 1 weeks:  1. Patient will be compliant with HEP to improve therapy outcomes. Goal met. 5/6/19  Long Term Goals: To be accomplished in 8 weeks:  1. Patient will increase FOTO score by 14 pts, 69/100, to show improved functional mobility and QOL. 2. Patient will report <2/10 pain when reaching over-head to improve ease of household chores. Progressing. Pain level was 0/10 today. 5/29/19  3. Patient will increase left shoulder strength to 4+/5 to improve ease of ADLs  4. Patient will report 50% improvement in symptoms to restore PLOF.    Goal met. Pt reported 75% improvement in overall sx's. 5/20/19    PLAN  []  Upgrade activities as tolerated     [x]  Continue plan of care  []  Update interventions per flow sheet       []  Discharge due to:_  []  Other:_      Micky Larson, DALIA 5/29/2019  10:00 AM    No future appointments.

## 2019-06-05 ENCOUNTER — HOSPITAL ENCOUNTER (OUTPATIENT)
Dept: PHYSICAL THERAPY | Age: 74
Discharge: HOME | End: 2019-06-05
Payer: MEDICARE

## 2019-06-05 PROCEDURE — 97110 THERAPEUTIC EXERCISES: CPT

## 2019-06-05 NOTE — PROGRESS NOTES
PT DAILY TREATMENT NOTE 10-18    Patient Name: Cathi Diamond  Date:2019  : 1945  [x]  Patient  Verified  Payor: VA MEDICARE / Plan: VA MEDICARE PART A & B / Product Type: Medicare /    In time:130  Out time:125  Total Treatment Time (min): 25  Visit #: 7 of 16    Medicare/BCBS Only   Total Timed Codes (min):  25 1:1 Treatment Time:  25       Treatment Area: Pain in left shoulder [M25.512]    SUBJECTIVE  Pain Level (0-10 scale): 0/10  Any medication changes, allergies to medications, adverse drug reactions, diagnosis change, or new procedure performed?: [x] No    [] Yes (see summary sheet for update)  Subjective functional status/changes:   [] No changes reported  Pt stated that she wants to make this her last day    OBJECTIVE    25 min Therapeutic Exercise:  [x] See flow sheet :   Rationale: increase ROM and increase strength to improve the patients ability to increase ease with ADLs    With   [x] TE   [] TA   [] neuro   [] other: Patient Education: [x] Review HEP    [] Progressed/Changed HEP based on:   [] positioning   [] body mechanics   [] transfers   [] heat/ice application    [] other:      Other Objective/Functional Measures:   No difficulty with exercises  No complaint of pain during session     Pain Level (0-10 scale) post treatment: 0/10    ASSESSMENT/Changes in Function:   []  See Plan of Care  []  See progress note/recertification  [x]  See Discharge Summary         Progress towards goals / Updated goals:  Short Term Goals: To be accomplished in 1 weeks:  1. Patient will be compliant with HEP to improve therapy outcomes. Goal met. 19  Long Term Goals: To be accomplished in 8 weeks:  1. Patient will increase FOTO score by 14 pts, 69/100, to show improved functional mobility and QOL. Goal met. Increased from 55 to 78. 19  2. Patient will report <2/10 pain when reaching over-head to improve ease of household chores. Goal met. Pt reports that she no longer has pain. 19  3. Patient will increase left shoulder strength to 4+/5 to improve ease of ADLs  Progressing. Left sh strength is 4/5 grossly. 6/5/19  4. Patient will report 50% improvement in symptoms to restore PLOF.    Goal met. Pt reported 85% improvement in overall sx's.  6/5/19    PLAN  []  Upgrade activities as tolerated     []  Continue plan of care  []  Update interventions per flow sheet       [x]  Discharge due to:pt requested as she has no pain  []  Other:_      Larissa Shannon PTA 6/5/2019  1:35 PM    Future Appointments   Date Time Provider Mayela Rahman   6/10/2019  8:00 AM Geofm Me, PTA [facility]   6/12/2019 11:00 AM Martinez Coughlin PT [facility]   6/17/2019  9:00 AM Gonzales Dempsey [facility]   6/19/2019 10:00 AM Geofm Me, PTA [facility]   6/24/2019  8:30 AM Geofm Me, PTA [facility]   6/26/2019  9:30 AM Geofm Me, PTA [facility]

## 2019-06-10 ENCOUNTER — APPOINTMENT (OUTPATIENT)
Dept: PHYSICAL THERAPY | Age: 74
End: 2019-06-10
Payer: MEDICARE

## 2019-06-12 ENCOUNTER — APPOINTMENT (OUTPATIENT)
Dept: PHYSICAL THERAPY | Age: 74
End: 2019-06-12
Payer: MEDICARE

## 2019-06-17 ENCOUNTER — APPOINTMENT (OUTPATIENT)
Dept: PHYSICAL THERAPY | Age: 74
End: 2019-06-17
Payer: MEDICARE

## 2019-06-19 ENCOUNTER — APPOINTMENT (OUTPATIENT)
Dept: PHYSICAL THERAPY | Age: 74
End: 2019-06-19
Payer: MEDICARE

## 2019-06-24 ENCOUNTER — APPOINTMENT (OUTPATIENT)
Dept: PHYSICAL THERAPY | Age: 74
End: 2019-06-24
Payer: MEDICARE

## 2019-06-26 ENCOUNTER — APPOINTMENT (OUTPATIENT)
Dept: PHYSICAL THERAPY | Age: 74
End: 2019-06-26
Payer: MEDICARE

## 2019-07-31 NOTE — PROGRESS NOTES
[facility] - Patrick Saldana  [address] [facility]  [phone] fax    Physical Therapy Discharge Summary    Patient name: Rosario Jose Start of Care: 19   Referral source: Iliana Knapp NP : 1945   Medical/Treatment Diagnosis: Pain in left shoulder [M25.512]  Payor: VA MEDICARE / Plan: VA MEDICARE PART A & B / Product Type: Medicare /  Onset Date:2 months ago     Prior Hospitalization: see medical history Provider#: [id number]   Medications: Verified on Patient Summary List    Comorbidities: partial hysterectomy    Prior Level of Function: functionally ind, lives with spouse retired                       Visits from [facility]: 7    Missed Visits: 0    Reporting Period : 19 to 19    Summary of Care:  Goal: Patient will be compliant with HEP to improve therapy outcomes. Status at last note/certification: Goal met. Status at discharge: met    Goal: Patient will increase FOTO score by 14 pts, 69/100, to show improved functional mobility and QOL. Status at last note/certification: Goal met. Improved 23 points to 78/100  Status at discharge: met    Goal: Patient will report <2/10 pain when reaching over-head to improve ease of household chores. Status at last note/certification: Goal met. Pt reports that she no longer has pain  Status at discharge: met    Goal: Patient will increase left shoulder strength to 4+/5 to improve ease of ADLs  Status at last note/certification: Progressing. Left sh strength is 4/5 grossly  Status at discharge: not met    Goal: Patient will report 50% improvement in symptoms to restore PLOF.   Status at last note/certification: Goal met. Pt reported 85% improvement in overall sx's.    Status at discharge: met    ASSESSMENT/RECOMMENDATIONS:  Pt has made steady progress in therapy, meeting 4/5 initial goals and progressing towards strength goal.  Pt is no longer experiencing pain and reports 85% overall improvement since start of care. Significant improvement in FOTO score is indicative of functional improvement. Pt is compliant with HEP to address remaining deficits and is DC at this time as skilled intervention is no longer required.       [x]Discontinue therapy: [x]Patient has reached or is progressing toward set goals      []Patient is non-compliant or has abdicated      []Due to lack of appreciable progress towards set goals    Wellington Rudd, PT 7/31/2019 2:20 PM

## 2019-10-18 ENCOUNTER — OFFICE VISIT (OUTPATIENT)
Dept: FAMILY MEDICINE CLINIC | Age: 74
End: 2019-10-18

## 2019-10-18 VITALS
RESPIRATION RATE: 18 BRPM | SYSTOLIC BLOOD PRESSURE: 110 MMHG | HEIGHT: 64 IN | HEART RATE: 62 BPM | BODY MASS INDEX: 36.88 KG/M2 | TEMPERATURE: 98 F | DIASTOLIC BLOOD PRESSURE: 71 MMHG | WEIGHT: 216 LBS

## 2019-10-18 DIAGNOSIS — Z71.89 ACP (ADVANCE CARE PLANNING): ICD-10-CM

## 2019-10-18 DIAGNOSIS — Z12.31 ENCOUNTER FOR SCREENING MAMMOGRAM FOR MALIGNANT NEOPLASM OF BREAST: ICD-10-CM

## 2019-10-18 DIAGNOSIS — M79.89 FOOT SWELLING: ICD-10-CM

## 2019-10-18 DIAGNOSIS — Z00.00 MEDICARE ANNUAL WELLNESS VISIT, SUBSEQUENT: Primary | ICD-10-CM

## 2019-10-18 DIAGNOSIS — S83.92XD SPRAIN OF LEFT KNEE, UNSPECIFIED LIGAMENT, SUBSEQUENT ENCOUNTER: ICD-10-CM

## 2019-10-18 RX ORDER — NAPROXEN 500 MG/1
500 TABLET ORAL
Qty: 60 TAB | Refills: 12 | Status: SHIPPED | OUTPATIENT
Start: 2019-10-18 | End: 2021-05-21 | Stop reason: ALTCHOICE

## 2019-10-18 NOTE — PROGRESS NOTES
Ekta Saleem presents today for   Chief Complaint   Patient presents with    Annual Wellness Visit    Knee Pain     Patient stated that once \"in a while\" her left knee give her pain.  Foot Swelling     Patient stated that she been having \"on-off\" bilateral feet swelling when sitting to long. Is someone accompanying this pt? no    Is the patient using any DME equipment during [address]? no    Depression Screening:  3 most recent PHQ Screens 3/26/2019   Little interest or pleasure in doing things Not at all   Feeling down, depressed, irritable, or hopeless Not at all   Total Score PHQ 2 0       Learning Assessment:  Learning Assessment 10/18/2019   PRIMARY LEARNER Patient   HIGHEST LEVEL OF EDUCATION - PRIMARY LEARNER  SOME COLLEGE   BARRIERS PRIMARY LEARNER NONE   CO-LEARNER CAREGIVER No   PRIMARY LANGUAGE ENGLISH   LEARNER PREFERENCE PRIMARY LISTENING   ANSWERED BY self   RELATIONSHIP SELF       Abuse Screening:  Abuse Screening Questionnaire 3/26/2019   Do you ever feel afraid of your partner? N   Are you in a relationship with someone who physically or mentally threatens you? N   Is it safe for you to go home? Y       Fall Screening  Fall Risk Assessment, last 12 mths 3/26/2019   Able to walk? Yes   Fall in past 12 months? No       Generalized Anxiety  No flowsheet data found. Health Maintenance Due   Topic Date Due    Shingrix Vaccine Age 49> (1 of 2) 10/24/1996    Influenza Age 5 to Adult  08/01/2019    MEDICARE YEARLY EXAM  08/16/2019   . Health Maintenance reviewed and discussed and ordered per Provider. Ekta Saleem is updated on all     Coordination of Care  1. Have you been to the ER, urgent care clinic since your last visit? Hospitalized since your last visit? no    2. Have you seen or consulted any other health care providers outside of the [address] [place] [address] since your last visit? Include any pap smears or colon screening. no      Advance Directive:  1.  Do you have an advance directive in place? Patient Reply:no    2. If not, would you like material regarding how to put one in place?  Patient Reply: no

## 2019-10-18 NOTE — PROGRESS NOTES
Advance Care Planning (ACP) Provider Note - Comprehensive     Date of ACP Conversation: 10/18/19  Persons included in Conversation:  patient  Length of ACP Conversation in minutes:  16 minutes    Authorized Decision Maker (if patient is incapable of making informed decisions): This person is: Other Legally Authorized Decision Maker (e.g. Next of Kin)            General ACP for ALL Patients with Decision Making Capacity:   Importance of advance care planning, including choosing a healthcare agent to communicate patient's healthcare decisions if patient lost the ability to make decisions, such as after a sudden illness or accident  Understanding of the healthcare agent role was assessed and information provided  Exploration of values, goals, and preferences if recovery is not expected, even with continued medical treatment in the event of: Imminent death  Severe, permanent brain injury  \"In these circumstances, what matters most to you? \"  Care focused more on quantity (length) of life.     Review of Existing Advance Directive:  pt does not yet have an AD    For Serious or Chronic Illness:  Understanding of medical condition      Interventions Provided:  Recommended completion of Advance Directive form after review of ACP materials and conversation with prospective healthcare agent

## 2019-10-18 NOTE — PROGRESS NOTES
This is the Subsequent Medicare Annual Wellness Exam, performed 12 months or more after the Initial AWV or the last Subsequent AWV    I have reviewed the patient's medical history in detail and updated the computerized patient record. 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 EF 65%. No RWMA. LA size was upper limits of normal (decreased in size since prev study of 9/23/16). Mild MR.      Colon polyps     Hemorrhoids       Past Surgical History:   Procedure Laterality Date    BREAST SURGERY PROCEDURE UNLISTED      biopsy, uncertain of which side    COLONOSCOPY N/A 5/19/2016    COLON[facility]pectomy [facility]    COLONOSCOPY N/A 2/13/2018    COLONOSCOPY / polypectomy performed [facility] at [facility] ENDOSCOPY    HX HYSTERECTOMY       Current Outpatient Medications   Medication Sig Dispense Refill    naproxen (NAPROSYN) 500 mg tablet Take 1 Tab by mouth two (2) times daily (with meals). 60 Tab 1    cholecalciferol (VITAMIN D3) 1,000 unit cap Take 1,000 Units by mouth every other day.  raloxifene (EVISTA) 60 mg tablet 60 mg daily. 0    methocarbamol (ROBAXIN) 500 mg tablet Take 1 Tab by mouth nightly as needed. 20 Tab 0    aspirin delayed-release (ADULT LOW DOSE ASPIRIN) 81 mg tablet Take 1 Tab by mouth daily.  30 Tab prn     No Known Allergies  Family History   Problem Relation Age of Onset    Hypertension Mother     Prostate Cancer Father      Social History     Tobacco Use    Smoking status: Former Smoker     Years: 13.00    Smokeless tobacco: Never Used    Tobacco comment: occasional, stopped at age 37   Substance Use Topics    Alcohol use: No     Patient Active Problem List   Diagnosis Code    AGE (acute gastroenteritis) K52.9    Colon polyp, colonoscopy, 1/2011 K63.5    Elevated blood pressure R03.0    Breast pain, left N64.4    Abnormal EKG R94.31    ACP (advance care planning) Z71.89    Osteoarthritis of lumbar spine M47.816    Tubular adenoma of colon, colonscopy 5/2016 D12.6    Abnormal echocardiogram R93.1    Hypertension I10    Dyslipidemia E78.5    Severe obesity (BMI 35.0-39. 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 Functional Ability and Level of Safety:   Hearing Loss  Hearing is good. Activities of Daily Living  The home contains: no safety equipment. Patient does total self care    Fall Risk  Fall Risk Assessment, last 12 mths 3/26/2019   Able to walk? Yes   Fall in past 12 months? No       Abuse Screen  Patient is not abused    Cognitive Screening   Evaluation of Cognitive Function:  Has your family/caregiver stated any concerns about your memory: no  Normal    Patient Care Team   Patient Care Team:  Kameron Garrison MD as PCP - General (Family Practice)  Anahy Rodriguez MD as Physician (Urology)  Az Iglesias DO (Cardiology)  Bennie Ramirez MD (Internal Medicine)    Assessment/Plan   Education and counseling provided:  Are appropriate based on today's review and evaluation  End-of-Life planning (with patient's consent)  Influenza Vaccine  Screening Mammography  shingles vaccination    Diagnoses and all orders for this visit:    1. Medicare annual wellness visit, subsequent    2. ACP (advance care planning)    3.  Encounter for screening mammogram for malignant neoplasm of breast  -     [facility] 3D LACEY W MAMMO BI SCREENING INCL CAD;  Future        Health Maintenance Due   Topic Date Due    Shingrix Vaccine Age 49> (1 of 2) 10/24/1996    Influenza Age 5 to Adult  08/01/2019    MEDICARE YEARLY EXAM  08/16/2019

## 2019-10-18 NOTE — PATIENT INSTRUCTIONS
Medicare Wellness Visit, Female     The best way to live healthy is to have a lifestyle where you eat a well-balanced diet, exercise regularly, limit alcohol use, and quit all forms of tobacco/nicotine, if applicable. Regular preventive services are another way to keep healthy. Preventive services (vaccines, screening tests, monitoring & exams) can help personalize your care plan, which helps you manage your own care. Screening tests can find health problems at the earliest stages, when they are easiest to treat. [facility] follows the current, evidence-based guidelines published by the [facility] ([facility]) when recommending preventive services for our patients. Because we follow these guidelines, sometimes recommendations change over time as research supports it. (For example, mammograms used to be recommended annually. Even though Medicare will still pay for an annual mammogram, the newer guidelines recommend a mammogram every two years for women of average risk.)  Of course, you and your doctor may decide to screen more often for some diseases, based on your risk and your health status. Preventive services for you include:  - Medicare offers their members a free annual wellness visit, which is time for you and your primary care provider to discuss and plan for your preventive service needs. Take advantage of this benefit every year!  -All adults over the age of 72 should receive the recommended pneumonia vaccines. Current USPSTF guidelines recommend a series of two vaccines for the best pneumonia protection.   -All adults should have a flu vaccine yearly and a tetanus vaccine every 10 years. All adults age 61 and older should receive a shingles vaccine once in their lifetime.    -A bone mass density test is recommended when a woman turns 65 to screen for osteoporosis. This test is only recommended one time, as a screening.  Some providers will use this same test as a disease monitoring tool if you already have osteoporosis. -All adults age 38-68 who are overweight should have a diabetes screening test once every three years.   -Other screening tests and preventive services for persons with diabetes include: an eye exam to screen for diabetic retinopathy, a kidney function test, a foot exam, and stricter control over your cholesterol.   -Cardiovascular screening for adults with routine risk involves an electrocardiogram (ECG) at intervals determined by your doctor.   -Colorectal cancer screenings should be done for adults age 54-65 with no increased risk factors for colorectal cancer. There are a number of acceptable methods of screening for this type of cancer. Each test has its own benefits and drawbacks. Discuss with your doctor what is most appropriate for you during your annual wellness visit. The different tests include: colonoscopy (considered the best screening method), a fecal occult blood test, a fecal DNA test, and sigmoidoscopy. -Breast cancer screenings are recommended every other year for women of normal risk, age 54-69.  -Cervical cancer screenings for women over age 72 are only recommended with certain risk factors.   -All adults born between [facility] should be screened once for Hepatitis C.      Here is a list of your current Health Maintenance items (your personalized list of preventive services) with a due date:  Health Maintenance Due   Topic Date Due    Shingles Vaccine (1 of 2) 10/24/1996    Flu Vaccine  08/01/2019    Annual Well Visit  08/16/2019

## 2019-10-18 NOTE — PROGRESS NOTES
Chief Complaint   Patient presents with    Annual Wellness Visit    Knee Pain     Patient stated that once \"in a while\" her left knee give her pain.  Foot Swelling     Patient stated that she been having \"on-off\" bilateral feet swelling when sitting to long. HISTORY OF PRESENT ILLNESS  Fatimah Rudolph is a 69 y.o. female. HPI  Pt here for AWV. Pt had labs on 4/29/19. Labs reviewed in detail with pt. Pt reports that her L knee is still occasionally bothersome. She has improvement of her pain with naprosyn. She would like a refill. Pt c/o intermittent B foot swelling that tends to occur if she does not elevate her feet while seated. Review of Systems   Constitutional: Negative. HENT: Negative. Respiratory: Negative. Cardiovascular:        B foot swelling   Musculoskeletal: Positive for joint pain. All other systems reviewed and are negative. Physical Exam  Physical Exam   Nursing note and vitals reviewed. Constitutional: She is oriented to person, place, and time. She appears well-developed and well-nourished. HENT:   Head: Normocephalic and atraumatic. Right Ear: External ear normal.   Left Ear: External ear normal.   Nose: Nose normal.   Eyes: Conjunctivae and EOM are normal.   Neck: Normal range of motion. Neck supple. No JVD present. Carotid bruit is not present. No thyromegaly present. Cardiovascular: Normal rate, regular rhythm, normal heart sounds and intact distal pulses. Exam reveals no gallop and no friction rub. No murmur heard. Pulmonary/Chest: Effort normal and breath sounds normal. She has no wheezes. She has no rhonchi. She has no rales. Abdominal: Soft. Bowel sounds are normal.   Musculoskeletal: Normal range of motion. Neurological: She is alert and oriented to person, place, and time. Coordination normal.   Skin: Skin is warm and dry. Psychiatric: She has a normal mood and affect.  Her behavior is normal. Judgment and thought content normal.     ASSESSMENT and PLAN  Diagnoses and all orders for this visit:    1. Sprain of left knee, unspecified ligament, subsequent encounter  -     naproxen (NAPROSYN) 500 mg tablet; Take 1 Tab by mouth two (2) times daily as needed for Pain. 2. Foot swelling  Pt reassured. Discussed minimizing sodium intake, use of compression hose, elevation of legs. Follow-up and Dispositions    · Return in about 1 year (around 10/18/2020) for medicare wellness visit.

## 2019-10-30 ENCOUNTER — OFFICE VISIT (OUTPATIENT)
Dept: FAMILY MEDICINE CLINIC | Age: 74
End: 2019-10-30

## 2019-10-30 VITALS
HEIGHT: 64 IN | DIASTOLIC BLOOD PRESSURE: 77 MMHG | RESPIRATION RATE: 18 BRPM | WEIGHT: 215.8 LBS | SYSTOLIC BLOOD PRESSURE: 140 MMHG | BODY MASS INDEX: 36.84 KG/M2 | TEMPERATURE: 98.1 F | HEART RATE: 75 BPM

## 2019-10-30 DIAGNOSIS — R51.9 NONINTRACTABLE EPISODIC HEADACHE, UNSPECIFIED HEADACHE TYPE: ICD-10-CM

## 2019-10-30 DIAGNOSIS — R68.89 COMPLAINING OF HEAD SYMPTOM: Primary | ICD-10-CM

## 2019-10-30 NOTE — PATIENT INSTRUCTIONS
High Blood Pressure: Care Instructions  Overview    It's normal for blood pressure to go up and down throughout the day. But if it stays up, you have high blood pressure. Another name for high blood pressure is hypertension. Despite what a lot of people think, high blood pressure usually doesn't cause headaches or make you feel dizzy or lightheaded. It usually has no symptoms. But it does increase your risk of stroke, heart attack, and other problems. You and your doctor will talk about your risks of these problems based on your blood pressure. Your doctor will give you a goal for your blood pressure. Your goal will be based on your health and your age. Lifestyle changes, such as eating healthy and being active, are always important to help lower blood pressure. You might also take medicine to reach your blood pressure goal.  Follow-up care is a key part of your treatment and safety. Be sure to make and go to all appointments, and call your doctor if you are having problems. It's also a good idea to know your test results and keep a list of the medicines you take. How can you care for yourself at home? Medical treatment  · If you stop taking your medicine, your blood pressure will go back up. You may take one or more types of medicine to lower your blood pressure. Be safe with medicines. Take your medicine exactly as prescribed. Call your doctor if you think you are having a problem with your medicine. · Talk to your doctor before you start taking aspirin every day. Aspirin can help certain people lower their risk of a heart attack or stroke. But taking aspirin isn't right for everyone, because it can cause serious bleeding. · See your doctor regularly. You may need to see the doctor more often at first or until your blood pressure comes down. · If you are taking blood pressure medicine, talk to your doctor before you take decongestants or anti-inflammatory medicine, such as ibuprofen.  Some of these medicines can raise blood pressure. · Learn how to check your blood pressure at home. Lifestyle changes  · Stay at a healthy weight. This is especially important if you put on weight around the waist. Losing even 10 pounds can help you lower your blood pressure. · If your doctor recommends it, get more exercise. Walking is a good choice. Bit by bit, increase the amount you walk every day. Try for at least 30 minutes on most days of the week. You also may want to swim, bike, or do other activities. · Avoid or limit alcohol. Talk to your doctor about whether you can drink any alcohol. · Try to limit how much sodium you eat to less than 2,300 milligrams (mg) a day. Your doctor may ask you to try to eat less than 1,500 mg a day. · Eat plenty of fruits (such as bananas and oranges), vegetables, legumes, whole grains, and low-fat dairy products. · Lower the amount of saturated fat in your diet. Saturated fat is found in animal products such as milk, cheese, and meat. Limiting these foods may help you lose weight and also lower your risk for heart disease. · Do not smoke. Smoking increases your risk for heart attack and stroke. If you need help quitting, talk to your doctor about stop-smoking programs and medicines. These can increase your chances of quitting for good. When should you call for help? Call  911 anytime you think you may need emergency care. This may mean having symptoms that suggest that your blood pressure is causing a serious heart or blood vessel problem. Your blood pressure may be over 180/120.   For example, call  911 if:    · You have symptoms of a heart attack. These may include:  ? Chest pain or pressure, or a strange feeling in the chest.  ? Sweating. ? Shortness of breath. ? Nausea or vomiting. ? Pain, pressure, or a strange feeling in the back, neck, jaw, or upper belly or in one or both shoulders or arms. ? Lightheadedness or sudden weakness.   ? A fast or irregular heartbeat.     · You have symptoms of a stroke. These may include:  ? Sudden numbness, tingling, weakness, or loss of movement in your face, arm, or leg, especially on only one side of your body. ? Sudden vision changes. ? Sudden trouble speaking. ? Sudden confusion or trouble understanding simple statements. ? Sudden problems with walking or balance. ? A sudden, severe headache that is different from past headaches.     · You have severe back or belly pain.    Do not wait until your blood pressure comes down on its own. Get help right away.   Call your doctor now or seek immediate care if:    · Your blood pressure is much higher than normal (such as 180/120 or higher), but you don't have symptoms.     · You think high blood pressure is causing symptoms, such as:  ? Severe headache.  ? Blurry vision.    Watch closely for changes in your health, and be sure to contact your doctor if:    · Your blood pressure measures higher than your doctor recommends at least 2 times. That means the top number is higher or the bottom number is higher, or both.     · You think you may be having side effects from your blood pressure medicine. Where can you learn more? Go to http://[place]-[facility].info/. Enter F918 in the search box to learn more about \"High Blood Pressure: Care Instructions. \"  Current as of: April 9, 2019  Content Version: 12.2  © 5260-2293 [employer]. Care instructions adapted under license by [employer] (which disclaims liability or warranty for this information). If you have questions about a medical condition or this instruction, always ask your healthcare professional. [place] [address] any warranty or liability for your use of this information.

## 2019-10-30 NOTE — PROGRESS NOTES
Danielle Munoz presents today for   Chief Complaint   Patient presents with    Headache     Patient stated that she been having head pressure behind her right ear that some and goes x 4 days. Is someone accompanying this pt? Yes     Is the patient using any DME equipment during [address]? no    Depression Screening:  3 most recent PHQ Screens 3/26/2019   Little interest or pleasure in doing things Not at all   Feeling down, depressed, irritable, or hopeless Not at all   Total Score PHQ 2 0       Learning Assessment:  Learning Assessment 10/18/2019   PRIMARY LEARNER Patient   HIGHEST LEVEL OF EDUCATION - PRIMARY LEARNER  SOME COLLEGE   BARRIERS PRIMARY LEARNER NONE   CO-LEARNER CAREGIVER No   PRIMARY LANGUAGE ENGLISH   LEARNER PREFERENCE PRIMARY LISTENING   ANSWERED BY self   RELATIONSHIP SELF       Abuse Screening:  Abuse Screening Questionnaire 3/26/2019   Do you ever feel afraid of your partner? N   Are you in a relationship with someone who physically or mentally threatens you? N   Is it safe for you to go home? Y       Fall Screening  Fall Risk Assessment, last 12 mths 3/26/2019   Able to walk? Yes   Fall in past 12 months? No       Generalized Anxiety  No flowsheet data found. Health Maintenance Due   Topic Date Due    Shingrix Vaccine Age 49> (1 of 2) 10/24/1996    Influenza Age 5 to Adult  08/01/2019   . Health Maintenance reviewed and discussed and ordered per Provider. Danielle Munoz is updated on all     Coordination of Care  1. Have you been to the ER, urgent care clinic since your last visit? Hospitalized since your last visit? no    2. Have you seen or consulted any other health care providers outside of the [address] [place] [address] since your last visit? Include any pap smears or colon screening. no      Advance Directive:  1. Do you have an advance directive in place? Patient Reply:no    2. If not, would you like material regarding how to put one in place?  Patient Reply: no

## 2019-10-30 NOTE — PROGRESS NOTES
Chief Complaint   Patient presents with    Headache     Patient stated that she been having head pressure behind her right ear that some and goes x 4 days. HISTORY OF PRESENT ILLNESS  Wil Robert is a 69 y.o. female. HPI  Pt reports that she has been having a pressure sensation at the back of her head, just behind the R ear. It is a light pressure; it is not painful. She notices it more when she is not occupied with other tasks. There is no burning sensation at all. She does not have any ear pain or neck pain. There is no associated ha. Review of Systems   Constitutional: Negative. HENT: Negative. Respiratory: Negative. Cardiovascular: Negative. Neurological: Positive for sensory change. All other systems reviewed and are negative. Physical Exam  Physical Exam   Nursing note and vitals reviewed. Constitutional: She is oriented to person, place, and time. She appears well-developed and well-nourished. HENT:   Head: Normocephalic and atraumatic. Right Ear: External ear normal.   Left Ear: External ear normal.   Nose: Nose normal.   Eyes: Conjunctivae and EOM are normal.   Neck: Normal range of motion. Neck supple. No JVD present. Carotid bruit is not present. No thyromegaly present. Cardiovascular: Normal rate, regular rhythm, normal heart sounds and intact distal pulses. Exam reveals no gallop and no friction rub. No murmur heard. Pulmonary/Chest: Effort normal and breath sounds normal. She has no wheezes. She has no rhonchi. She has no rales. Abdominal: Soft. Bowel sounds are normal.   Musculoskeletal: Normal range of motion. Neurological: She is alert and oriented to person, place, and time. Coordination normal.   Skin: Skin is warm and dry. no scalp lesions. Psychiatric: She has a normal mood and affect. Her behavior is normal. Judgment and thought content normal.     ASSESSMENT and PLAN  Diagnoses and all orders for this visit:    1.  Complaining of head symptom  2. Nonintractable episodic headache, unspecified headache type   Pt is concerned; would like to proceed with imaging study. -     CT HEAD WO CONT; Future  Advised that the diff dx does include early s/sx of shingles. f/u as indicated.

## 2019-11-13 ENCOUNTER — HOSPITAL ENCOUNTER (OUTPATIENT)
Dept: CT IMAGING | Age: 74
Discharge: HOME | End: 2019-11-13
Attending: FAMILY MEDICINE
Payer: MEDICARE

## 2019-11-13 DIAGNOSIS — R51.9 NONINTRACTABLE EPISODIC HEADACHE, UNSPECIFIED HEADACHE TYPE: ICD-10-CM

## 2019-11-13 DIAGNOSIS — R68.89 COMPLAINING OF HEAD SYMPTOM: ICD-10-CM

## 2019-11-13 PROCEDURE — 70450 CT HEAD/BRAIN W/O DYE: CPT

## 2019-11-20 NOTE — PROGRESS NOTES
Called patient and chart review was done. Patient was advised per- Dr. Kaylene rBavo that her head CT was normal. Patient stated that she understand.

## 2019-12-02 DIAGNOSIS — Z12.31 ENCOUNTER FOR SCREENING MAMMOGRAM FOR MALIGNANT NEOPLASM OF BREAST: ICD-10-CM

## 2019-12-18 ENCOUNTER — HOSPITAL ENCOUNTER (OUTPATIENT)
Dept: VASCULAR SURGERY | Age: 74
Discharge: HOME | End: 2019-12-18
Attending: NURSE PRACTITIONER
Payer: MEDICARE

## 2019-12-18 ENCOUNTER — OFFICE VISIT (OUTPATIENT)
Dept: FAMILY MEDICINE CLINIC | Age: 74
End: 2019-12-18

## 2019-12-18 VITALS
TEMPERATURE: 98.9 F | DIASTOLIC BLOOD PRESSURE: 88 MMHG | WEIGHT: 215 LBS | RESPIRATION RATE: 18 BRPM | OXYGEN SATURATION: 98 % | BODY MASS INDEX: 36.7 KG/M2 | HEART RATE: 61 BPM | SYSTOLIC BLOOD PRESSURE: 132 MMHG | HEIGHT: 64 IN

## 2019-12-18 DIAGNOSIS — M79.604 ACUTE LEG PAIN, RIGHT: Primary | ICD-10-CM

## 2019-12-18 DIAGNOSIS — M79.604 ACUTE LEG PAIN, RIGHT: ICD-10-CM

## 2019-12-18 PROCEDURE — 93971 EXTREMITY STUDY: CPT

## 2019-12-18 NOTE — PROGRESS NOTES
HPI  Pt of Dr Monica Seo. Noticed pain in right posterior leg during the week of Thanksgiving. Was starting to feel better but then Sunday it started hurting again. Denies falls. Says that maybe it is a little puffy. Says it is uncomfortable when sitting but more painful when she tries to stand. Difficulty taking a step for a few minutes. Also notices pain when she is lying in bed. Says that it is a 9 or 10     Has tried Naprosyn or Alleve for pain and it is slightly helpful    Past Medical History  Past Medical History:   Diagnosis Date    Breast mass     right,  excisional biopsy 1/15,  atypical ductal hyperplasia ([facility] 1/15)    Cardiac echocardiogram 12/02/2016    Ltd study to evaluate LA size. EF 65%. No RWMA. LA size was upper limits of normal (decreased in size since prev study of 9/23/16). Mild MR.      Colon polyps     Hemorrhoids        Surgical History  Past Surgical History:   Procedure Laterality Date    BREAST SURGERY PROCEDURE UNLISTED      biopsy, uncertain of which side    COLONOSCOPY N/A 5/19/2016    COLONOSCOPY with polypectomy performed by Beatriz Sorto MD at [facility] ENDOSCOPY    COLONOSCOPY N/A 2/13/2018    COLONOSCOPY / polypectomy performed by Zoraida Carrel, MD at [facility] ENDOSCOPY    HX HYSTERECTOMY          Medications  Current Outpatient Medications   Medication Sig Dispense Refill    naproxen (NAPROSYN) 500 mg tablet Take 1 Tab by mouth two (2) times daily as needed for Pain. 60 Tab 12    cholecalciferol (VITAMIN D3) 1,000 unit cap Take 1,000 Units by mouth every other day.  raloxifene (EVISTA) 60 mg tablet 60 mg daily. 0    methocarbamol (ROBAXIN) 500 mg tablet Take 1 Tab by mouth nightly as needed. 20 Tab 0    aspirin delayed-release (ADULT LOW DOSE ASPIRIN) 81 mg tablet Take 1 Tab by mouth daily.  30 Tab prn       Allergies  No Known Allergies    Family History  Family History   Problem Relation Age of Onset    Hypertension Mother     Prostate Cancer Father Social History  Social History     Socioeconomic History    Marital status:      Spouse name: Not on file    Number of children: Not on file    Years of education: Not on file    Highest education level: Not on file   Occupational History    Not on file   Social Needs    Financial resource strain: Not on file    Food insecurity:     Worry: Not on file     Inability: Not on file    Transportation needs:     Medical: Not on file     Non-medical: Not on file   Tobacco Use    Smoking status: Former Smoker     Years: 13.00    Smokeless tobacco: Never Used    Tobacco comment: occasional, stopped at age 37   Substance and Sexual Activity    Alcohol use: No    Drug use: No    Sexual activity: Yes     Partners: Male   Lifestyle    Physical activity:     Days per week: Not on file     Minutes per session: Not on file    Stress: Not on file   Relationships    Social connections:     Talks on phone: Not on file     Gets together: Not on file     Attends Bahai service: Not on file     Active member of club or organization: Not on file     Attends meetings of clubs or organizations: Not on file     Relationship status: Not on file    Intimate partner violence:     Fear of current or ex partner: Not on file     Emotionally abused: Not on file     Physically abused: Not on file     Forced sexual activity: Not on file   Other Topics Concern    Not on file   Social History Narrative    Not on file       Problem List  Patient Active Problem List   Diagnosis Code    AGE (acute gastroenteritis) K52.9    Colon polyp, colonoscopy, 1/2011 K63.5    Elevated blood pressure R03.0    Breast pain, left N64.4    Abnormal EKG R94.31    ACP (advance care planning) Z71.89    Osteoarthritis of lumbar spine M47.816    Tubular adenoma of colon, colonscopy 5/2016 D12.6    Abnormal echocardiogram R93.1    Hypertension I10    Dyslipidemia E78.5    Severe obesity (BMI 35.0-39. 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 Cardiovascular: Negative. Musculoskeletal:        Right leg pain       Vital Signs  Vitals:    12/18/19 1149 12/18/19 1151   BP: (!) 142/100 132/88   Pulse: 61    Resp: 18    Temp: 98.9 °F (37.2 °C)    TempSrc: Oral    SpO2: 98%    Weight: 215 lb (97.5 kg)    Height: 5' 4\" (1.626 m)    PainSc:   9    PainLoc: Leg        Physical Exam  Physical Exam  Vitals signs and nursing note reviewed. Constitutional:       Appearance: Normal appearance. Cardiovascular:      Rate and Rhythm: Normal rate and regular rhythm. Heart sounds: Normal heart sounds. Pulmonary:      Breath sounds: Normal breath sounds. Musculoskeletal:         General: Tenderness present. Comments: Pt having a great deal of difficulty standing and taking steps because of pain. Tenderness upon palpation of right posterior knee and posterior thigh. Difficult to assess if leg is swollen or red because pt was having difficulty removing pants. Able to move foot and ankle without difficulty. Peripheral pulses palpable, foot is warm and dry   Skin:     General: Skin is warm and dry. Neurological:      Mental Status: She is alert and oriented to person, place, and time. Psychiatric:         Mood and Affect: Mood normal.         Behavior: Behavior normal.         Diagnostics  No orders of the defined types were placed in this encounter.       Results  Results for orders placed or performed during the hospital encounter of 51/54/75   METABOLIC PANEL, COMPREHENSIVE   Result Value Ref Range    Sodium 143 136 - 145 mmol/L    Potassium 3.8 3.5 - 5.5 mmol/L    Chloride 110 (H) 100 - 108 mmol/L    CO2 28 21 - 32 mmol/L    Anion gap 5 3.0 - 18 mmol/L    Glucose 83 74 - 99 mg/dL    BUN 12 7.0 - 18 MG/DL    Creatinine 0. 72 0.6 - 1.3 MG/DL    BUN/Creatinine ratio 17 12 - 20      GFR est AA >60 >60 ml/min/1.73m2    GFR est non-AA >60 >60 ml/min/1.73m2    Calcium 8.1 (L) 8.5 - 10.1 MG/DL    Bilirubin, total 0.5 0.2 - 1.0 MG/DL    ALT (SGPT) 16 13 - 56 U/L    AST (SGOT) 17 15 - 37 U/L    Alk. phosphatase 82 45 - 117 U/L    Protein, total 6.5 6.4 - 8.2 g/dL    Albumin 3.3 (L) 3.4 - 5.0 g/dL    Globulin 3.2 2.0 - 4.0 g/dL    A-G Ratio 1.0 0.8 - 1.7     LIPID PANEL   Result Value Ref Range    LIPID PROFILE          Cholesterol, total 163 <200 MG/DL    Triglyceride 69 <150 MG/DL    HDL Cholesterol 81 (H) 40 - 60 MG/DL    LDL, calculated 68.2 0 - 100 MG/DL    VLDL, calculated 13.8 MG/DL    CHOL/HDL Ratio 2.0 0 - 5.0       Assessment and Plan  Diagnoses and all orders for this visit:    1. Acute leg pain, right  -     DUPLEX LOWER EXT VENOUS RIGHT; Future  Discussed with pt and her  that given amount of pain and difficulty ambulating, will send her for ultrasound to rule out DVT. She was agreeable to this. Has appointment scheduled for this afternoon. Will follow up with pt ASAP with results. Discussed possible referral to orthopedics if ultrasound is normal      After care summary printed and reviewed with patient. Plan reviewed with patient. Questions answered. Patient verbalized understanding of plan and is in agreement with plan. Encouraged the use of my chart.     RIMA Olson-C

## 2019-12-18 NOTE — PROGRESS NOTES
Jnoo Shannon presents today for   Chief Complaint   Patient presents with    Leg Pain     c/o pain to right thigh ( back of thigh) and upper portion of calf. Initially 3 weeks ago and again 3 days ago. Jono Shannon preferred language for health care discussion is english/other. Is someone accompanying this pt? Yes, Stephane Solo ( )    Is the patient using any DME equipment during OV? Yes cane noted. Depression Screening:  3 most recent PHQ Screens 3/26/2019   Little interest or pleasure in doing things Not at all   Feeling down, depressed, irritable, or hopeless Not at all   Total Score PHQ 2 0       Learning Assessment:  Learning Assessment 10/18/2019   PRIMARY LEARNER Patient   HIGHEST LEVEL OF EDUCATION - PRIMARY LEARNER  SOME COLLEGE   BARRIERS PRIMARY LEARNER NONE   CO-LEARNER CAREGIVER No   PRIMARY LANGUAGE ENGLISH   LEARNER PREFERENCE PRIMARY LISTENING   ANSWERED BY self   RELATIONSHIP SELF       Abuse Screening:  Abuse Screening Questionnaire 3/26/2019   Do you ever feel afraid of your partner? N   Are you in a relationship with someone who physically or mentally threatens you? N   Is it safe for you to go home? Y       Fall Risk  Fall Risk Assessment, last 12 mths 3/26/2019   Able to walk? Yes   Fall in past 12 months? No         Coordination of Care:  1. Have you been to the ER, urgent care clinic since your last visit? Hospitalized since your last visit? No    2. Have you seen or consulted any other health care providers outside of the [address] [place] [address] since your last visit? Include any pap smears or colon screening. No      Advance Directive:  1. Do you have an advance directive in place? Patient Reply:No    2. If not, would you like material regarding how to put one in place?  Patient Reply: No

## 2019-12-19 ENCOUNTER — TELEPHONE (OUTPATIENT)
Dept: FAMILY MEDICINE CLINIC | Age: 74
End: 2019-12-19

## 2019-12-19 DIAGNOSIS — M79.604 ACUTE LEG PAIN, RIGHT: Primary | ICD-10-CM

## 2019-12-20 ENCOUNTER — TELEPHONE (OUTPATIENT)
Dept: FAMILY MEDICINE CLINIC | Age: 74
End: 2019-12-20

## 2019-12-31 ENCOUNTER — OFFICE VISIT (OUTPATIENT)
Dept: ORTHOPEDIC SURGERY | Age: 74
End: 2019-12-31

## 2019-12-31 VITALS
WEIGHT: 218.2 LBS | HEIGHT: 64 IN | SYSTOLIC BLOOD PRESSURE: 147 MMHG | OXYGEN SATURATION: 98 % | RESPIRATION RATE: 16 BRPM | HEART RATE: 63 BPM | BODY MASS INDEX: 37.25 KG/M2 | DIASTOLIC BLOOD PRESSURE: 81 MMHG | TEMPERATURE: 96.8 F

## 2019-12-31 DIAGNOSIS — M25.561 RIGHT KNEE PAIN, UNSPECIFIED CHRONICITY: ICD-10-CM

## 2019-12-31 DIAGNOSIS — M17.11 PRIMARY OSTEOARTHRITIS OF RIGHT KNEE: Primary | ICD-10-CM

## 2019-12-31 RX ORDER — TRIAMCINOLONE ACETONIDE 40 MG/ML
40 INJECTION, SUSPENSION INTRA-ARTICULAR; INTRAMUSCULAR ONCE
Qty: 1 ML | Refills: 0
Start: 2019-12-31 | End: 2019-12-31

## 2019-12-31 NOTE — PROGRESS NOTES
Marisol Wilson  1945   Chief Complaint   Patient presents with    Knee Pain     Right knee pain     Leg Pain     Right leg pain         HISTORY OF PRESENT ILLNESS  Marisol Wilson is a 69 y.o. female who presents today for evaluation of right knee and leg pain. She rates her pain 8/10 today. Pain has been present since the week of Thanksgiving. Has had intermittent episodes of pain since then. She states the pain starts at the back of the leg. Pain with standing up from a seated position. Patient describes the pain as aching that is Constant in nature. Symptoms are worse with stairs,walking, standing, squatting, Activity and is better with  Rest, Elevation. Associated symptoms include Swelling. Since problem started, it: is unchanged. Pain does not wake patient up at night. Has taken no meds for the problem. Has tried following treatments: Injections:NO; Brace:NO; Therapy:NO; Cane/Crutch:YES       No Known Allergies     Past Medical History:   Diagnosis Date    Breast mass     right,  excisional biopsy 1/15,  atypical ductal hyperplasia ([facility] 1/15)    Cardiac echocardiogram 12/02/2016    Ltd study to evaluate LA size. EF 65%. No RWMA. LA size was upper limits of normal (decreased in size since prev study of 9/23/16).   Mild MR.      Colon polyps     Hemorrhoids       Social History     Socioeconomic History    Marital status:      Spouse name: Not on file    Number of children: Not on file    Years of education: Not on file    Highest education level: Not on file   Occupational History    Not on file   Social Needs    Financial resource strain: Not on file    Food insecurity:     Worry: Not on file     Inability: Not on file    Transportation needs:     Medical: Not on file     Non-medical: Not on file   Tobacco Use    Smoking status: Former Smoker     Years: 13.00    Smokeless tobacco: Never Used    Tobacco comment: occasional, stopped at age 37   Substance and Sexual Activity  Alcohol use: No    Drug use: No    Sexual activity: Yes     Partners: Male   Lifestyle    Physical activity:     Days per week: Not on file     Minutes per session: Not on file    Stress: Not on file   Relationships    Social connections:     Talks on phone: Not on file     Gets together: Not on file     Attends Gnosticist service: Not on file     Active member of club or organization: Not on file     Attends meetings of clubs or organizations: Not on file     Relationship status: Not on file    Intimate partner violence:     Fear of current or ex partner: Not on file     Emotionally abused: Not on file     Physically abused: Not on file     Forced sexual activity: Not on file   Other Topics Concern    Not on file   Social History Narrative    Not on file      Past Surgical History:   Procedure Laterality Date    BREAST SURGERY PROCEDURE UNLISTED      biopsy, uncertain of which side    COLONOSCOPY N/A 5/19/2016    COLONOSCOPY with polypectomy performed by Marcos Navarrete MD at [facility] ENDOSCOPY    COLONOSCOPY N/A 2/13/2018    COLONOSCOPY / polypectomy performed by Gilles Weston MD at [facility] ENDOSCOPY    HX HYSTERECTOMY        Family History   Problem Relation Age of Onset    Hypertension Mother     Prostate Cancer Father       Current Outpatient Medications   Medication Sig    naproxen (NAPROSYN) 500 mg tablet Take 1 Tab by mouth two (2) times daily as needed for Pain.  methocarbamol (ROBAXIN) 500 mg tablet Take 1 Tab by mouth nightly as needed.  aspirin delayed-release (ADULT LOW DOSE ASPIRIN) 81 mg tablet Take 1 Tab by mouth daily.  cholecalciferol (VITAMIN D3) 1,000 unit cap Take 1,000 Units by mouth every other day.  raloxifene (EVISTA) 60 mg tablet 60 mg daily. No current facility-administered medications for this visit.         REVIEW OF SYSTEM   Patient denies: Weight loss, Fever/Chills, HA, Visual changes, Fatigue, Chest pain, SOB, Abdominal pain, N/V/D/C, Blood in stool or urine, Edema. Pertinent positive as above in HPI. All others were negative    PHYSICAL EXAM:   Visit Vitals  /81   Pulse 63   Temp 96.8 °F (36 °C) (Oral)   Resp 16   Ht 5' 4\" (1.626 m)   Wt 218 lb 3.2 oz (99 kg)   SpO2 98%   BMI 37.45 kg/m²     The patient is a well-developed, well-nourished female   in no acute distress. The patient is alert and oriented times three. The patient is alert and oriented times three. Mood and affect are normal.  LYMPHATIC: lymph nodes are not enlarged and are within normal limits  SKIN: normal in color and non tender to palpation. There are no bruises or abrasions noted. NEUROLOGICAL: Motor sensory exam is within normal limits. Reflexes are equal bilaterally. There is normal sensation to pinprick and light touch  MUSCULOSKELETAL:  Examination Right knee   Skin Intact   Range of motion 0-130   Effusion +   Medial joint line tenderness +   Lateral joint line tenderness -   Tenderness Pes Bursa -   Tenderness insertion MCL -   Tenderness insertion LCL -   Fabians -   Patella crepitus +   Patella grind +   Lachman -   Pivot shift -   Anterior drawer -   Posterior drawer -   Varus stress -   Valgus stress -   Neurovascular Intact   Calf Swelling and Tenderness to Palpation -   Anika's Test -   Hamstring Cord Tightness -     PROCEDURE: Right Knee Injection with Ultrasound Guidance  Indication:Right Knee pain/swelling    After sterile prep, 4 cc of Xylocaine and 1 cc of Kenalog were injected into the right knee. Ultrasound images captured using [facility] Ultrasound machine and scanned into patient's chart.        [facility]  OFFICE PROCEDURE PROGRESS NOTE        Chart reviewed for the following:  Tanisha Lizarraga M.D, have reviewed the History, Physical and updated the Allergic reactions for [address] performed immediately prior to start of procedure:  Tanisha Lizarraga M.D, have performed the following reviews on Prashant Andrea prior to the start of the procedure:            * Patient was identified by name and date of birth   * Agreement on procedure being performed was verified  * Risks and Benefits explained to the patient  * Procedure site verified and marked as necessary  * Patient was positioned for comfort  * Consent was signed and verified     Time: 10:12 AM     Date of procedure: 12/31/2019    Procedure performed by:  Lindsay Fernandez M.D    Provider assisted by: (see medication administration)    How tolerated by patient: tolerated the procedure well with no complications    Comments: none      IMAGING: XR of right knee dated 12/31/19 was reviewed and read by Dr. Ochoa De Jesus: Marked degenerative changes in the medial compartment. IMPRESSION:      ICD-10-CM ICD-9-CM    1. Primary osteoarthritis of right knee M17.11 715.16 TRIAMCINOLONE ACETONIDE INJ      triamcinolone acetonide (KENALOG) 40 mg/mL injection      US GUIDE INJ/ASP/ARTHRO LG JNT/BURSA   2. Right knee pain, unspecified chronicity M25.561 719.46 AMB POC X-RAY KNEE 3 VIEW        PLAN:  1. Pt presents today with right knee pain due to primary OA and I would like to try an injection today. Risk factors include: BMI>35  2. No ultrasound exam indicated today  3. Yes cortisone injection indicated today R KNEE US  4. No Physical/Occupational Therapy indicated today  5. No diagnostic test indicated today:   6. No durable medical equipment indicated today  7. No referral indicated today   8. No medications indicated today:   9. No Narcotic indicated today       RTC 3 weeks if pain continues      Scribed by Gianna Mendoza [address]) as dictated by Lindsay Fernandez MD    I, Dr. Lindsay Fernandez, confirm that all documentation is accurate.     Lindsay Fernandez M.D.   [facility]

## 2019-12-31 NOTE — PROGRESS NOTES
1. Have you been to the ER, urgent care clinic since your last visit? Hospitalized since your last visit? No    2. Have you seen or consulted any other health care providers outside of the [address] [place] [address] since your last visit? Include any pap smears or colon screening.  No

## 2020-01-06 ENCOUNTER — TELEPHONE (OUTPATIENT)
Dept: FAMILY MEDICINE CLINIC | Age: 75
End: 2020-01-06

## 2020-01-28 ENCOUNTER — OFFICE VISIT (OUTPATIENT)
Dept: ORTHOPEDIC SURGERY | Age: 75
End: 2020-01-28

## 2020-01-28 VITALS
OXYGEN SATURATION: 97 % | WEIGHT: 214.6 LBS | SYSTOLIC BLOOD PRESSURE: 144 MMHG | BODY MASS INDEX: 36.64 KG/M2 | HEIGHT: 64 IN | DIASTOLIC BLOOD PRESSURE: 92 MMHG | RESPIRATION RATE: 16 BRPM | TEMPERATURE: 97.4 F | HEART RATE: 62 BPM

## 2020-01-28 DIAGNOSIS — M17.11 PRIMARY OSTEOARTHRITIS OF RIGHT KNEE: Primary | ICD-10-CM

## 2020-01-28 NOTE — PROGRESS NOTES
Gabi Mayen  1945   Chief Complaint   Patient presents with    Knee Pain     right knee pain        HISTORY OF PRESENT ILLNESS  Gabi Mayen is a 69 y.o. female who presents today for reevaluation of right knee pain. Patient rates pain as 0/10 today. At last OV on 12/31/19, patient had a cortisone injection which provided relief. She states her knee is back to feeling how it normally feels. Has been taking Naproxen occasionally. Patient denies any fever, chills, chest pain, shortness of breath or calf pain. The remainder of the review of systems is negative. There are no new illness or injuries to report since last seen in the office. There are no changes to medications, allergies, family or social history. PHYSICAL EXAM:   Visit Vitals  BP (!) 144/92 (BP 1 Location: Right arm, BP Patient Position: Sitting)   Pulse 62   Temp 97.4 °F (36.3 °C) (Oral)   Resp 16   Ht 5' 4\" (1.626 m)   Wt 214 lb 9.6 oz (97.3 kg)   SpO2 97%   BMI 36.84 kg/m²     The patient is a well-developed, well-nourished female   in no acute distress. The patient is alert and oriented times three. The patient is alert and oriented times three. Mood and affect are normal.  LYMPHATIC: lymph nodes are not enlarged and are within normal limits  SKIN: normal in color and non tender to palpation. There are no bruises or abrasions noted. NEUROLOGICAL: Motor sensory exam is within normal limits. Reflexes are equal bilaterally.  There is normal sensation to pinprick and light touch  MUSCULOSKELETAL:  Examination Right knee   Skin Intact   Range of motion 0-130   Effusion +   Medial joint line tenderness +   Lateral joint line tenderness -   Tenderness Pes Bursa -   Tenderness insertion MCL -   Tenderness insertion LCL -   Fabians -   Patella crepitus +   Patella grind +   Lachman -   Pivot shift -   Anterior drawer -   Posterior drawer -   Varus stress -   Valgus stress -   Neurovascular Intact   Calf Swelling and Tenderness to Palpation -   Anika's Test -   Hamstring Cord Tightness -        IMAGING: XR of right knee dated 12/31/19 was reviewed and read by Dr. Missy Salinas: Marked degenerative changes in the medial compartment. IMPRESSION:      ICD-10-CM ICD-9-CM    1. Primary osteoarthritis of right knee M17.11 715.16         PLAN:   1. Pt presents today with right knee pain due to primary OA that was helped by the cortisone injection given at last OV. She can return as needed. Risk factors include: BMI>35  2. No ultrasound exam indicated today  3. No cortisone injection indicated today   4. No Physical/Occupational Therapy indicated today  5. No diagnostic test indicated today:   6. No durable medical equipment indicated today  7. No referral indicated today   8. No medications indicated today:   9. No Narcotic indicated today      RTC prn      Scribed by [address]) as dictated by Amairani Ferreira MD    I, Dr. Amairani Ferreira, confirm that all documentation is accurate.     Amairani Ferreira M.D.   [facility]

## 2020-02-13 ENCOUNTER — OFFICE VISIT (OUTPATIENT)
Dept: FAMILY MEDICINE CLINIC | Age: 75
End: 2020-02-13

## 2020-02-13 VITALS
WEIGHT: 214.8 LBS | HEART RATE: 76 BPM | DIASTOLIC BLOOD PRESSURE: 81 MMHG | TEMPERATURE: 98.5 F | SYSTOLIC BLOOD PRESSURE: 125 MMHG | HEIGHT: 64 IN | RESPIRATION RATE: 18 BRPM | BODY MASS INDEX: 36.67 KG/M2

## 2020-02-13 DIAGNOSIS — R63.4 WEIGHT LOSS: ICD-10-CM

## 2020-02-13 DIAGNOSIS — R10.12 LUQ ABDOMINAL PAIN: Primary | ICD-10-CM

## 2020-02-13 NOTE — PROGRESS NOTES
Chief Complaint   Patient presents with    Weight Management    Abdominal Pain     Patient stated that she been having upper left abdominal pain and a pintching feeling x sometime. HISTORY OF PRESENT ILLNESS  Alvy Baumgarten is a 69 y.o. female. HPI  Pt reports that she feels she is losing wt. Pt reports that her oncologist has moved her follow ups from every 3 months to every 6 months. Pt c/o LUQ discomfort. It is not constant. It is a \"pinch\" that she may feel when she turns over in bed but it does not happen every time. No gerd sx. Review of Systems   Constitutional: Negative. HENT: Negative. Respiratory: Negative. Cardiovascular: Negative. Gastrointestinal: Positive for abdominal pain. Negative for constipation, diarrhea, heartburn, nausea and vomiting. All other systems reviewed and are negative. Physical Exam  Vitals signs and nursing note reviewed. Constitutional:       Appearance: Normal appearance. HENT:      Head: Normocephalic and atraumatic. Right Ear: External ear normal.      Left Ear: External ear normal.      Nose: Nose normal.      Mouth/Throat:      Mouth: Mucous membranes are moist.   Eyes:      Extraocular Movements: Extraocular movements intact. Conjunctiva/sclera: Conjunctivae normal.   Neck:      Musculoskeletal: Normal range of motion and neck supple. Cardiovascular:      Rate and Rhythm: Normal rate and regular rhythm. Heart sounds: No murmur. No friction rub. No gallop. Pulmonary:      Effort: Pulmonary effort is normal.      Breath sounds: Normal breath sounds. No wheezing, rhonchi or rales. Abdominal:      General: Bowel sounds are normal. There is no distension. Palpations: Abdomen is soft. There is no mass. Tenderness: There is abdominal tenderness in the left upper quadrant. There is no guarding or rebound. Negative signs include Cota's sign and McBurney's sign. Skin:     General: Skin is warm and dry. ASSESSMENT and PLAN  Diagnoses and all orders for this visit:    1. LUQ abdominal pain  -     US ABD COMP; Future    2. Weight loss  Has returned to baseline; will cont to monitor. No alarming signs at this point in time. Follow-up and Dispositions    · Return for weight check, review of imaging.

## 2020-02-13 NOTE — PROGRESS NOTES
Gaudenciodolores Lacy presents today for   Chief Complaint   Patient presents with    Weight Management    Abdominal Pain     Patient stated that she been having upper left abdominal pain and a pintching feeling x sometime. Is someone accompanying this pt? no    Is the patient using any DME equipment during [address]? no    Depression Screening:  3 most recent PHQ Screens 2/13/2020   Little interest or pleasure in doing things Not at all   Feeling down, depressed, irritable, or hopeless Not at all   Total Score PHQ 2 0       Learning Assessment:  Learning Assessment 2/13/2020   PRIMARY LEARNER Patient   HIGHEST LEVEL OF EDUCATION - PRIMARY LEARNER  SOME COLLEGE   BARRIERS PRIMARY LEARNER NONE   CO-LEARNER CAREGIVER No   PRIMARY LANGUAGE ENGLISH   LEARNER PREFERENCE PRIMARY LISTENING   ANSWERED BY self   RELATIONSHIP SELF       Abuse Screening:  Abuse Screening Questionnaire 2/13/2020   Do you ever feel afraid of your partner? N   Are you in a relationship with someone who physically or mentally threatens you? N   Is it safe for you to go home? Y       Fall Screening  Fall Risk Assessment, last 12 mths 2/13/2020   Able to walk? Yes   Fall in past 12 months? No       Generalized Anxiety  No flowsheet data found. Health Maintenance Due   Topic Date Due    Shingrix Vaccine Age 49> (1 of 2) 10/24/1996   . Health Maintenance reviewed and discussed and ordered per Provider. Gaudencio Lacy is updated on all     Coordination of Care  1. Have you been to the ER, urgent care clinic since your last visit? Hospitalized since your last visit? no    2. Have you seen or consulted any other health care providers outside of the [address] [place] [address] since your last visit? Include any pap smears or colon screening. no      Advance Directive:  1. Do you have an advance directive in place? Patient Reply:no    2. If not, would you like material regarding how to put one in place?  Patient Reply: no

## 2020-02-19 ENCOUNTER — HOSPITAL ENCOUNTER (OUTPATIENT)
Dept: ULTRASOUND IMAGING | Age: 75
Discharge: HOME | End: 2020-02-19
Attending: FAMILY MEDICINE
Payer: MEDICARE

## 2020-02-19 DIAGNOSIS — R10.12 LUQ ABDOMINAL PAIN: ICD-10-CM

## 2020-02-19 PROCEDURE — 76700 US EXAM ABDOM COMPLETE: CPT

## 2020-02-24 ENCOUNTER — OFFICE VISIT (OUTPATIENT)
Dept: FAMILY MEDICINE CLINIC | Age: 75
End: 2020-02-24

## 2020-02-24 VITALS
RESPIRATION RATE: 18 BRPM | DIASTOLIC BLOOD PRESSURE: 86 MMHG | HEIGHT: 64 IN | TEMPERATURE: 98 F | WEIGHT: 217.6 LBS | BODY MASS INDEX: 37.15 KG/M2 | HEART RATE: 72 BPM | SYSTOLIC BLOOD PRESSURE: 142 MMHG

## 2020-02-24 DIAGNOSIS — R14.1 GAS PAIN: ICD-10-CM

## 2020-02-24 DIAGNOSIS — R10.12 LEFT UPPER QUADRANT PAIN: ICD-10-CM

## 2020-02-24 DIAGNOSIS — L60.8 CHANGE IN NAIL APPEARANCE: Primary | ICD-10-CM

## 2020-02-24 NOTE — PROGRESS NOTES
Chief Complaint   Patient presents with    Labs     Ultrasound on 2/19/20     HISTORY OF PRESENT ILLNESS  Cosme Muñoz is a 69 y.o. female. HPI  Patient presents today for discussion of her recent abdominal ultrasound. At her last visit, patient had concerns about left upper quadrant pain that occurred intermittently. Imaging reviewed in detail with the patient and spouse. Patient would also like to discuss L 2nd toe nail darkening. She does not recall any injury. There is no associated pain. All of the other nails are normal.  Review of Systems   Constitutional: Negative. HENT: Negative. Respiratory: Negative. Cardiovascular: Negative. Gastrointestinal: Positive for abdominal pain. Skin:        Nail color change   All other systems reviewed and are negative. Physical Exam  Physical Exam   Nursing note and vitals reviewed. Constitutional: She is oriented to person, place, and time. She appears well-developed and well-nourished. HENT:   Head: Normocephalic and atraumatic. Right Ear: External ear normal.   Left Ear: External ear normal.   Nose: Nose normal.   Eyes: Conjunctivae and EOM are normal.   Neck: Normal range of motion. Neck supple. No JVD present. Carotid bruit is not present. No thyromegaly present. Cardiovascular: Normal rate, regular rhythm, normal heart sounds and intact distal pulses. Exam reveals no gallop and no friction rub. No murmur heard. Pulmonary/Chest: Effort normal and breath sounds normal. She has no wheezes. She has no rhonchi. She has no rales. Abdominal: Soft. Bowel sounds are normal.   Musculoskeletal: Normal range of motion. Neurological: She is alert and oriented to person, place, and time. Coordination normal.   Skin: Skin is warm and dry. Left second toe: Darkening of nail noted. No thickening of nail. No obvious trauma. Psychiatric: She has a normal mood and affect.  Her behavior is normal. Judgment and thought content normal. ASSESSMENT and PLAN  Diagnoses and all orders for this visit:    1. Change in nail appearance  Patient given contact information for dermatology. Patient to schedule her own appointment. Discussed possible etiologies from trauma to fungal infection to melanoma. 2. Left upper quadrant pain  3. Gas pain  Etiology unclear as ultrasound was essentially normal.  At great length, I explained to patient that bowel gas was appreciated by the radiologist in the evaluation of her imaging. It may be that intermittent gassiness is causing her pain. Patient could work on decreasing gas causing foods. However, patient prefers to proceed with evaluation with GI. Contact information given. Follow-up and Dispositions    · Return if symptoms worsen or fail to improve.

## 2020-12-15 ENCOUNTER — DOCUMENTATION ONLY (OUTPATIENT)
Dept: FAMILY MEDICINE CLINIC | Age: 75
End: 2020-12-15

## 2020-12-18 ENCOUNTER — DOCUMENTATION ONLY (OUTPATIENT)
Dept: FAMILY MEDICINE CLINIC | Age: 75
End: 2020-12-18

## 2020-12-18 NOTE — PROGRESS NOTES
Patient called into the office to see how a virtual visit work. Confirmed with patient that she would get a text from the provider and that she would click the link. At that point she will go into the virtual waiting room. I instructed patient to make sure that her audio and video was on, and that the provider will join when she is ready for the visit. Patient voiced understanding.

## 2020-12-22 ENCOUNTER — VIRTUAL VISIT (OUTPATIENT)
Dept: FAMILY MEDICINE CLINIC | Age: 75
End: 2020-12-22
Payer: MEDICARE

## 2020-12-22 DIAGNOSIS — M79.671 RIGHT FOOT PAIN: Primary | ICD-10-CM

## 2020-12-22 PROCEDURE — 99213 OFFICE O/P EST LOW 20 MIN: CPT | Performed by: FAMILY MEDICINE

## 2020-12-22 PROCEDURE — G0463 HOSPITAL OUTPT CLINIC VISIT: HCPCS | Performed by: FAMILY MEDICINE

## 2020-12-22 PROCEDURE — 3017F COLORECTAL CA SCREEN DOC REV: CPT | Performed by: FAMILY MEDICINE

## 2020-12-22 PROCEDURE — G8432 DEP SCR NOT DOC, RNG: HCPCS | Performed by: FAMILY MEDICINE

## 2020-12-22 PROCEDURE — 1090F PRES/ABSN URINE INCON ASSESS: CPT | Performed by: FAMILY MEDICINE

## 2020-12-22 PROCEDURE — G8756 NO BP MEASURE DOC: HCPCS | Performed by: FAMILY MEDICINE

## 2020-12-22 PROCEDURE — G8399 PT W/DXA RESULTS DOCUMENT: HCPCS | Performed by: FAMILY MEDICINE

## 2020-12-22 PROCEDURE — G9899 SCRN MAM PERF RSLTS DOC: HCPCS | Performed by: FAMILY MEDICINE

## 2020-12-22 PROCEDURE — G8427 DOCREV CUR MEDS BY ELIG CLIN: HCPCS | Performed by: FAMILY MEDICINE

## 2020-12-22 PROCEDURE — 1101F PT FALLS ASSESS-DOCD LE1/YR: CPT | Performed by: FAMILY MEDICINE

## 2020-12-22 NOTE — PROGRESS NOTES
Stephanie Day presents today for   Chief Complaint   Patient presents with    Foot Pain     Right foot at joint of pinky toe. Feels some discomfort when walking for a few weeks. Virtual/telephone visit    Depression Screening:  3 most recent PHQ Screens 2/13/2020   Little interest or pleasure in doing things Not at all   Feeling down, depressed, irritable, or hopeless Not at all   Total Score PHQ 2 0       Learning Assessment:  Learning Assessment 2/13/2020   PRIMARY LEARNER Patient   HIGHEST LEVEL OF EDUCATION - PRIMARY LEARNER  SOME COLLEGE   BARRIERS PRIMARY LEARNER NONE   CO-LEARNER CAREGIVER No   PRIMARY LANGUAGE ENGLISH   LEARNER PREFERENCE PRIMARY LISTENING   ANSWERED BY self   RELATIONSHIP SELF       Fall Risk  Fall Risk Assessment, last 12 mths 2/13/2020   Able to walk? Yes   Fall in past 12 months? No       Travel Screening:   Travel Screening     Question   Response    In the last month, have you been in contact with someone who was confirmed or suspected to have Coronavirus / COVID-19? No / Unsure    Have you had a COVID-19 viral test in the last 14 days? Yes - Pending result (12/18/20, Pending result.)    Do you have any of the following new or worsening symptoms? None of these    Have you traveled internationally in the last month? No      Travel History   Travel since 11/22/20     No documented travel since 11/22/20          Health Maintenance reviewed and discussed and ordered per Provider. Health Maintenance Due   Topic Date Due    Shingrix Vaccine Age 49> (1 of 2) 10/24/1996    Flu Vaccine (1) 09/01/2020    Medicare Yearly Exam  10/18/2020   . Coordination of Care:  1. Have you been to the ER, urgent care clinic since your last visit? Hospitalized since your last visit? No    2. Have you seen or consulted any other health care providers outside of the [address] [place] [address] since your last visit? Include any pap smears or colon screening.  No

## 2020-12-22 NOTE — PROGRESS NOTES
Partha Brewer is a 77 y.o. female who was seen by synchronous (real-time) audio-video technology on 12/22/2020 for Foot Pain (Right foot at joint of pinky toe. Feels some discomfort when walking for a few weeks.)        Assessment & Plan:   Diagnoses and all orders for this visit:    1. Right foot pain  Etiology unclear, possible bunionette. Pt to call podiatry to make an appt. Recommend voltaren gel qid prn. The complexity of medical decision making for this visit is moderate   Follow-up and Dispositions    · Return if symptoms worsen or fail to improve. 712  Subjective:   Patient contacted via doxy. me. Pt c/o intermittent R foot pain near 5th toe. It is a discomfort that feels like a pressure. She may feel it when walking. The pain is on the plantar surface of the foot near the base of the 5th toe. She does not have any discomfort when standing, just at times when walking. Prior to Admission medications    Medication Sig Start Date End Date Taking? Authorizing Provider   aspirin delayed-release (ADULT LOW DOSE ASPIRIN) 81 mg tablet Take 1 Tab by mouth daily. 11/15/16  Yes Zoltan Martinez MD   cholecalciferol (VITAMIN D3) 1,000 unit cap Take 1,000 Units by mouth every other day. Yes Provider, Historical   naproxen (NAPROSYN) 500 mg tablet Take 1 Tab by mouth two (2) times daily as needed for Pain. 10/18/19   Esperanza Sam MD   methocarbamol (ROBAXIN) 500 mg tablet Take 1 Tab by mouth nightly as needed.  10/31/17   Zoltan Martinez MD     Patient Active Problem List   Diagnosis Code    AGE (acute gastroenteritis) K52.9    Colon polyp, colonoscopy, 1/2011 K63.5    Elevated blood pressure [id number]    Breast pain, left N64.4    Abnormal EKG R94.31    ACP (advance care planning) Z71.89    Osteoarthritis of lumbar spine M47.816    Tubular adenoma of colon, colonscopy 5/2016 D12.6    Abnormal echocardiogram R93.1    Hypertension I10    Dyslipidemia E78.5    Severe obesity (BMI 35.0-39. 9) E66.01    Atypical ductal hyperplasia of breast N60.99    Pain in joint M25.50    Vitamin D deficiency E55.9    Acute pain of left shoulder M25.512    Acute pain of left knee M25.562     Current Outpatient Medications   Medication Sig Dispense Refill    aspirin delayed-release (ADULT LOW DOSE ASPIRIN) 81 mg tablet Take 1 Tab by mouth daily. 30 Tab prn    cholecalciferol (VITAMIN D3) 1,000 unit cap Take 1,000 Units by mouth every other day.  naproxen (NAPROSYN) 500 mg tablet Take 1 Tab by mouth two (2) times daily as needed for Pain. 60 Tab 12    methocarbamol (ROBAXIN) 500 mg tablet Take 1 Tab by mouth nightly as needed. 20 Tab 0     No Known Allergies  Past Medical History:   Diagnosis Date    Breast mass     right,  excisional biopsy 1/15,  atypical ductal hyperplasia ([facility] 1/15)    Cardiac echocardiogram 12/02/2016    Ltd study to evaluate LA size. EF 65%. No RWMA. LA size was upper limits of normal (decreased in size since prev study of 9/23/16). Mild MR.      Colon polyps     Hemorrhoids      Past Surgical History:   Procedure Laterality Date    BREAST SURGERY PROCEDURE UNLISTED      biopsy, uncertain of which side    COLONOSCOPY N/A 5/19/2016    COLONOSCOPY with polypectomy performed by Matt Us MD at [facility] ENDOSCOPY    COLONOSCOPY N/A 2/13/2018    COLONOSCOPY / polypectomy performed by Richard Mcdonald MD at [facility] ENDOSCOPY    HX HYSTERECTOMY       Family History   Problem Relation Age of Onset    Hypertension Mother     Prostate Cancer Father      Social History     Tobacco Use    Smoking status: Former Smoker     Years: 13.00    Smokeless tobacco: Never Used    Tobacco comment: occasional, stopped at age 37   Substance Use Topics    Alcohol use: No       Review of Systems   Constitutional: Negative. HENT: Negative. Respiratory: Negative. Cardiovascular: Negative.     Musculoskeletal:        R foot pain   All other systems reviewed and are negative. Objective:   No flowsheet data found. General: alert, cooperative, no distress   Mental  status: normal mood, behavior, speech, dress, motor activity, and thought processes, able to follow commands   HENT: NCAT   Neck: no visualized mass   Resp: no respiratory distress   Neuro: no gross deficits   Skin: no discoloration or lesions of concern on visible areas   Psychiatric: normal affect, consistent with stated mood, no evidence of hallucinations     Additional exam findings: We discussed the expected course, resolution and complications of the diagnosis(es) in detail. Medication risks, benefits, costs, interactions, and alternatives were discussed as indicated. I advised her to contact the office if her condition worsens, changes or fails to improve as anticipated. She expressed understanding with the diagnosis(es) and plan. Carey Menjivar, who was evaluated through a patient-initiated, synchronous (real-time) audio-video encounter, and/or her healthcare decision maker, is aware that it is a billable service, with coverage as determined by her insurance carrier. She provided verbal consent to proceed: Yes, and patient identification was verified. It was conducted pursuant to the emergency declaration under the [facility], [address] [place] [address] authority and the [employer] General Act. A caregiver was present when appropriate. Ability to conduct physical exam was limited. I was in the office. The patient was at home.       Tamir Bill MD

## 2021-05-05 ENCOUNTER — VIRTUAL VISIT (OUTPATIENT)
Dept: FAMILY MEDICINE CLINIC | Age: 76
End: 2021-05-05
Payer: MEDICARE

## 2021-05-05 DIAGNOSIS — E66.01 SEVERE OBESITY WITH BODY MASS INDEX (BMI) OF 35.0 TO 39.9 WITH SERIOUS COMORBIDITY (HCC): ICD-10-CM

## 2021-05-05 DIAGNOSIS — M25.511 ACUTE PAIN OF RIGHT SHOULDER: Primary | ICD-10-CM

## 2021-05-05 PROCEDURE — 99213 OFFICE O/P EST LOW 20 MIN: CPT | Performed by: NURSE PRACTITIONER

## 2021-05-05 PROCEDURE — G0463 HOSPITAL OUTPT CLINIC VISIT: HCPCS | Performed by: NURSE PRACTITIONER

## 2021-05-05 NOTE — PROGRESS NOTES
Alexandra Luis presents today for   Chief Complaint   Patient presents with    Shoulder Problem     right shoulder pain off and on     Neck Pain     right side of neck       Virtual/telephone visit    Depression Screening:  3 most recent PHQ Screens 5/5/2021   Little interest or pleasure in doing things Not at all   Feeling down, depressed, irritable, or hopeless Not at all   Total Score PHQ 2 0       Learning Assessment:  Learning Assessment 2/13/2020   PRIMARY LEARNER Patient   HIGHEST LEVEL OF EDUCATION - PRIMARY LEARNER  SOME COLLEGE   BARRIERS PRIMARY LEARNER NONE   CO-LEARNER CAREGIVER No   PRIMARY LANGUAGE ENGLISH   LEARNER PREFERENCE PRIMARY LISTENING   ANSWERED BY self   RELATIONSHIP SELF       Fall Risk  Fall Risk Assessment, last 12 mths 5/5/2021   Able to walk? Yes   Fall in past 12 months? 0   Do you feel unsteady? 0   Are you worried about falling 0       ADL  No flowsheet data found. Travel Screening:    Travel Screening      No screening recorded since 05/04/21 0000      Travel History   Travel since 04/05/21     No documented travel since 04/05/21          Health Maintenance reviewed and discussed and ordered per Provider. Health Maintenance Due   Topic Date Due    COVID-19 Vaccine (1) Never done    Shingrix Vaccine Age 50> (1 of 2) Never done    Medicare Yearly Exam  10/18/2020   . Coordination of Care:    1. Have you been to the ER, urgent care clinic since your last visit? Hospitalized since your last visit? No     2. Have you seen or consulted any other health care providers outside of the [address] [place] [address] since your last visit? Include any pap smears or colon screening.  No

## 2021-05-05 NOTE — PROGRESS NOTES
Roddy Sanchez is a 77 y.o. female who was seen by synchronous (real-time) audio-video technology on 5/5/2021 for Shoulder Problem (right shoulder pain off and on ) and Neck Pain (right side of neck)    Assessment & Plan:     1. Acute pain of right shoulder  Comments:  Consult ortho for further eval  Orders:  -     REFERRAL TO ORTHOPEDICS  2. Severe obesity with body mass index (BMI) of 35.0 to 39.9 with serious comorbidity (HCC)  Comments:  Diet and exercise as tolerated     Follow-up and Dispositions    · Return in about 4 weeks (around 6/2/2021) for [address], with PCP       SUBJECTIVE:     HPI    Right Shoulder Pain-  Onset: A couple of weeks  Reports injuring affected shoulder on the job about 35 years ago  She feels that the muscle on her neck and right side of face is \"loose\"  Pain starts on right shoulder and radiates up her neck and right cheek  She states that the strength of her right arm is not the same as her left side  Pain is intermittent, \"feels as if her shoulder is pulling away from her neck\"  Aggravating factors include turning head from side to side  No relieving factors identified  Unable to rate pain    Severe Obesity -  Diet: trying portion control, eating more fruits  Exercise: yard work  Comorbid:  HTN  Reported weight is 216 lbs    Current Outpatient Medications   Medication Sig Dispense Refill    aspirin delayed-release (ADULT LOW DOSE ASPIRIN) 81 mg tablet Take 1 Tab by mouth daily. 30 Tab prn    cholecalciferol (VITAMIN D3) 1,000 unit cap Take 1,000 Units by mouth every other day.  naproxen (NAPROSYN) 500 mg tablet Take 1 Tab by mouth two (2) times daily as needed for Pain. 60 Tab 12    methocarbamol (ROBAXIN) 500 mg tablet Take 1 Tab by mouth nightly as needed. 20 Tab 0       Review of Systems   Constitutional: Negative for chills, fever and malaise/fatigue. Respiratory: Negative for shortness of breath. Cardiovascular: Negative for chest pain.    Gastrointestinal: Negative for nausea and vomiting. Musculoskeletal: Positive for joint pain and neck pain. Neurological: Positive for weakness. Negative for dizziness, tingling and headaches. OBJECTIVE:     Physical Exam   Constitutional: Stable-appearing, in no distress, alert and oriented  HENT:   Head: Normocephalic and atraumatic. Ears:  Hearing grossly intact. Mouth:  No visible perioral lesions, cyanosis, or lip swelling. Pulmonary/Chest: Does not appear dyspneic, no audible wheezes or nasal flaring. Musculoskeletal: Grossly normal active ROM in upper extremities. Neurological:  Intact recent memory, no facial or eyelid drooping, no speech impairment, answering questions appropriately. Psychiatric: Judgment and insight good, normal mood and affect. We discussed the expected course, resolution and complications of the diagnosis(es) in detail. Medication risks, benefits, costs, interactions, and alternatives were discussed as indicated. I advised her to contact the office if her condition worsens, changes or fails to improve as anticipated. She expressed understanding with the diagnosis(es) and plan. Diann Araujo, who was evaluated through a synchronous (real-time) audio-video encounter, and/or her healthcare decision maker, is aware that it is a billable service, with coverage as determined by her insurance carrier. provided verbal consent to proceed: Yes, and patient identification was verified. It was conducted pursuant to the emergency declaration under the [address] [place] [address], [address] [place] [address] authority and the [employer] General Act. A caregiver was present when appropriate. Ability to conduct physical exam was limited. I was in the office. The patient was at home.     Leti , [place]

## 2021-05-10 ENCOUNTER — OFFICE VISIT (OUTPATIENT)
Dept: ORTHOPEDIC SURGERY | Age: 76
End: 2021-05-10
Payer: MEDICARE

## 2021-05-10 VITALS
WEIGHT: 210 LBS | OXYGEN SATURATION: 100 % | HEART RATE: 70 BPM | HEIGHT: 64 IN | BODY MASS INDEX: 35.85 KG/M2 | RESPIRATION RATE: 16 BRPM | TEMPERATURE: 97.7 F

## 2021-05-10 DIAGNOSIS — M25.511 ACUTE PAIN OF RIGHT SHOULDER: ICD-10-CM

## 2021-05-10 DIAGNOSIS — M75.51 CHRONIC BURSITIS OF RIGHT SHOULDER: ICD-10-CM

## 2021-05-10 DIAGNOSIS — S16.1XXA STRAIN OF NECK MUSCLE, INITIAL ENCOUNTER: Primary | ICD-10-CM

## 2021-05-10 DIAGNOSIS — M54.2 CERVICAL PAIN: ICD-10-CM

## 2021-05-10 PROCEDURE — G8756 NO BP MEASURE DOC: HCPCS | Performed by: SPECIALIST

## 2021-05-10 PROCEDURE — 3017F COLORECTAL CA SCREEN DOC REV: CPT | Performed by: SPECIALIST

## 2021-05-10 PROCEDURE — G8536 NO DOC ELDER MAL SCRN: HCPCS | Performed by: SPECIALIST

## 2021-05-10 PROCEDURE — G8427 DOCREV CUR MEDS BY ELIG CLIN: HCPCS | Performed by: SPECIALIST

## 2021-05-10 PROCEDURE — 99213 OFFICE O/P EST LOW 20 MIN: CPT | Performed by: SPECIALIST

## 2021-05-10 PROCEDURE — G8432 DEP SCR NOT DOC, RNG: HCPCS | Performed by: SPECIALIST

## 2021-05-10 PROCEDURE — 1090F PRES/ABSN URINE INCON ASSESS: CPT | Performed by: SPECIALIST

## 2021-05-10 PROCEDURE — G9899 SCRN MAM PERF RSLTS DOC: HCPCS | Performed by: SPECIALIST

## 2021-05-10 PROCEDURE — G8399 PT W/DXA RESULTS DOCUMENT: HCPCS | Performed by: SPECIALIST

## 2021-05-10 PROCEDURE — 1101F PT FALLS ASSESS-DOCD LE1/YR: CPT | Performed by: SPECIALIST

## 2021-05-10 PROCEDURE — G8417 CALC BMI ABV UP PARAM F/U: HCPCS | Performed by: SPECIALIST

## 2021-05-10 PROCEDURE — 20552 NJX 1/MLT TRIGGER POINT 1/2: CPT | Performed by: SPECIALIST

## 2021-05-10 PROCEDURE — 73030 X-RAY EXAM OF SHOULDER: CPT | Performed by: SPECIALIST

## 2021-05-10 RX ORDER — LOTEPREDNOL ETABONATE 3.8 MG/G
GEL OPHTHALMIC
COMMUNITY
Start: 2021-02-11 | End: 2021-05-18 | Stop reason: ALTCHOICE

## 2021-05-10 RX ORDER — PREDNISOLONE ACETATE 10 MG/ML
SUSPENSION/ DROPS OPHTHALMIC
COMMUNITY
Start: 2021-04-19 | End: 2021-09-20

## 2021-05-10 RX ORDER — BETAMETHASONE SODIUM PHOSPHATE AND BETAMETHASONE ACETATE 3; 3 MG/ML; MG/ML
3 INJECTION, SUSPENSION INTRA-ARTICULAR; INTRALESIONAL; INTRAMUSCULAR; SOFT TISSUE ONCE
Status: COMPLETED | OUTPATIENT
Start: 2021-05-10 | End: 2021-05-10

## 2021-05-10 RX ADMIN — BETAMETHASONE SODIUM PHOSPHATE AND BETAMETHASONE ACETATE 3 MG: 3; 3 INJECTION, SUSPENSION INTRA-ARTICULAR; INTRALESIONAL; INTRAMUSCULAR; SOFT TISSUE at 13:33

## 2021-05-10 NOTE — PROGRESS NOTES
Patient: Benson Hernandez                MRN: [id number]       SSN: [id number]  YOB: 1945        AGE: 77 y.o. SEX: female    PCP: Sidney Houser MD  05/10/21    CC: RIGHT SHOULDER AND NECK PAIN    HISTORY:  Benson Hernandez is a 77 y.o. female who was referred by Dr. Sajan Cook for evaluation and treatment of right shoulder and neck pains. She has been experiencing right shoulder pain for the past two weeks. She dislocated her right shoulder in the 90s-- reduced at [facility] ED. She does not recall any recent shoulder or neck injury. She feels shoulder pain with overhead activities and at night. She is right handed. She is also seen for neck pain and stiffness. She feels as if she is losing flesh on the right side of her neck compared to the left. She states the right side of her neck is sinking. Pain Assessment  5/10/2021   Location of Pain Shoulder   Location Modifiers Right   Severity of Pain 0   Quality of Pain -   Duration of Pain -   Frequency of Pain -   Aggravating Factors (No Data)   Aggravating Factors Comment laying on the shoulder. Relieving Factors -   Result of Injury No     Occupation, etc:  Ms. Dante Silva is retired. 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 She lives in [place] with her . She has 3 sons and 1 daughter. She has 7 grandchildren. Ms. Dante Silva weighs 210 lbs and is 5'4\" tall.        No results found for: HBA1C, HGBE8, RXO5AICT, ESN2YAFT, TDT1NHYD  Weight Metrics 5/10/2021 2/24/2020 2/13/2020 1/28/2020 12/31/2019 12/18/2019 10/30/2019   Weight 210 lb 217 lb 9.6 oz 214 lb 12.8 oz 214 lb 9.6 oz 218 lb 3.2 oz 215 lb 215 lb 12.8 oz   BMI 36.05 kg/m2 37.35 kg/m2 36.87 kg/m2 36.84 kg/m2 37.45 kg/m2 36.9 kg/m2 37.04 kg/m2       Patient Active Problem List   Diagnosis Code    AGE (acute gastroenteritis) K52.9    Colon polyp, colonoscopy, 1/2011 K63.5    Elevated blood pressure [id number]    Breast pain, left N64.4    Abnormal EKG R94.31    ACP (advance care planning) Z71.89    Osteoarthritis of lumbar spine M47.816    Tubular adenoma of colon, colonscopy 5/2016 D12.6    Abnormal echocardiogram R93.1    Hypertension I10    Dyslipidemia E78.5    Severe obesity (BMI 35.0-39. 9) E66.01    Atypical ductal hyperplasia of breast N60.99    Pain in joint M25.50    Vitamin D deficiency E55.9    Acute pain of left shoulder M25.512    Acute pain of left knee M25.562     REVIEW OF SYSTEMS:    Constitutional Symptoms: Negative   Eyes: Negative   Ears, Nose, Throat and Mouth: Negative   Cardiovascular: Negative   Respiratory: Negative   Genitourinary: Per HPI   Gastrointestinal: Per HPI   Integumentary (Skin and/or Breast): Negative   Musculoskeletal: Per HPI   Endocrine/Rheumatologic: Negative   Neurological: Per HPI   Hematology/Lymphatic: Negative    Allergic/Immunologic: Negative   Phychiatric: Negative    Social History     Socioeconomic History    Marital status:      Spouse name: Not on file    Number of children: Not on file    Years of education: Not on file    Highest education level: Not on file   Occupational History    Not on file   Social Needs    Financial resource strain: Not on file    Food insecurity     Worry: Not on file     Inability: Not on file    Transportation needs     Medical: Not on file     Non-medical: Not on file   Tobacco Use    Smoking status: Former Smoker     Years: 13.00    Smokeless tobacco: Never Used    Tobacco comment: occasional, stopped at age 37   Substance and Sexual Activity    Alcohol use: No    Drug use: No    Sexual activity: Yes     Partners: Male   Lifestyle    Physical activity     Days per week: Not on file     Minutes per session: Not on file    Stress: Not on file   Relationships    Social connections     Talks on phone: Not on file     Gets together: Not on file     Attends Jain service: Not on file     Active member of club or organization: Not on file     Attends meetings of clubs or organizations: Not on file     Relationship status: Not on file    Intimate partner violence     Fear of current or ex partner: Not on file     Emotionally abused: Not on file     Physically abused: Not on file     Forced sexual activity: Not on file   Other Topics Concern    Not on file   Social History Narrative    Not on file      No Known Allergies   Current Outpatient Medications   Medication Sig    naproxen (NAPROSYN) 500 mg tablet Take 1 Tab by mouth two (2) times daily as needed for Pain.  methocarbamol (ROBAXIN) 500 mg tablet Take 1 Tab by mouth nightly as needed.  aspirin delayed-release (ADULT LOW DOSE ASPIRIN) 81 mg tablet Take 1 Tab by mouth daily.  cholecalciferol (VITAMIN D3) 1,000 unit cap Take 1,000 Units by mouth every other day.  prednisoLONE acetate (PRED FORTE) 1 % ophthalmic suspension instill 1 drop into right eye four times a day for 1 week then th. ..  (REFER TO PRESCRIPTION NOTES).  Lotemax SM 0.38 % drpg INSTILL 1 DROP INTO RIGHT EYE 4 TIMES A DAY FOR 1 WEEK, THEN 2 TIMES A DAY FOR ONE WEEK,THEN STOP     No current facility-administered medications for this visit.        PHYSICAL EXAMINATION:  Visit Vitals  Pulse 70   Temp 97.7 °F (36.5 °C) (Temporal)   Resp 16   Ht 5' 4\" (1.626 m)   Wt 210 lb (95.3 kg)   SpO2 100%   BMI 36.05 kg/m²      ORTHO EXAMINATION:  Examination Right shoulder Left shoulder   Skin Intact Intact   Effusion - -   Biceps deformity - -   Atrophy - -   AC joint tenderness - -   Acromial tenderness + -   Biceps tenderness - -   Forward flexion/Elevation  170   Active abduction  170   External rotation ROM 30 30   Internal rotation ROM 90 90   Apprehension - -   Impingement - -   Drop Arm Test - -   Neurovascular Intact Intact      Examination Neck   Skin Intact   Tenderness + R cervical trapezius   Tightness + R cervical trapezius   Flexion Decreased 25%   Extension Decreased 25%   Lateral bend left Normal   Lateral bend right Normal   Masses -   Biceps reflex Normal   Triceps reflex Normal   Brachioradialis reflex Normal        TIME OUT:  Chart reviewed for the following:   I, Ksenia Oropeza MD, have reviewed the History, Physical and updated the Allergic reactions for [address] performed immediately prior to start of procedure:  Prudencio Rinaldi MD, have performed the following reviews on Kitty Yousif prior to the start of the procedure:          * Patient was identified by name and date of birth   * Agreement on procedure being performed was verified  * Risks and Benefits explained to the patient  * Procedure site verified and marked as necessary  * Patient was positioned for comfort  * Consent was obtained     Time: 1:26 PM     Date of procedure: 5/10/2021  Procedure performed by:  Ksenia Oropeza MD  Ms. Solo tolerated the procedure well with no complications. RADIOGRAPHS:  XR RIGHT SHOULDER 5/10/21 BIJAL  IMPRESSION:  Three views - No fractures, moderate acromioclavicular narrowing, mild glenohumeral narrowing, + calcific densities, small sharp osteophyte inferior to acrominion. Slight migration of humeral head relative to the glenoid    IMPRESSION:      ICD-10-CM ICD-9-CM    1. Strain of neck muscle, initial encounter  S16. 1XXA 847.0 betamethasone (CELESTONE) injection 3 mg      INJECT TRIGGER POINT, 1 OR 2      REFERRAL TO SPINE SURGERY      REFERRAL TO PHYSICAL THERAPY   2. Acute pain of right shoulder  M25.511 719.41 AMB POC XRAY, SHOULDER; COMPLETE, 2+      REFERRAL TO PHYSICAL THERAPY   3. Cervical pain  M54.2 723.1    4. Chronic bursitis of right shoulder  M75.51 726.10      PLAN:  After discussing treatment options, patient's right cervical trapezius was injected with 4 cc Marcaine and 1/2 cc Celestone. There is no need for surgery at this time. She will follow up at the Spine center.       Scribed by Mita Rothman ([place]) as dictated by Ksenia Oropeza MD

## 2021-05-18 ENCOUNTER — OFFICE VISIT (OUTPATIENT)
Dept: FAMILY MEDICINE CLINIC | Age: 76
End: 2021-05-18
Payer: MEDICARE

## 2021-05-18 VITALS
BODY MASS INDEX: 36.37 KG/M2 | DIASTOLIC BLOOD PRESSURE: 79 MMHG | HEART RATE: 62 BPM | HEIGHT: 64 IN | OXYGEN SATURATION: 98 % | TEMPERATURE: 98.3 F | WEIGHT: 213 LBS | SYSTOLIC BLOOD PRESSURE: 147 MMHG

## 2021-05-18 DIAGNOSIS — M75.41 IMPINGEMENT SYNDROME OF RIGHT SHOULDER: Primary | ICD-10-CM

## 2021-05-18 DIAGNOSIS — R03.0 ELEVATED BLOOD PRESSURE READING: ICD-10-CM

## 2021-05-18 DIAGNOSIS — M54.2 NECK PAIN: ICD-10-CM

## 2021-05-18 PROCEDURE — G8417 CALC BMI ABV UP PARAM F/U: HCPCS | Performed by: FAMILY MEDICINE

## 2021-05-18 PROCEDURE — G8399 PT W/DXA RESULTS DOCUMENT: HCPCS | Performed by: FAMILY MEDICINE

## 2021-05-18 PROCEDURE — G8753 SYS BP > OR = 140: HCPCS | Performed by: FAMILY MEDICINE

## 2021-05-18 PROCEDURE — G8536 NO DOC ELDER MAL SCRN: HCPCS | Performed by: FAMILY MEDICINE

## 2021-05-18 PROCEDURE — 1090F PRES/ABSN URINE INCON ASSESS: CPT | Performed by: FAMILY MEDICINE

## 2021-05-18 PROCEDURE — G8432 DEP SCR NOT DOC, RNG: HCPCS | Performed by: FAMILY MEDICINE

## 2021-05-18 PROCEDURE — 1101F PT FALLS ASSESS-DOCD LE1/YR: CPT | Performed by: FAMILY MEDICINE

## 2021-05-18 PROCEDURE — 3017F COLORECTAL CA SCREEN DOC REV: CPT | Performed by: FAMILY MEDICINE

## 2021-05-18 PROCEDURE — G8427 DOCREV CUR MEDS BY ELIG CLIN: HCPCS | Performed by: FAMILY MEDICINE

## 2021-05-18 PROCEDURE — G9899 SCRN MAM PERF RSLTS DOC: HCPCS | Performed by: FAMILY MEDICINE

## 2021-05-18 PROCEDURE — 99214 OFFICE O/P EST MOD 30 MIN: CPT | Performed by: FAMILY MEDICINE

## 2021-05-18 PROCEDURE — G8754 DIAS BP LESS 90: HCPCS | Performed by: FAMILY MEDICINE

## 2021-05-18 PROCEDURE — G0463 HOSPITAL OUTPT CLINIC VISIT: HCPCS | Performed by: FAMILY MEDICINE

## 2021-05-18 NOTE — PROGRESS NOTES
Chief Complaint   Patient presents with    Neck Pain     Right side of neck    Shoulder Pain     Right shoulder     HISTORY OF PRESENT ILLNESS  Fatimah Rudolph is a 77 y.o. female. HPI  Pt remains concerned that there is missing fatty tissue from her neck. She was seen by YINKA Goodwin and referred to ortho for further eval.  Pt was seen by Dr. Fely Pandya who recommended that pt go to PT and also be seen at spine clinic. Pt does not want to go to PT until she is assessed by the spine clinic. She really is adamant that she wants someone to assess the \"loss of fatty tissue. \"    Review of Systems   Constitutional: Negative. HENT: Negative. Respiratory: Negative. Cardiovascular: Negative. Musculoskeletal: Positive for neck pain. \"missing fatty tissue\" on the neck   All other systems reviewed and are negative. Physical Exam  Vitals signs and nursing note reviewed. Constitutional:       Appearance: Normal appearance. She is not ill-appearing. HENT:      Head: Normocephalic and atraumatic. Right Ear: External ear normal.      Left Ear: External ear normal.      Nose: Nose normal.      Mouth/Throat:      Mouth: Mucous membranes are moist.   Eyes:      Extraocular Movements: Extraocular movements intact. Conjunctiva/sclera: Conjunctivae normal.   Neck:      Musculoskeletal: Normal range of motion. Normal range of motion. Muscular tenderness present. Cardiovascular:      Rate and Rhythm: Normal rate and regular rhythm. Heart sounds: No murmur. No friction rub. No gallop. Pulmonary:      Effort: Pulmonary effort is normal.      Breath sounds: Normal breath sounds. No wheezing, rhonchi or rales. Musculoskeletal:      Right shoulder: She exhibits decreased range of motion, pain and decreased strength. She exhibits no bony tenderness, no swelling and no deformity. Left shoulder: Normal. She exhibits normal range of motion.       Comments: L shoulder: + Mana's, + Jay Roman Skin:     General: Skin is warm and dry. Neurological:      Mental Status: She is alert and oriented to person, place, and time. Coordination: Coordination normal.   Psychiatric:         Mood and Affect: Mood normal.         Behavior: Behavior normal.         Thought Content: Thought content normal.         Judgment: Judgment normal.         ASSESSMENT and PLAN  Diagnoses and all orders for this visit:    1. Impingement syndrome of right shoulder  -     REFERRAL TO PHYSICAL THERAPY  Pt prefers to hold off on starting PT until after the assessment of her neck. 2. Neck pain  Contact info given for spine center. Pt urged to call and sched appt as rec'd by Dr. Aida Mohs. 3. Elevated blood pressure reading  Possibly related to pain. Has hx of htn; no meds at this time. Recheck at f/u. Follow-up and Dispositions    · Return if symptoms worsen or fail to improve.     keep 7/8/21 appt.

## 2021-05-18 NOTE — PROGRESS NOTES
Dang Rojas presents today for   Chief Complaint   Patient presents with    Neck Pain     Right side of neck    Shoulder Pain     Right shoulder       Dang Jocelynee preferred language for health care discussion is english/other. Is someone accompanying this pt? No    Is the patient using any DME equipment during OV? No    Depression Screening:  3 most recent PHQ Screens 5/5/2021   Little interest or pleasure in doing things Not at all   Feeling down, depressed, irritable, or hopeless Not at all   Total Score PHQ 2 0       Learning Assessment:  Learning Assessment 5/5/2021   PRIMARY LEARNER Patient   HIGHEST LEVEL OF EDUCATION - PRIMARY LEARNER  SOME COLLEGE   BARRIERS PRIMARY LEARNER NONE   CO-LEARNER CAREGIVER No   PRIMARY LANGUAGE ENGLISH   LEARNER PREFERENCE PRIMARY LISTENING   ANSWERED BY patient    RELATIONSHIP SELF       Fall Risk  Fall Risk Assessment, last 12 mths 5/5/2021   Able to walk? Yes   Fall in past 12 months? 0   Do you feel unsteady? 0   Are you worried about falling 0       Travel Screening:   Travel Screening     Question   Response    In the last month, have you been in contact with someone who was confirmed or suspected to have Coronavirus / COVID-19? No / Unsure    Have you had a COVID-19 viral test in the last 14 days? No    Do you have any of the following new or worsening symptoms? None of these    Have you traveled internationally or domestically in the last month? No      Travel History   Travel since 04/18/21     No documented travel since 04/18/21          Health Maintenance reviewed and discussed and ordered per Provider. Health Maintenance Due   Topic Date Due    COVID-19 Vaccine (1) Never done    Shingrix Vaccine Age 50> (1 of 2) Never done    Medicare Yearly Exam  10/18/2020   . Coordination of Care:  1. Have you been to the ER, urgent care clinic since your last visit? Hospitalized since your last visit? No    2.  Have you seen or consulted any other health care providers outside of the [address] [place] [address] since your last visit? Include any pap smears or colon screening. Pt has seen Ortho since last visit.

## 2021-05-19 NOTE — PROGRESS NOTES
[facility]  [address], [place]   Phone: [phone]  Fax: [phone]        INITIAL CONSULTATION      HISTORY OF PRESENT ILLNESS:  Kvng Mac is a 77 y.o. female whom is referred from Dr. Tarsha Connelly secondary to right-sided neck and trapezius pain x 1 month. She rates her pain 2/10. She denies radicular symptoms. Her pain is exacerbated by reaching behind and overhead activity. Pt reports remote h/o right shoulder dislocation without surgical intervention. She has treated with muscle relaxants and Naprosyn with some benefit. Pt received a right trapezius trigger point injection with minima benefit. Pt has been referred to PT but has not attended. Patient denies previous spinal surgery, injections, or physical therapy/chiropractic care. Pt denies dropping things or loss of balance. Pt reports a 4 Ib weight loss x 1 month. Pt denies fever or skin changes. PmHx of gastroenteritis, HTN, obesity. Note from Dr. Tarsha Connelly dated 5/10/2021 indicating patient was seen with c/o right shoulder and neck pain x 2 weeks. No recent injuries. Treated with naprosyn and robaxin. Pt received a right trapezius trigger point injection. Referred to spine. The patient is RHD.  reviewed. Body mass index is 38.78 kg/m². PCP: Torres Salinas MD    Past Medical History:   Diagnosis Date    Breast mass     right,  excisional biopsy 1/15,  atypical ductal hyperplasia ([facility] 1/15)    Cardiac echocardiogram 12/02/2016    Ltd study to evaluate LA size. EF 65%. No RWMA. LA size was upper limits of normal (decreased in size since prev study of 9/23/16).   Mild MR.      Colon polyps     Hemorrhoids           Past Surgical History:   Procedure Laterality Date    COLONOSCOPY N/A 5/19/2016    COLONOSCOPY with polypectomy performed by Alfred Craig MD at [facility] ENDOSCOPY    COLONOSCOPY N/A 2/13/2018    COLONOSCOPY / polypectomy performed by Nori Stallings MD at [place] [address]  HX HYSTERECTOMY      NE BREAST SURGERY PROCEDURE UNLISTED      biopsy, uncertain of which side         Social History     Tobacco Use    Smoking status: Former Smoker     Years: 13.00    Smokeless tobacco: Never Used    Tobacco comment: occasional, stopped at age 37   Substance Use Topics    Alcohol use: No       Work status: The patient is retired. Marital status: . Current Outpatient Medications   Medication Sig Dispense Refill    prednisoLONE acetate (PRED FORTE) 1 % ophthalmic suspension instill 1 drop into right eye four times a day for 1 week then th. ..  (REFER TO PRESCRIPTION NOTES).  aspirin delayed-release (ADULT LOW DOSE ASPIRIN) 81 mg tablet Take 1 Tab by mouth daily. 30 Tab prn    cholecalciferol (VITAMIN D3) 1,000 unit cap Take 1,000 Units by mouth every other day. No Known Allergies         Family History   Problem Relation Age of Onset    Hypertension Mother     Prostate Cancer Father          REVIEW OF SYSTEMS  Constitutional symptoms: Negative  Eyes: Negative  Ears, Nose, Throat, and Mouth: Negative  Cardiovascular: Negative  Respiratory: Negative  Genitourinary: Negative  Integumentary (Skin and/or breast): Negative  Musculoskeletal: Positive for right-sided neck and trapezius pain. Perhaps some atrophy of the scalenes. Extremities: Negative for edema. Endocrine/Rheumatologic: Negative  Hematologic/Lymphatic: Negative  Allergic/Immunologic: Negative  Psychiatric: Negative       PHYSICAL EXAMINATION  Visit Vitals  BP (!) 149/90 (BP 1 Location: Left upper arm)   Pulse 69   Temp 98.7 °F (37.1 °C)   Resp 18   Ht 5' 2\" (1.575 m)   Wt 212 lb (96.2 kg)   SpO2 96%   BMI 38.78 kg/m²       CONSTITUTIONAL: NAD, A&O x 3  HEART: Regular rate and rhythm  GASTROINTESTINAL: Positive bowel sounds, soft, nontender, and nondistended  LUNGS: Clear to auscultation bilaterally. SKIN: Negative for rash.   RANGE OF MOTION: The patient has full passive range of motion in all four extremities. SENSATION: Sensation is intact to light touch throughout. MOTOR:   Straight Leg Raise: Negative, bilateral  Conn: Negative, bilateral  Tandem Gait: Neg. Deep tendon reflexes are 1 at the biceps, triceps, and brachioradialis bilaterally. Deep tendon reflexes are trace at the knees and 0 at the ankles bilaterally. Shoulder AB/Flex Elbow Flex Wrist Ext Elbow Ext Wrist Flex Hand Intrin Tone   Right +4/5 +4/5 +4/5 +4/5 +4/5 +4/5 +4/5   Left +4/5 +4/5 +4/5 +4/5 +4/5 +4/5 +4/5              Hip Flex Knee Ext Knee Flex Ankle DF GTE Ankle PF Tone   Right +4/5 +4/5 +4/5 +4/5 +4/5 +4/5 +4/5   Left +4/5 +4/5 +4/5 +4/5 +4/5 +4/5 +4/5     RADIOGRAPHS  Cervical spine plain films dated 5/21/2021. 2 views: AP and lateral. Revealed:  Mild disc space narrowing at C5-6. Moderate disc space narrowing at C6-7. Small anterior osteophytes noted at C5 and C6. No acute pathology identified. ASSESSMENT   Diagnoses and all orders for this visit:    1. Neck pain  -     AMB POC XRAY, SPINE, CERVICAL; 2 OR 3    2. Cervical spondylosis without myelopathy    3. Cervical neuritis    4. DDD (degenerative disc disease), cervical       IMPRESSIONS/RECOMMENDATIONS:  Patient presents today with c/o right-sided neck and trapezius pain. Multiple treatment options were discussed. I will refer her to physical therapy with an emphasis on HEP. I will start her on Medrol Dosepak followed by Naprosyn 500 mg BID. Patient is neurologically intact. I will see the patient back in 6 week's time or earlier if needed. Written by Hamilton Nunez, as dictated by Kayla Hedrick MD  I examined the patient, reviewed and agree with the note.

## 2021-05-21 ENCOUNTER — OFFICE VISIT (OUTPATIENT)
Dept: ORTHOPEDIC SURGERY | Age: 76
End: 2021-05-21
Payer: MEDICARE

## 2021-05-21 ENCOUNTER — APPOINTMENT (OUTPATIENT)
Dept: PHYSICAL THERAPY | Age: 76
End: 2021-05-21
Attending: SPECIALIST

## 2021-05-21 VITALS
OXYGEN SATURATION: 96 % | TEMPERATURE: 98.7 F | RESPIRATION RATE: 18 BRPM | DIASTOLIC BLOOD PRESSURE: 90 MMHG | SYSTOLIC BLOOD PRESSURE: 149 MMHG | HEART RATE: 69 BPM | WEIGHT: 212 LBS | BODY MASS INDEX: 39.01 KG/M2 | HEIGHT: 62 IN

## 2021-05-21 DIAGNOSIS — M54.12 CERVICAL NEURITIS: ICD-10-CM

## 2021-05-21 DIAGNOSIS — M54.2 NECK PAIN: Primary | ICD-10-CM

## 2021-05-21 DIAGNOSIS — M47.812 CERVICAL SPONDYLOSIS WITHOUT MYELOPATHY: ICD-10-CM

## 2021-05-21 DIAGNOSIS — M50.30 DDD (DEGENERATIVE DISC DISEASE), CERVICAL: ICD-10-CM

## 2021-05-21 PROCEDURE — G8755 DIAS BP > OR = 90: HCPCS | Performed by: PHYSICAL MEDICINE & REHABILITATION

## 2021-05-21 PROCEDURE — G8753 SYS BP > OR = 140: HCPCS | Performed by: PHYSICAL MEDICINE & REHABILITATION

## 2021-05-21 PROCEDURE — G8399 PT W/DXA RESULTS DOCUMENT: HCPCS | Performed by: PHYSICAL MEDICINE & REHABILITATION

## 2021-05-21 PROCEDURE — G8427 DOCREV CUR MEDS BY ELIG CLIN: HCPCS | Performed by: PHYSICAL MEDICINE & REHABILITATION

## 2021-05-21 PROCEDURE — G8432 DEP SCR NOT DOC, RNG: HCPCS | Performed by: PHYSICAL MEDICINE & REHABILITATION

## 2021-05-21 PROCEDURE — G8536 NO DOC ELDER MAL SCRN: HCPCS | Performed by: PHYSICAL MEDICINE & REHABILITATION

## 2021-05-21 PROCEDURE — 3017F COLORECTAL CA SCREEN DOC REV: CPT | Performed by: PHYSICAL MEDICINE & REHABILITATION

## 2021-05-21 PROCEDURE — 1101F PT FALLS ASSESS-DOCD LE1/YR: CPT | Performed by: PHYSICAL MEDICINE & REHABILITATION

## 2021-05-21 PROCEDURE — 72040 X-RAY EXAM NECK SPINE 2-3 VW: CPT | Performed by: PHYSICAL MEDICINE & REHABILITATION

## 2021-05-21 PROCEDURE — 1090F PRES/ABSN URINE INCON ASSESS: CPT | Performed by: PHYSICAL MEDICINE & REHABILITATION

## 2021-05-21 PROCEDURE — G8417 CALC BMI ABV UP PARAM F/U: HCPCS | Performed by: PHYSICAL MEDICINE & REHABILITATION

## 2021-05-21 PROCEDURE — 99204 OFFICE O/P NEW MOD 45 MIN: CPT | Performed by: PHYSICAL MEDICINE & REHABILITATION

## 2021-05-21 PROCEDURE — G9899 SCRN MAM PERF RSLTS DOC: HCPCS | Performed by: PHYSICAL MEDICINE & REHABILITATION

## 2021-05-21 RX ORDER — METHYLPREDNISOLONE 4 MG/1
TABLET ORAL
Qty: 1 DOSE PACK | Refills: 0 | Status: SHIPPED | OUTPATIENT
Start: 2021-05-21 | End: 2021-09-20

## 2021-05-21 RX ORDER — NAPROXEN 500 MG/1
500 TABLET ORAL 2 TIMES DAILY WITH MEALS
Qty: 30 TABLET | Refills: 0 | Status: SHIPPED | OUTPATIENT
Start: 2021-05-21 | End: 2021-09-20

## 2021-05-21 NOTE — LETTER
5/21/2021 Patient: Cass Rodriguez YOB: 1945 Date of Visit: 5/21/2021 Annie Rodriguez MD 
[address] [id number] [address] [address] [address] [facility] [address] Dear Annie Rodriguez MD, Thank you for referring Ms. Rama Do to [address] for evaluation. My notes for this consultation are attached. If you have questions, please do not hesitate to call me. I look forward to following your patient along with you. Sincerely, Prashant Lucsa MD

## 2021-06-04 ENCOUNTER — HOSPITAL ENCOUNTER (OUTPATIENT)
Dept: PHYSICAL THERAPY | Age: 76
Discharge: HOME | End: 2021-06-04
Attending: PHYSICAL MEDICINE & REHABILITATION
Payer: MEDICARE

## 2021-06-04 PROCEDURE — 97110 THERAPEUTIC EXERCISES: CPT

## 2021-06-04 PROCEDURE — 97161 PT EVAL LOW COMPLEX 20 MIN: CPT

## 2021-06-04 NOTE — PROGRESS NOTES
[facility] - [address]  [phone] fax    Plan of Care/ Statement of Necessity for Physical Therapy Services    Patient name: Roddy Sanchez Start of Care: 2021   Referral source: Caden Malave MD : 1945    Medical Diagnosis: Neck pain [M54.2]  Pain in right shoulder [M25.511]  Payor: [place] MEDICARE / Plan: VA MEDICARE PART A & B / Product Type: Medicare /  Onset Date: a month ago    Treatment Diagnosis:  Neck and right shoulder pain   Prior Hospitalization: see medical history Provider#: [id number]   Medications: Verified on Patient summary List    Comorbidities: none reported   Prior Level of Function: right hand dominant, Ind with ADLs     The Plan of Care and following information is based on the information from the initial evaluation. Assessment/ key information:  Pt. Is a 77year old female c/o right shoulder pain that began a month ago. She reports she dislocated her shoulder in the 90s and feels like she never fully recovered from this. She has most pain with lifting overhead and reaching behind her. She also has increased fatigue in neck with prolonged sitting or reading. Denies symptoms down arm. She presents with decreased right cervical rotation AROM at 50 degrees. She has decreased right shoulder strength with pain during testing. Excessive shrugging on right with reaching overhead. Deep cervical flexion endurance was 14 seconds. Negative Spurling and distraction tests, negative drop arm and ER lag sign. She has multiple trigger points and tightness along right infraspinatus and UT. Skilled PT is medically necessary in order to improve cervical and shoulder mobility and strength for increased ease of ADLs and improved quality of life.      Evaluation Complexity History LOW Complexity : Zero comorbidities / personal factors that will impact the outcome / POC; Examination MEDIUM Complexity : 3 Standardized tests and measures addressing body structure, function, activity limitation and / or participation in recreation  ;Presentation LOW Complexity : Stable, uncomplicated  ;Clinical Decision Making MEDIUM Complexity : FOTO score of 26-74  Overall Complexity Rating: LOW   Problem List: pain affecting function, decrease ROM, decrease strength, decrease ADL/ functional abilitiies, decrease activity tolerance and decrease flexibility/ joint mobility   Treatment Plan may include any combination of the following: Therapeutic exercise, Therapeutic activities, Neuromuscular re-education, Physical agent/modality, Manual therapy, Patient education, Self Care training and Functional mobility training  Patient / Family readiness to learn indicated by: asking questions  Persons(s) to be included in education: patient (P)  Barriers to Learning/Limitations: None  Patient Goal (s): to get stronger  Patient Self Reported Health Status: good  Rehabilitation Potential: good    Short Term Goals: To be accomplished in 1 weeks:  1. Patient will demonstrate compliance with HEP in order to improve right shoulder strength for increased ease of ADLs    Long Term Goals: To be accomplished in 4 weeks:  1. Patient will improve neck FOTO score by 10 points in order to demonstrate a significant improvement in function. 2. Patient will improve shoulder FOTO score by 5 points in order to demonstrate a significant improvement in function. 3. Patient will report no difficulty with turning to look behind her in order to increase ease of ADLs  4. Patient will improve right cervical rotation AROM to 60 degrees in order to increase ease of driving. 5. Patient will improve right shoulder ER MMT to 4/5 in order to increase ease of household chores. Frequency / Duration: Patient to be seen 2 times per week for 4 weeks.     Patient/ CarPatient/ Caregiver education and instruction: Diagnosis, prognosis, exercises   [x]  Plan of care has been reviewed with DALIA Teixeira, PT 6/4/2021 10:21 AM    ________________________________________________________________________    I certify that the above Therapy Services are being furnished while the patient is under my care. I agree with the treatment plan and certify that this therapy is necessary.     [de-identified] Signature:____________Date:_________TIME:________     Kang Lott MD  ** Signature, Date and Time must be completed for valid certification **    Please sign and return to [facility] - [facility] [employer]  [address] [place] [address]  [phone] fax

## 2021-06-04 NOTE — PROGRESS NOTES
PT DAILY TREATMENT NOTE/CERVICAL [id number]    Patient Name: Marya Sharpe  Date:2021  : 1945  [x]  Patient  Verified  Payor: VA MEDICARE / Plan: VA MEDICARE PART A & B / Product Type: Medicare /    In time: 9:45  Out time: 10:11  Total Treatment Time (min): 26  Visit #: 1 of 8    Medicare/BCBS Only   Total Timed Codes (min):  8 1:1 Treatment Time:  26     Treatment Area: Neck pain [M54.2]  Pain in right shoulder [M25.511]    SUBJECTIVE  Pain Level (0-10 scale):  0/10  []constant [x]intermittent []improving []worsening []no change since onset    Any medication changes, allergies to medications, adverse drug reactions, diagnosis change, or new procedure performed?: [x] No    [] Yes (see summary sheet for update)  Subjective functional status/changes:     PLOF: right hand dominant  Mechanism of Injury: pt. Reports symptoms began about a month ago. Thinks it may have occurred from an old job injury. In the 90s she dislocated her shoulder. Reports never had 100% use. Worse with reaching in back of car and lifting and lifting overhead. cortisone injection helped some. Also pain laying on right side. Increased fatigue in neck and back with prolonged sitting. Some fatigue with reading. Denies headaches and dizziness. Denies pain down arm. Work Hx: retired   Living Situation: lives with  Ind with dressing and bathing  Pt Goals: to have more strength.      OBJECTIVE/EXAMINATION    8 min Therapeutic Exercise:  [x] See flow sheet :   Rationale: increase ROM and increase strength to improve the patients ability to increase ease of ADLs          With   [x] TE   [] TA   [] neuro   [] other: Patient Education: [x] Review HEP    [] Progressed/Changed HEP based on:   [] positioning   [] body mechanics   [] transfers   [] heat/ice application    [] other:      Physical Therapy Evaluation Cervical Spine     OBJECTIVE  Posture: [] WNL  Head Position: fwd  Shoulder/Scapular Position: fwd  C-Kyphosis:  [] increased   [] decreased   C-Lordosis:   [] increased   [] decreased  T-Kyphosis:  [] increased   [] decreased  T-Lordosis:   [] increased   [] decreased     Cervical Retraction: [] WNL    [] Abnormal: deep cervical flexion endurance: 14 seconds    Shoulder/Scapular Screen: [] WNL    [] Abnormal: equal bilaterally     Active Movements: [] N/A   [] Too acute   [] Other:  ROM % AROM % PROM Comments:pain, area   Forward flexion 35     Extension 40     SB right 30     SB left  35     Rotation right 50  Pain on right   Rotation left 70       Palpation:  [] Min  [x] Mod  [] Severe    Location: infraspinatus and UT  [] Min  [] Mod  [] Severe    Location:  [] Min  [] Mod  [] Severe    Location:    Special Tests:  Cervical:        Vertebral Artery:  [] R    [] L    [] +    [] -       Alar Ligament: [x] R    [x] L    [] +    [x] -       Transverse Lig: [] R    [] L    [] +    [] -       Spurling's:  [x] R    [x] L    [] +    [x] -       Distraction:  [x] R    [x] L    [] +    [x] -       Compression: [x] R    [x] L    [] +    [x] -    Other tests/comments:  Shoulder MMT flex: right: 4-/5 left: 4+/5 abd; right: 4-/5 left: 4+/5 ER: right: 4-/5 left: 4/5 IR: B: 4/5. Negative drop arm and ER lag sign  Negative Hawkin's Shawn test  Evaluation was limited secondary to pt.  Having to leave early     Pain Level (0-10 scale) post treatment:  0/10    ASSESSMENT/Changes in Function:    [x]  See Plan of Care  []  See progress note/recertification  []  See Discharge Summary         Progress towards goals / Updated goals:  See POC     PLAN  []  Upgrade activities as tolerated     [x]  Continue plan of care  []  Update interventions per flow sheet       []  Discharge due to:_  []  Other:_      Dariusz Kinsey, PT 6/4/2021  9:46 AM

## 2021-06-14 ENCOUNTER — HOSPITAL ENCOUNTER (OUTPATIENT)
Dept: PHYSICAL THERAPY | Age: 76
Discharge: HOME | End: 2021-06-14
Attending: PHYSICAL MEDICINE & REHABILITATION
Payer: MEDICARE

## 2021-06-14 PROCEDURE — 97112 NEUROMUSCULAR REEDUCATION: CPT

## 2021-06-14 PROCEDURE — 97140 MANUAL THERAPY 1/> REGIONS: CPT

## 2021-06-14 PROCEDURE — 97110 THERAPEUTIC EXERCISES: CPT

## 2021-06-14 NOTE — PROGRESS NOTES
PT DAILY TREATMENT NOTE    Patient Name: Alycia Gave  Date:2021  : 1945  [x]  Patient  Verified  Payor: Trudy Barcenas / Plan: VA MEDICARE PART A & B / Product Type: Medicare /    In time: 3:00  Out time: 3:47  Total Treatment Time (min): 47  Visit #: 2 of 8     Medicare/BCBS Only   Total Timed Codes (min):  47 1:1 Treatment Time:  47     Treatment Area: Neck pain [M54.2]  Pain in right shoulder [M25.511]    SUBJECTIVE  Pain Level (0-10 scale):  0/10  []constant [x]intermittent []improving []worsening []no change since onset    Any medication changes, allergies to medications, adverse drug reactions, diagnosis change, or new procedure performed?: [x] No    [] Yes (see summary sheet for update)  Subjective functional status/changes:     Patient reports partial compliance with HEP, no issues reported. OBJECTIVE/EXAMINATION    14 min Therapeutic Exercise:  [x] See flow sheet :   Rationale: increase ROM and increase strength to improve the patients ability to increase ease of ADLs      8 min Therapeutic Activity:  [x]  See flow sheet : [place] rows/ext, wall wipes, supine wand flex/scap    Rationale: increase ROM, increase strength and improve coordination to improve the patients ability to perform proper posture, bed mobility and transfers without p!      10 min Neuromuscular Re-education:  [x]  See flow sheet : SA punch, supine chin tucks, postural re-education   Rationale: increase strength and increase proprioception  to improve the patients ability to perform functional reaching and ADLs. 15 min Manual Therapy:    Technique:       [x] STM []ASTM [x] TPR [x]PROM [x] Stretching  [x] Jt manipulation []Gr I [x] II [x]  III [] IV [] V []  Treatment Area: right shoulder/scapula  Other:     Rationale: decrease pain, increase ROM, increase tissue extensibility, decrease trigger points and increase postural awareness to allow patient to perform functional reaching.           With   [x] TE   [] TA [] neuro   [] other: Patient Education: [x] Review HEP    [] Progressed/Changed HEP based on:   [] positioning   [x] body mechanics   [] transfers   [x] heat/ice application    [] other:      Other Functional Tests & Measures:   - max challenge with supine chin tucks vs nods  - education on performing exercises to tolerance in pain free range     Pain Level (0-10 scale) post treatment: 0/10    ASSESSMENT/Changes in Function:    Initiated PT POC today per flow sheet, requiring vc and demo 100% of the time for proper form and technique with TE. Patient will continue to benefit from skilled PT services to modify and progress therapeutic interventions, address functional mobility deficits, address ROM deficits, address strength deficits, analyze and address soft tissue restrictions, analyze and cue movement patterns, analyze and modify body mechanics/ergonomics and assess and modify postural abnormalities to attain remaining goals. [x]  See Plan of Care  []  See progress note/recertification  []  See Discharge Summary         Progress towards goals / Updated goals:  Short Term Goals: To be accomplished in 1 weeks:  1. Patient will demonstrate compliance with HEP in order to improve right shoulder strength for increased ease of ADLs     Long Term Goals: To be accomplished in 4 weeks:  1. Patient will improve neck FOTO score by 10 points in order to demonstrate a significant improvement in function. 2. Patient will improve shoulder FOTO score by 5 points in order to demonstrate a significant improvement in function. 3. Patient will report no difficulty with turning to look behind her in order to increase ease of ADLs  4. Patient will improve right cervical rotation AROM to 60 degrees in order to increase ease of driving. 5. Patient will improve right shoulder ER MMT to 4/5 in order to increase ease of household chores.       PLAN  []  Upgrade activities as tolerated     [x]  Continue plan of care  []  Update interventions per flow sheet       []  Discharge due to:_  []  Other:_      KAREN Hughes 6/14/2021  3:47 PM

## 2021-06-16 ENCOUNTER — HOSPITAL ENCOUNTER (OUTPATIENT)
Dept: PHYSICAL THERAPY | Age: 76
Discharge: HOME | End: 2021-06-16
Attending: PHYSICAL MEDICINE & REHABILITATION
Payer: MEDICARE

## 2021-06-16 PROCEDURE — 97110 THERAPEUTIC EXERCISES: CPT

## 2021-06-16 PROCEDURE — 97140 MANUAL THERAPY 1/> REGIONS: CPT

## 2021-06-16 PROCEDURE — 97112 NEUROMUSCULAR REEDUCATION: CPT

## 2021-06-16 NOTE — PROGRESS NOTES
PT DAILY TREATMENT NOTE    Patient Name: Mami Dey  Date:2021  : 1945  [x]  Patient  Verified  Payor: Korey Joe / Plan: VA MEDICARE PART A & B / Product Type: Medicare /    In time: 9:46  Out time: 10:40  Total Treatment Time (min): 54  Visit #: 3 of 8     Medicare/BCBS Only   Total Timed Codes (min):  44 1:1 Treatment Time:  44     Treatment Area: Neck pain [M54.2]  Pain in right shoulder [M25.511]    SUBJECTIVE  Pain Level (0-10 scale):  0/10  []constant [x]intermittent []improving []worsening []no change since onset    Any medication changes, allergies to medications, adverse drug reactions, diagnosis change, or new procedure performed?: [x] No    [] Yes (see summary sheet for update)  Subjective functional status/changes:     Patient reports she felt pretty good when she left last time but was sore later that evening. OBJECTIVE/EXAMINATION    Modality rationale: decrease inflammation and decrease pain to improve the patients ability to perform functional reaching and ADLs.    Min Type Additional Details    [] Estim: []Att   []Unatt  []TENS instruct                 []IFC  []Premod []NMES                       []Other:  []w/US   []w/ice   []w/heat  Position:  Location:    []  Traction: [] Cervical       []Lumbar                       [] Prone          []Supine                       []Intermittent   []Continuous Lbs:  [] before manual  [] after manual    []  Ultrasound: []Continuous   [] Pulsed                           []1MHz   []3MHz Location:  W/cm2:    []  Iontophoresis with dexamethasone         Location: [] Take home patch   [] In clinic   10 [x]  Ice     []  heat  []  Ice massage Position: seated  Location: right shoulder    []  Vasopneumatic Device:  If using vaso (only need to measure limb vaso being performed on)      pre-treatment girth :       post-treatment girth :       measured at (landmark location)  Pressure: [] lo [] med [] hi   Temp: [] lo [] med [] hi   [x] Skin assessment post-treatment:  [x]intact []redness- no adverse reaction       []redness - adverse reaction:     14 min Therapeutic Exercise:  [x] See flow sheet :   Rationale: increase ROM and increase strength to improve the patients ability to increase ease of ADLs    8 min Therapeutic Activity:  [x]  See flow sheet : [place] rows/ext, wall wipes, supine wand flex/scap    Rationale: increase ROM, increase strength and improve coordination to improve the patients ability to perform proper posture, bed mobility and transfers without p!    10 min Neuromuscular Re-education:  [x]  See flow sheet : SA punch, supine chin tucks   Rationale: increase strength and increase proprioception  to improve the patients ability to perform functional reaching and ADLs. 12 min Manual Therapy: STM to (B) c/s ps; STM/DTM/TrPR to (B) UT/LS; TrPR to right supra/infraspinatus and teres minor/major  Technique:       [x] STM []ASTM [x] TPR [x]PROM [x] Stretching  [x] Jt manipulation []Gr I [x] II [x]  III [] IV [] V []  Treatment Area: right shoulder/scapula  Other:     Rationale: decrease pain, increase ROM, increase tissue extensibility, decrease trigger points and increase postural awareness to allow patient to perform functional reaching. With   [x] TE   [x] TA   [x] neuro   [] other: Patient Education: [x] Review HEP    [] Progressed/Changed HEP based on:   [] positioning   [x] body mechanics   [] transfers   [x] heat/ice application    [x] other: postural re-education     Other Functional Tests & Measures:   - improved form with chin tucks   - improved form with SA punches   - pt with decreased understanding of isometric exercise while performing c/s rot. Iso.  - progressed from supine to seated c/s SNAGs  - max cuing for form with rows     Pain Level (0-10 scale) post treatment: 0/10    ASSESSMENT/Changes in Function:    Patient tolerated session without pain only ms fatigue.  Moderate cuing for neutral c/s throughout session as pt demonstrates forward head rounded shoulder posture. She tolerated light progression reps and will continue to progress as able. Patient will continue to benefit from skilled PT services to modify and progress therapeutic interventions, address functional mobility deficits, address ROM deficits, address strength deficits, analyze and address soft tissue restrictions, analyze and cue movement patterns, analyze and modify body mechanics/ergonomics and assess and modify postural abnormalities to attain remaining goals. [x]  See Plan of Care  []  See progress note/recertification  []  See Discharge Summary         Progress towards goals / Updated goals:  Short Term Goals: To be accomplished in 1 weeks:  1. Patient will demonstrate compliance with HEP in order to improve right shoulder strength for increased ease of ADLs. Current: pt continues to report partial compliance with HEP. 6/16/21     Long Term Goals: To be accomplished in 4 weeks:  1. Patient will improve neck FOTO score by 10 points in order to demonstrate a significant improvement in function. 2. Patient will improve shoulder FOTO score by 5 points in order to demonstrate a significant improvement in function. 3. Patient will report no difficulty with turning to look behind her in order to increase ease of ADLs  4. Patient will improve right cervical rotation AROM to 60 degrees in order to increase ease of driving. 5. Patient will improve right shoulder ER MMT to 4/5 in order to increase ease of household chores.       PLAN  []  Upgrade activities as tolerated     [x]  Continue plan of care  []  Update interventions per flow sheet       []  Discharge due to:_  []  Other:_      KAREN Vieira 6/16/2021  10:40 AM

## 2021-06-23 ENCOUNTER — HOSPITAL ENCOUNTER (OUTPATIENT)
Dept: PHYSICAL THERAPY | Age: 76
Discharge: HOME | End: 2021-06-23
Attending: PHYSICAL MEDICINE & REHABILITATION
Payer: MEDICARE

## 2021-06-23 PROCEDURE — 97110 THERAPEUTIC EXERCISES: CPT

## 2021-06-23 PROCEDURE — 97140 MANUAL THERAPY 1/> REGIONS: CPT

## 2021-06-23 NOTE — PROGRESS NOTES
PT DAILY TREATMENT NOTE     Patient Name: Lukasz Márquez  Date:2021  : 1945  [x]  Patient  Verified  Payor: Lupe Iniguez / Plan: [place] / Product Type: Medicare /    In time: 12:45  Out time: 1:42  Total Treatment Time (min): 57  Visit #: 4 of 8    Medicare/BCBS Only   Total Timed Codes (min):  47 1:1 Treatment Time:  45       Treatment Area: Neck pain [M54.2]  Pain in right shoulder [M25.511]    SUBJECTIVE  Pain Level (0-10 scale): 0/10  Any medication changes, allergies to medications, adverse drug reactions, diagnosis change, or new procedure performed?: [x] No    [] Yes (see summary sheet for update)  Subjective functional status/changes:   [] No changes reported  Pt reports doing better overall.     OBJECTIVE    Modality rationale: decrease edema, decrease inflammation, decrease pain and increase tissue extensibility to improve the patients ability to improve tolerance ADLs   Min Type Additional Details    [] Estim:  []Unatt       []IFC  []Premod                        []Other:  []w/ice   []w/heat  Position:  Location:    [] Estim: []Att    []TENS instruct  []NMES                    []Other:  []w/US   []w/ice   []w/heat  Position:  Location:    []  Traction: [] Cervical       []Lumbar                       [] Prone          []Supine                       []Intermittent   []Continuous Lbs:  [] before manual  [] after manual    []  Ultrasound: []Continuous   [] Pulsed                           []1MHz   []3MHz W/cm2:  Location:    []  Iontophoresis with dexamethasone         Location: [] Take home patch   [] In clinic   10 [x]  Ice     []  heat  []  Ice massage  []  Laser   []  Anodyne Position: seated  Location: right shoulder    []  Laser with stim  []  Other:  Position:  Location:    []  Vasopneumatic Device    []  Right     []  Left  Pre-treatment girth:  Post-treatment girth:  Measured at (location):  Pressure:       [] lo [] med [] hi   Temperature: [] lo [] med [] hi   [] Skin assessment post-treatment:  []intact []redness- no adverse reaction    []redness - adverse reaction:     32 min Therapeutic Exercise:  [x] See flow sheet :   Rationale: increase ROM and increase strength to improve the patients ability to perform ADLs with more ease    15 min Manual Therapy:  STM to c/s paraspinals, SOR, TPR for parascapular muscles   The manual therapy interventions were performed at a separate and distinct time from the therapeutic activities interventions. Rationale: decrease pain, increase ROM, increase tissue extensibility and decrease trigger points to improve pt's tolerance for ADLs    With   [] TE   [] TA   [] neuro   [] other: Patient Education: [x] Review HEP    [] Progressed/Changed HEP based on:   [] positioning   [] body mechanics   [] transfers   [] heat/ice application    [] other:      Other Objective/Functional Measures:     Min soreness with increased reps for shoulder flex & Abd   Appropriate form with all therex    Pain Level (0-10 scale) post treatment: 0/10    ASSESSMENT/Changes in Function: Pt making good progress with improving pain overall and improving tolerance for strengthening therex. Will cont with posture education and progress therex as tolerated. Patient will continue to benefit from skilled PT services to modify and progress therapeutic interventions, address functional mobility deficits, address ROM deficits, address strength deficits, analyze and address soft tissue restrictions, analyze and cue movement patterns, analyze and modify body mechanics/ergonomics, assess and modify postural abnormalities and instruct in home and community integration to attain remaining goals.      [x]  See Plan of Care  []  See progress note/recertification  []  See Discharge Summary         Progress towards goals / Updated goals:  Short Term Goals: To be accomplished in 1 weeks:  1. Patient will demonstrate compliance with HEP in order to improve right shoulder strength for increased ease of ADLs. Current: pt continues to report partial compliance with HEP. 6/16/21     Long Term Goals: To be accomplished in 4 weeks:  1. Patient will improve neck FOTO score by 10 points in order to demonstrate a significant improvement in function. 2. Patient will improve shoulder FOTO score by 5 points in order to demonstrate a significant improvement in function. 3. Patient will report no difficulty with turning to look behind her in order to increase ease of ADLs  4. Patient will improve right cervical rotation AROM to 60 degrees in order to increase ease of driving. Cont to be limited with SNAGs 6-23-21  5.  Patient will improve right shoulder ER MMT to 4/5 in order to increase ease of household chores.     PLAN  [x]  Upgrade activities as tolerated     [x]  Continue plan of care  []  Update interventions per flow sheet       []  Discharge due to:_  []  Other:_      Trevor Rasheed, PT 6/23/2021  8:04 AM    Future Appointments   Date Time Provider Mayela Rahman   6/23/2021 12:45 PM Carmen [facility]   6/25/2021  9:45 AM Valerie Hassan PT [facility]   7/2/2021  1:00 PM MD [facility] Saunders BS AMB   7/8/2021 12:30 PM Terry Caputo MD [facility] AMB

## 2021-06-25 ENCOUNTER — HOSPITAL ENCOUNTER (OUTPATIENT)
Dept: PHYSICAL THERAPY | Age: 76
Discharge: HOME | End: 2021-06-25
Attending: PHYSICAL MEDICINE & REHABILITATION
Payer: MEDICARE

## 2021-06-25 PROCEDURE — 97140 MANUAL THERAPY 1/> REGIONS: CPT

## 2021-06-25 PROCEDURE — 97110 THERAPEUTIC EXERCISES: CPT

## 2021-06-25 NOTE — PROGRESS NOTES
PT DAILY TREATMENT NOTE     Patient Name: Rasta Brooks  Date:2021  : 1945  [x]  Patient  Verified  Payor: [place] MEDICARE / Plan: [place] MEDICARE PART A & B / Product Type: Medicare /    In time:945  Out time:1035  Total Treatment Time (min): 50  Visit #: 5 of 8    Medicare/BCBS Only   Total Timed Codes (min):  50 1:1 Treatment Time:  40       Treatment Area: Neck pain [M54.2]  Pain in right shoulder [M25.511]    SUBJECTIVE  Pain Level (0-10 scale): 0/10  Any medication changes, allergies to medications, adverse drug reactions, diagnosis change, or new procedure performed?: [x] No    [] Yes (see summary sheet for update)  Subjective functional status/changes:   [] No changes reported  Reports no pain, mostly discomfort to her neck and shoulder    OBJECTIVE    Modality rationale: decrease pain to improve the patients ability to ease with adl's   Min Type Additional Details    [] Estim:  []Unatt       []IFC  []Premod                        []Other:  []w/ice   []w/heat  Position:  Location:    [] Estim: []Att    []TENS instruct  []NMES                    []Other:  []w/US   []w/ice   []w/heat  Position:  Location:    []  Traction: [] Cervical       []Lumbar                       [] Prone          []Supine                       []Intermittent   []Continuous Lbs:  [] before manual  [] after manual    []  Ultrasound: []Continuous   [] Pulsed                           []1MHz   []3MHz W/cm2:  Location:    []  Iontophoresis with dexamethasone         Location: [] Take home patch   [] In clinic   10 []  Ice     [x]  heat  []  Ice massage  []  Laser   []  Anodyne Position:seated  Location:right sh    []  Laser with stim  []  Other:  Position:  Location:    []  Vasopneumatic Device    []  Right     []  Left  Pre-treatment girth:  Post-treatment girth:  Measured at (location):  Pressure:       [] lo [] med [] hi   Temperature: [] lo [] med [] hi   [] Skin assessment post-treatment:  []intact []redness- no adverse reaction    []redness - adverse     25 min Therapeutic Exercise:  [] See flow sheet :   Rationale: increase ROM, increase strength, improve coordination and improve balance to improve the patients ability to ease with adl's       15 min Manual Therapy:  STM to c/s paraspinals, SOR, TPR for parascapular muscles   The manual therapy interventions were performed at a separate and distinct time from the therapeutic activities interventions. Rationale: decrease pain, increase ROM, increase tissue extensibility and decrease trigger points to improve pt's tolerance for ADLs         With   [] TE   [] TA   [] neuro   [] other: Patient Education: [x] Review HEP    [] Progressed/Changed HEP based on:   [] positioning   [] body mechanics   [] transfers   [] heat/ice application    [] other:      Other Objective/Functional Measures: Mostly pulling during supine scaption ex. Good tolerance to increased weight/ reps as tolerated   CS AROM right rotation=57 deg/Left rotation=67 deg    Pain Level (0-10 scale) post treatment: 1/10    ASSESSMENT/Changes in Function: Progressing well. Pt reports she feels like she can tolerate moving her neck with decreased pain. Patient will continue to benefit from skilled PT services to modify and progress therapeutic interventions, address functional mobility deficits, address ROM deficits, address strength deficits, analyze and address soft tissue restrictions, analyze and cue movement patterns, analyze and modify body mechanics/ergonomics and assess and modify postural abnormalities to attain remaining goals. []  See Plan of Care  []  See progress note/recertification  []  See Discharge Summary         Progress towards goals / Updated goals:  1. Patient will demonstrate compliance with HEP in order to improve right shoulder strength for increased ease of ADLs. Current: pt continues to report partial compliance with HEP. 6/16/21     Long Term Goals: To be accomplished in 4 weeks:  1. Patient will improve neck FOTO score by 10 points in order to demonstrate a significant improvement in function. 2. Patient will improve shoulder FOTO score by 5 points in order to demonstrate a significant improvement in function. 3. Patient will report no difficulty with turning to look behind her in order to increase ease of ADLs  4. Patient will improve right cervical rotation AROM to 60 degrees in order to increase ease of driving. Progressin21. See above  Cont to be limited with SNAGs -  5.  Patient will improve right shoulder ER MMT to / in order to increase ease of household chores.     PLAN  [x]  Upgrade activities as tolerated     [x]  Continue plan of care  []  Update interventions per flow sheet       []  Discharge due to:_  []  Other:_      Andrea Bradley, PT 2021  9:51 AM    Future Appointments   Date Time Provider Mayela Rahman   2021  1:00 PM Lauren Degroot MD [facility] AMB   [facility]   2021 12:30 PM Barbie Bettencourt MD [facility]   [facility]

## 2021-06-29 ENCOUNTER — HOSPITAL ENCOUNTER (OUTPATIENT)
Dept: PHYSICAL THERAPY | Age: 76
Discharge: HOME | End: 2021-06-29
Attending: PHYSICAL MEDICINE & REHABILITATION
Payer: MEDICARE

## 2021-06-29 PROCEDURE — 97110 THERAPEUTIC EXERCISES: CPT

## 2021-06-29 NOTE — PROGRESS NOTES
PT DAILY TREATMENT NOTE     Patient Name: Colletta Eve  Date:2021  : 1945  [x]  Patient  Verified  Payor: Kiran Shrestha / Plan: [place] MEDICARE PART A & B / Product Type: Medicare /    In time: 3:10  Out time: 3:59  Total Treatment Time (min): 49  Visit #: 5 of 8    Medicare/BCBS Only   Total Timed Codes (min):  39 1:1 Treatment Time:  39       Treatment Area: Neck pain [M54.2]  Pain in right shoulder [M25.511]    SUBJECTIVE  Pain Level (0-10 scale): 0/10  Any medication changes, allergies to medications, adverse drug reactions, diagnosis change, or new procedure performed?: [x] No    [] Yes (see summary sheet for update)  Subjective functional status/changes:   [] No changes reported  Pt reports most difficulty reaching behind her back to put on her bra, lift a bowl set (4-5 bowls stacked) down from the top shelf of the cabinet and turning her head to the right when driving. She states she gets a lot of tightness in the right side of her neck. She works in the garden at home and reads but tends to do household chores on rainy or hot days.        OBJECTIVE    Modality rationale: decrease edema, decrease inflammation, decrease pain and increase tissue extensibility to improve the patients ability to improve tolerance ADLs   Min Type Additional Details    [] Estim:  []Unatt       []IFC  []Premod                        []Other:  []w/ice   []w/heat  Position:  Location:    [] Estim: []Att    []TENS instruct  []NMES                    []Other:  []w/US   []w/ice   []w/heat  Position:  Location:    []  Traction: [] Cervical       []Lumbar                       [] Prone          []Supine                       []Intermittent   []Continuous Lbs:  [] before manual  [] after manual    []  Ultrasound: []Continuous   [] Pulsed                           []1MHz   []3MHz W/cm2:  Location:    []  Iontophoresis with dexamethasone         Location: [] Take home patch   [] In clinic   10 []  Ice     [x]  heat  []  Ice massage  []  Laser   []  Anodyne Position: seated  Location: right shoulder/UT    []  Laser with stim  []  Other:  Position:  Location:    []  Vasopneumatic Device    []  Right     []  Left  Pre-treatment girth:  Post-treatment girth:  Measured at (location):  Pressure:       [] lo [] med [] hi   Temperature: [] lo [] med [] hi   [x] Skin assessment post-treatment:  [x]intact []redness- no adverse reaction    []redness - adverse reaction:     39 min Therapeutic Exercise:  [x] See flow sheet :   Rationale: increase ROM and increase strength to improve the patients ability to perform ADLs with more ease    With   [] TE   [] TA   [] neuro   [] other: Patient Education: [x] Review HEP    [] Progressed/Changed HEP based on:   [] positioning   [] body mechanics   [] transfers   [] heat/ice application    [] other:      Other Objective/Functional Measures: Added open books and scalene stretch to home program  Improved ability to reach behind the back post open books and extension AAROM to improve mobility  Worked in cabinet on pan lifts with 2#-7# working on pushing and using chest muscles versus UT for lifting  Educated on heat for tight muscles versus ice for inflammation; trial of heat today  Reviewed SNAGs and moving towel to address where the tightness is in the neck  Forward head posture with increased upper t/s hypomobility and pectoral tightness    Pain Level (0-10 scale) post treatment: 0/10    ASSESSMENT/Changes in Function: Pt progressing with decreased pain and improving postural awareness. She has improving right shoulder mobility but continues to utilize increased UT with OH reaching and lifting. She has c/s and t/s hypomobility contributing to compensations in the shoulder and neck especially along C6-T1. She has forward head and shoulder posture as well further limiting ease of behind the back reaching such as to don/doff her bra.  She has difficulty/pain with bringing down weight items from cabinets overhead and with turning her head to the right while driving. Patient will continue to benefit from skilled PT services to modify and progress therapeutic interventions, address functional mobility deficits, address ROM deficits, address strength deficits, analyze and address soft tissue restrictions, analyze and cue movement patterns, analyze and modify body mechanics/ergonomics, assess and modify postural abnormalities and instruct in home and community integration to attain remaining goals. [x]  See Plan of Care  []  See progress note/recertification  []  See Discharge Summary         Progress towards goals / Updated goals:  Short Term Goals: To be accomplished in 1 weeks:  1. Patient will demonstrate compliance with HEP in order to improve right shoulder strength for increased ease of ADLs. Current: pt continues to report partial compliance with HEP. 6/16/21     Long Term Goals: To be accomplished in 4 weeks:  1. Patient will improve neck FOTO score by 10 points in order to demonstrate a significant improvement in function. 2. Patient will improve shoulder FOTO score by 5 points in order to demonstrate a significant improvement in function. 3. Patient will report no difficulty with turning to look behind her in order to increase ease of ADLs  4. Patient will improve right cervical rotation AROM to 60 degrees in order to increase ease of driving. Cont to be limited with SNAGs 6-23-21  5.  Patient will improve right shoulder ER MMT to 4/5 in order to increase ease of household chores.     PLAN  [x]  Upgrade activities as tolerated     [x]  Continue plan of care  []  Update interventions per flow sheet       []  Discharge due to:_  []  Other:_      Ramila Sullivan PTA 6/29/2021  8:04 AM    Future Appointments   Date Time Provider Mayela Rahman   6/29/2021  3:00 PM Omid Washburn PTA [facility]   7/2/2021  1:00 PM Yonathan Gomez MD [facility] BS AMB   7/7/2021  4:30 PM Redgie Pass [facility]   7/8/2021 12:30 PM Paula Jiang MD [facility] AMB   7/9/2021 12:45 PM Shane Flores [facility]

## 2021-07-02 ENCOUNTER — HOSPITAL ENCOUNTER (OUTPATIENT)
Dept: PHYSICAL THERAPY | Age: 76
Discharge: HOME | End: 2021-07-02
Attending: PHYSICAL MEDICINE & REHABILITATION
Payer: MEDICARE

## 2021-07-02 ENCOUNTER — OFFICE VISIT (OUTPATIENT)
Dept: ORTHOPEDIC SURGERY | Age: 76
End: 2021-07-02
Payer: MEDICARE

## 2021-07-02 VITALS
HEIGHT: 62 IN | WEIGHT: 210.6 LBS | RESPIRATION RATE: 16 BRPM | HEART RATE: 67 BPM | SYSTOLIC BLOOD PRESSURE: 133 MMHG | DIASTOLIC BLOOD PRESSURE: 85 MMHG | BODY MASS INDEX: 38.76 KG/M2 | OXYGEN SATURATION: 97 % | TEMPERATURE: 97.9 F

## 2021-07-02 DIAGNOSIS — M54.2 NECK PAIN: Primary | ICD-10-CM

## 2021-07-02 DIAGNOSIS — M50.30 DDD (DEGENERATIVE DISC DISEASE), CERVICAL: ICD-10-CM

## 2021-07-02 DIAGNOSIS — M47.812 CERVICAL SPONDYLOSIS WITHOUT MYELOPATHY: ICD-10-CM

## 2021-07-02 DIAGNOSIS — M54.12 CERVICAL NEURITIS: ICD-10-CM

## 2021-07-02 PROCEDURE — 97110 THERAPEUTIC EXERCISES: CPT

## 2021-07-02 PROCEDURE — G9899 SCRN MAM PERF RSLTS DOC: HCPCS | Performed by: PHYSICAL MEDICINE & REHABILITATION

## 2021-07-02 PROCEDURE — 3017F COLORECTAL CA SCREEN DOC REV: CPT | Performed by: PHYSICAL MEDICINE & REHABILITATION

## 2021-07-02 PROCEDURE — 1090F PRES/ABSN URINE INCON ASSESS: CPT | Performed by: PHYSICAL MEDICINE & REHABILITATION

## 2021-07-02 PROCEDURE — G8752 SYS BP LESS 140: HCPCS | Performed by: PHYSICAL MEDICINE & REHABILITATION

## 2021-07-02 PROCEDURE — 99213 OFFICE O/P EST LOW 20 MIN: CPT | Performed by: PHYSICAL MEDICINE & REHABILITATION

## 2021-07-02 PROCEDURE — G8754 DIAS BP LESS 90: HCPCS | Performed by: PHYSICAL MEDICINE & REHABILITATION

## 2021-07-02 PROCEDURE — G8536 NO DOC ELDER MAL SCRN: HCPCS | Performed by: PHYSICAL MEDICINE & REHABILITATION

## 2021-07-02 PROCEDURE — 1101F PT FALLS ASSESS-DOCD LE1/YR: CPT | Performed by: PHYSICAL MEDICINE & REHABILITATION

## 2021-07-02 PROCEDURE — G8399 PT W/DXA RESULTS DOCUMENT: HCPCS | Performed by: PHYSICAL MEDICINE & REHABILITATION

## 2021-07-02 PROCEDURE — G8427 DOCREV CUR MEDS BY ELIG CLIN: HCPCS | Performed by: PHYSICAL MEDICINE & REHABILITATION

## 2021-07-02 PROCEDURE — G8417 CALC BMI ABV UP PARAM F/U: HCPCS | Performed by: PHYSICAL MEDICINE & REHABILITATION

## 2021-07-02 PROCEDURE — G8432 DEP SCR NOT DOC, RNG: HCPCS | Performed by: PHYSICAL MEDICINE & REHABILITATION

## 2021-07-02 NOTE — PROGRESS NOTES
PT DAILY TREATMENT NOTE     Patient Name: Momo Salgado  Date:2021  : 1945  [x]  Patient  Verified  Payor: [place] MEDICARE / Plan: [place] MEDICARE PART A & B / Product Type: Medicare /    In time:11:15  Out time:12:05  Total Treatment Time (min): 50  Visit #: 6 of 8    Medicare/BCBS Only   Total Timed Codes (min):  40 1:1 Treatment Time:  40       Treatment Area: Neck pain [M54.2]  Pain in right shoulder [M25.511]    SUBJECTIVE  Pain Level (0-10 scale): 0  Any medication changes, allergies to medications, adverse drug reactions, diagnosis change, or new procedure performed?: [x] No    [] Yes (see summary sheet for update)  Subjective functional status/changes:   [] No changes reported  Pt reports feeling like she is getting better, just feels tired today    OBJECTIVE    Modality rationale: decrease pain to improve the patients ability to perform daily tasks   Min Type Additional Details    [] Estim:  []Unatt       []IFC  []Premod                        []Other:  []w/ice   []w/heat  Position:  Location:    [] Estim: []Att    []TENS instruct  []NMES                    []Other:  []w/US   []w/ice   []w/heat  Position:  Location:    []  Traction: [] Cervical       []Lumbar                       [] Prone          []Supine                       []Intermittent   []Continuous Lbs:  [] before manual  [] after manual    []  Ultrasound: []Continuous   [] Pulsed                           []1MHz   []3MHz W/cm2:  Location:    []  Iontophoresis with dexamethasone         Location: [] Take home patch   [] In clinic   10 []  Ice     [x]  heat  []  Ice massage  []  Laser   []  Anodyne Position: seated  Location: right shoulder    []  Laser with stim  []  Other:  Position:  Location:    []  Vasopneumatic Device    []  Right     []  Left  Pre-treatment girth:  Post-treatment girth:  Measured at (location):  Pressure:       [] lo [] med [] hi   Temperature: [] lo [] med [] hi   [] Skin assessment post-treatment:  []intact []redness- no adverse reaction    []redness - adverse reaction:     40 min Therapeutic Exercise:  [x] See flow sheet :   Rationale: increase ROM and increase strength to improve the patients ability to perform ADLs           With   [] TE   [] TA   [] neuro   [] other: Patient Education: [x] Review HEP    [] Progressed/Changed HEP based on:   [] positioning   [] body mechanics   [] transfers   [] heat/ice application    [] other:      Other Objective/Functional Measures:   C/s rot to right 65*      Pain Level (0-10 scale) post treatment: 0    ASSESSMENT/Changes in Function: Demo's improved c/s rot to right with no c/o pain, just some ms tension. Pt fatigued following therex but denies pain. Min vc's to avoid shoulder hike compensation. Patient will continue to benefit from skilled PT services to modify and progress therapeutic interventions, address functional mobility deficits, address ROM deficits, address strength deficits, analyze and address soft tissue restrictions, analyze and cue movement patterns, analyze and modify body mechanics/ergonomics and assess and modify postural abnormalities to attain remaining goals. []  See Plan of Care  []  See progress note/recertification  []  See Discharge Summary         Progress towards goals / Updated goals:  1. Patient will demonstrate compliance with HEP in order to improve right shoulder strength for increased ease of ADLs. Current: pt continues to report partial compliance with HEP. 6/16/21     Long Term Goals: To be accomplished in 4 weeks:  1. Patient will improve neck FOTO score by 10 points in order to demonstrate a significant improvement in function. 2. Patient will improve shoulder FOTO score by 5 points in order to demonstrate a significant improvement in function. 3. Patient will report no difficulty with turning to look behind her in order to increase ease of ADLs  4.  Patient will improve right cervical rotation AROM to 60 degrees in order to increase ease of driving.   Met: C/s rot to right 65* void of pain 7/2/2021  5.  Patient will improve right shoulder ER MMT to 4/5 in order to increase ease of household chores.     PLAN  []  Upgrade activities as tolerated     [x]  Continue plan of care  []  Update interventions per flow sheet       []  Discharge due to:_  []  Other:_      Monae Veronica Hurst, PTA 7/2/2021  11:16 AM    Future Appointments   Date Time Provider Mayela Rahman   7/2/2021  1:00 PM Veena Ruff MD [facility] BS AMB   7/7/2021  4:30 PM Gabriel Raphael [facility]   7/8/2021 12:30 PM Francyne Kocher, MD [facility] AMB   7/9/2021 12:45 PM Gabriel Raphael [facility]

## 2021-07-02 NOTE — PROGRESS NOTES
[facility]  [address], [place]   Phone: [phone]  Fax: [phone]        PROGRESS NOTE      HISTORY OF PRESENT ILLNESS:  The patient is a 77 y.o. female and was seen today for follow up of right-sided neck and trapezius pain x 1 month. She denies radicular symptoms. Her pain is exacerbated by reaching behind and overhead activity. Pt reports remote h/o right shoulder dislocation without surgical intervention. She has treated with muscle relaxants and Naprosyn with some benefit. Pt received a right trapezius trigger point injection with minimal benefit. Pt has been referred to PT but has not attended. Patient denies previous spinal surgery, injections, or physical therapy/chiropractic care. Pt denies dropping things or loss of balance. Pt reports a 4 Ib weight loss x 1 month. Pt denies fever or skin changes. The patient is RHD. PmHx of gastroenteritis, HTN, obesity. Note from Dr. Lakia Welch dated 5/10/2021 indicating patient was seen with c/o right shoulder and neck pain x 2 weeks. No recent injuries. Treated with naprosyn and robaxin. Pt received a right trapezius trigger point injection. Referred to spine. Cervical spine plain films dated 5/21/2021. 2 views: AP and lateral. Revealed: Mild disc space narrowing at C5-6. Moderate disc space narrowing at C6-7. Small anterior osteophytes noted at C5 and C6. No acute pathology identified. At her last clinical appointment, I referred her to physical therapy with an emphasis on HEP. I started her on Medrol Dosepak followed by Naprosyn 500 mg BID.       The patient returns today pain free. She rates her pain 0/10, previously 2/10. Therapy notes reviewed. Pt is currently enrolled in PT with benefit. She completed the MDP with benefit. Pt denies dropping things or loss of balance.  reviewed. Body mass index is 38.52 kg/m².     PCP: Kym Dyer MD      Past Medical History:   Diagnosis Date    Breast mass right,  excisional biopsy 1/15,  atypical ductal hyperplasia ([facility] 1/15)    Cardiac echocardiogram 12/02/2016    Ltd study to evaluate LA size. EF 65%. No RWMA. LA size was upper limits of normal (decreased in size since prev study of 9/23/16). Mild MR.      Colon polyps     Hemorrhoids         Social History     Socioeconomic History    Marital status:      Spouse name: Not on file    Number of children: Not on file    Years of education: Not on file    Highest education level: Not on file   Occupational History    Not on file   Tobacco Use    Smoking status: Former Smoker     Years: 13.00    Smokeless tobacco: Never Used    Tobacco comment: occasional, stopped at age 37   Substance and Sexual Activity    Alcohol use: No    Drug use: No    Sexual activity: Yes     Partners: Male   Other Topics Concern    Not on file   Social History Narrative    Not on file     Social Determinants of Health     Financial Resource Strain:     Difficulty of Paying Living Expenses:    Food Insecurity:     Worried About [address] [employer] in the Last Year:     [address] [facility] [employer] in the Last Year:    Transportation Needs:     Lack of Transportation (Medical):  Lack of Transportation (Non-Medical):    Physical Activity:     Days of Exercise per Week:     Minutes of Exercise per Session:    Stress:     Feeling of Stress :    Social Connections:     Frequency of Communication with Friends and Family:     Frequency of Social Gatherings with Friends and Family:     Attends Muslim Services:     Active Member of Clubs or Organizations:     Attends Club or Organization Meetings:     Marital Status:    Intimate Partner Violence:     Fear of Current or Ex-Partner:     Emotionally Abused:     Physically Abused:     Sexually Abused:        Current Outpatient Medications   Medication Sig Dispense Refill    aspirin delayed-release (ADULT LOW DOSE ASPIRIN) 81 mg tablet Take 1 Tab by mouth daily.  27 Tab prn    cholecalciferol (VITAMIN D3) 1,000 unit cap Take 1,000 Units by mouth every other day.  methylPREDNISolone (MEDROL DOSEPACK) 4 mg tablet Per dose pack instructions (Patient not taking: Reported on 7/2/2021) 1 Dose Pack 0    naproxen (NAPROSYN) 500 mg tablet Take 1 Tablet by mouth two (2) times daily (with meals). (Patient not taking: Reported on 7/2/2021) 30 Tablet 0    prednisoLONE acetate (PRED FORTE) 1 % ophthalmic suspension instill 1 drop into right eye four times a day for 1 week then th. ..  (REFER TO PRESCRIPTION NOTES). (Patient not taking: Reported on 7/2/2021)         No Known Allergies       PHYSICAL EXAMINATION    Visit Vitals  /85 (BP 1 Location: Left upper arm, BP Patient Position: Sitting)   Pulse 67   Temp 97.9 °F (36.6 °C) (Temporal)   Resp 16   Ht 5' 2\" (1.575 m)   Wt 210 lb 9.6 oz (95.5 kg)   SpO2 97%   BMI 38.52 kg/m²       CONSTITUTIONAL: NAD, A&O x 3  SENSATION: Intact to light touch throughout  NEURO: Alfonso's is negative bilaterally. RANGE OF MOTION: The patient has full passive range of motion in all four extremities. Shoulder AB/Flex Elbow Flex Wrist Ext Elbow Ext Wrist Flex Hand Intrin Tone   Right +4/5 +4/5 +4/5 +4/5 +4/5 +4/5 +4/5   Left +4/5 +4/5 +4/5 +4/5 +4/5 +4/5 +4/5               ASSESSMENT   Diagnoses and all orders for this visit:    1. Neck pain    2. Cervical spondylosis without myelopathy    3. Cervical neuritis    4. DDD (degenerative disc disease), cervical      IMPRESSION AND PLAN:  Patient returns to the office today pain free. Multiple treatment options were discussed. She should continue with PT as prescribed and perform her HEP once completed. Patient is neurologically intact. I will see the patient back prn. Written by Antonio Mccartney and Jarrett Romero, as dictated by Mychal Napier MD  I examined the patient, reviewed and agree with the note.

## 2021-07-02 NOTE — PROGRESS NOTES
Isidro Underwood presents today for   Chief Complaint   Patient presents with    Follow-up       Is someone accompanying this pt? no    Is the patient using any DME equipment during OV? no    Coordination of Care:  1. Have you been to the ER, urgent care clinic since your last visit? Hospitalized since your last visit? no    2. Have you seen or consulted any other health care providers outside of the [address] [place] [address] since your last visit? Include any pap smears or colon screening.  no

## 2021-07-02 NOTE — LETTER
7/2/2021    Patient: Layo Jason   YOB: 1945   Date of Visit: 7/2/2021     Sheri Sim MD  [id number]  [phone]  [facility]    Dear Sheri Sim MD,      Thank you for referring Ms. Bambi Phillips to [address] for evaluation. My notes for this consultation are attached. If you have questions, please do not hesitate to call me. I look forward to following your patient along with you.       Sincerely,    Petty Brandt MD

## 2021-07-07 ENCOUNTER — HOSPITAL ENCOUNTER (OUTPATIENT)
Dept: PHYSICAL THERAPY | Age: 76
Discharge: HOME | End: 2021-07-07
Attending: PHYSICAL MEDICINE & REHABILITATION
Payer: MEDICARE

## 2021-07-07 PROCEDURE — 97110 THERAPEUTIC EXERCISES: CPT

## 2021-07-07 NOTE — PROGRESS NOTES
PT DAILY TREATMENT NOTE     Patient Name: John Cuevas  Date:2021  : 1945  [x]  Patient  Verified  Payor: [place] MEDICARE / Plan: [place] MEDICARE PART A & B / Product Type: Medicare /    In time: 4:30  Out time: 5:24  Total Treatment Time (min): 54  Visit #: 7 of 8    Medicare/BCBS Only   Total Timed Codes (min):  44 1:1 Treatment Time:  40       Treatment Area: Neck pain [M54.2]  Pain in right shoulder [M25.511]    SUBJECTIVE  Pain Level (0-10 scale): 1-2/10  Any medication changes, allergies to medications, adverse drug reactions, diagnosis change, or new procedure performed?: [x] No    [] Yes (see summary sheet for update)  Subjective functional status/changes:   [] No changes reported  Pt reports having a little more soreness along her Right UT today. She feels 50% improvement overall.     OBJECTIVE    Modality rationale: decrease pain and increase tissue extensibility to improve the patients ability to tolerate ADLs   Min Type Additional Details    [] Estim:  []Unatt       []IFC  []Premod                        []Other:  []w/ice   []w/heat  Position:  Location:    [] Estim: []Att    []TENS instruct  []NMES                    []Other:  []w/US   []w/ice   []w/heat  Position:  Location:    []  Traction: [] Cervical       []Lumbar                       [] Prone          []Supine                       []Intermittent   []Continuous Lbs:  [] before manual  [] after manual    []  Ultrasound: []Continuous   [] Pulsed                           []1MHz   []3MHz W/cm2:  Location:    []  Iontophoresis with dexamethasone         Location: [] Take home patch   [] In clinic   10 []  Ice     [x]  heat  []  Ice massage  []  Laser   []  Anodyne Position: seated  Location: Right shoulder    []  Laser with stim  []  Other:  Position:  Location:    []  Vasopneumatic Device    []  Right     []  Left  Pre-treatment girth:  Post-treatment girth:  Measured at (location):  Pressure:       [] lo [] med [] hi   Temperature: [] lo [] med [] hi   [x] Skin assessment post-treatment:  [x]intact []redness- no adverse reaction    []redness - adverse reaction:       44 min Therapeutic Exercise:  [x] See flow sheet :   Rationale: increase ROM and increase strength to improve the patients ability to perform ADLs with more ease          With   [] TE   [] TA   [] neuro   [] other: Patient Education: [x] Review HEP    [] Progressed/Changed HEP based on:   [] positioning   [] body mechanics   [] transfers   [] heat/ice application    [] other:      Other Objective/Functional Measures:    Right ER MMT: 3+/5    Unable to tolerate same resistance with shoulder ER using KZ    Mod challenged with min increased weight for shoulder abd & ER in s/l    Pain Level (0-10 scale) post treatment: 0/10    ASSESSMENT/Changes in Function: see Progress Note please      Patient will continue to benefit from skilled PT services to modify and progress therapeutic interventions, address functional mobility deficits, address ROM deficits, address strength deficits, analyze and address soft tissue restrictions, analyze and cue movement patterns, analyze and modify body mechanics/ergonomics, assess and modify postural abnormalities and instruct in home and community integration to attain remaining goals. []  See Plan of Care  [x]  See progress note/recertification  []  See Discharge Summary         Progress towards goals / Updated goals:  1. Patient will demonstrate compliance with HEP in order to improve right shoulder strength for increased ease of ADLs. Current: pt continues to report partial compliance with HEP. 6/16/21     Long Term Goals: To be accomplished in 4 weeks:  1. Patient will improve neck FOTO score by 10 points in order to demonstrate a significant improvement in function. No change 7-7-21    2. Patient will improve shoulder FOTO score by 5 points in order to demonstrate a significant improvement in function. Regressed 1 points 7-7-21    3.  Patient will report no difficulty with turning to look behind her in order to increase ease of ADLs. Met 7-7-21    4. Patient will improve right cervical rotation AROM to 60 degrees in order to increase ease of driving.   Met: C/s rot to right 65* void of pain 7/2/2021    5.  Patient will improve right shoulder ER MMT to 4/5 in order to increase ease of household chores. not met 3+/5 7-7-21    PLAN  [x]  Upgrade activities as tolerated     [x]  Continue plan of care  []  Update interventions per flow sheet       []  Discharge due to:_  []  Other:_      Tessa Allen, PT 7/7/2021  8:03 AM    Future Appointments   Date Time Provider Mayela Rahman   7/7/2021  4:30 PM Clifford GARCIANNMICAH [facility]   7/8/2021 12:30 PM Margarito Teran MD [facility]   7/9/2021 12:45 PM Clifford Jimenez [id number] [facility]

## 2021-07-07 NOTE — PROGRESS NOTES
[facility] [employer] [facility] [employer]  [address] [facility]  [phone] fax    Continued Plan of Care/ Re-certification for Physical Therapy Services    Patient name: Hank Richey Start of Care: 2021   Referral source: Lavelle Conway MD : 1945                Medical Diagnosis: Neck pain [M54.2]  Pain in right shoulder [M25.511]  Payor: VA MEDICARE / Plan: VA MEDICARE PART A & B / Product Type: Medicare /  Onset Date: a month ago                Treatment Diagnosis:  Neck and right shoulder pain   Prior Hospitalization: see medical history Provider#: [id number]   Medications: Verified on Patient summary List    Comorbidities: none reported   Prior Level of Function: right hand dominant, Ind with ADLs     Visits from Start of Care: 8    Missed Visits: 0    The Plan of Care and following information is based on the patient's current status:  Tamara Cheese will demonstrate compliance with HEP in order to improve right shoulder strength for increased ease of ADLs. Status at last note/certification: pt continues to report partial compliance with HEP. 21  Current Status: not met    Goal: Patient will improve neck FOTO score by 10 points in order to demonstrate a significant improvement in function. Status at last note/certification: No change 21  Current Status: not met    Goal: Patient will improve shoulder FOTO score by 5 points in order to demonstrate a significant improvement in function. Status at last note/certification: Regressed 1 points 21  Current Status: not met    Goal: Patient will report no difficulty with turning to look behind her in order to increase ease of ADLs.    Status at last note/certification: Met 8-0[birth year]44  Current Status: met    Goal: Patient will improve right cervical rotation AROM to 60 degrees in order to increase ease of driving.   Status at last note/certification: Met: C/s rot to right 65* void of pain 7/2/2021  Status at discharge: met    Goal: Patient will improve right shoulder ER MMT to 4/5 in order to increase ease of household chores. Status at last note/certification: not met 3+/5 7-7-21  Status at discharge: not met    Key functional changes: improving pain, improving AROM of c/s, improving ease with ADLs      Problems/ barriers to goal attainment: decreased strength of Right shoulder girdle, difficulty with lifting OH     Problem List: pain affecting function, decrease strength, edema affecting function, decrease ADL/ functional abilitiies, decrease activity tolerance and decrease flexibility/ joint mobility    Treatment Plan: Therapeutic exercise, Therapeutic activities, Neuromuscular re-education, Physical agent/modality, Manual therapy, Patient education, Self Care training and Functional mobility training     Patient Goal (s) has been updated and includes: improve pain and use Right arm more     Goals for this certification period to be accomplished in 4 weeks:  Goal: Patient will demonstrate compliance with HEP in order to improve right shoulder strength for increased ease of ADLs. Status at last note/certification: pt continues to report partial compliance with HEP. 6/16/21    Goal: Patient will improve neck FOTO score by 10 points in order to demonstrate a significant improvement in function. Status at last note/certification: No change 7-7-21    Goal: Patient will improve shoulder FOTO score by 5 points in order to demonstrate a significant improvement in function. Status at last note/certification: Regressed 1 points 7-7-21    Goal: Patient will improve right shoulder ER MMT to 4/5 in order to increase ease of household chores. Status at last note/certification: not met 3+/5 7-7-21    Frequency / Duration: Patient to be seen 2 times per week for 4 weeks:    Assessment / Recommendations:Pt making gradual progress with PT, reporting 50% improvement overall.  She present with improved AROM of her neck, improved ease with turning her head and improving tolerance for strengthening therex. Pt cont to be limited with decreased shoulder girdle strength, difficulty with OH lifting activities, tightness and soreness of Right UT. Pt would cont to benefit from skilled PT to address remained limitations to improve her QOL. Certification Period: 7-4-2021 to 8-2-2021    Allen Garretttori, PT 7/7/2021 7:12 PM    ________________________________________________________________________  I certify that the above Therapy Services are being furnished while the patient is under my care. I agree with the treatment plan and certify that this therapy is necessary. [] I have read the above and request that my patient continue as recommended.   [] I have read the above report and request that my patient continue therapy with the following changes/special instructions: _______________________________________  [] I have read the above report and request that my patient be discharged from therapy    Physician's Signature:____________Date:_________TIME:________     Waylon Cooley MD  ** Signature, Date and Time must be completed for valid certification **    Please sign and return to [facility] [employer] [facility] [employer]  [address] [facility]  [phone] fax

## 2021-07-09 ENCOUNTER — APPOINTMENT (OUTPATIENT)
Dept: PHYSICAL THERAPY | Age: 76
End: 2021-07-09
Attending: PHYSICAL MEDICINE & REHABILITATION
Payer: MEDICARE

## 2021-07-13 ENCOUNTER — HOSPITAL ENCOUNTER (OUTPATIENT)
Dept: PHYSICAL THERAPY | Age: 76
Discharge: HOME | End: 2021-07-13
Attending: PHYSICAL MEDICINE & REHABILITATION
Payer: MEDICARE

## 2021-07-13 PROCEDURE — 97110 THERAPEUTIC EXERCISES: CPT

## 2021-07-13 NOTE — PROGRESS NOTES
PT DAILY TREATMENT NOTE     Patient Name: Devendra Guadarrama  Date:2021  : 1945  [x]  Patient  Verified  Payor: Severiano Quinton / Plan: VA MEDICARE PART A & B / Product Type: Medicare /    In time: 11:17  Out time: 12:10  Total Treatment Time (min): 53  Visit #: 1 of 8    Medicare/BCBS Only   Total Timed Codes (min):  43 1:1 Treatment Time:  43       Treatment Area: Neck pain [M54.2]  Pain in right shoulder [M25.511]    SUBJECTIVE  Pain Level (0-10 scale): 0/10  Any medication changes, allergies to medications, adverse drug reactions, diagnosis change, or new procedure performed?: [x] No    [] Yes (see summary sheet for update)  Subjective functional status/changes:   [] No changes reported  Pt reports doing ok overall.     OBJECTIVE    Modality rationale: decrease pain and increase tissue extensibility to improve the patients ability to tolerate ADLs/amb   Min Type Additional Details    [] Estim:  []Unatt       []IFC  []Premod                        []Other:  []w/ice   []w/heat  Position:  Location:    [] Estim: []Att    []TENS instruct  []NMES                    []Other:  []w/US   []w/ice   []w/heat  Position:  Location:    []  Traction: [] Cervical       []Lumbar                       [] Prone          []Supine                       []Intermittent   []Continuous Lbs:  [] before manual  [] after manual    []  Ultrasound: []Continuous   [] Pulsed                           []1MHz   []3MHz W/cm2:  Location:    []  Iontophoresis with dexamethasone         Location: [] Take home patch   [] In clinic   10 []  Ice     [x]  heat  []  Ice massage  []  Laser   []  Anodyne Position: seated  Location: Right shoulder    []  Laser with stim  []  Other:  Position:  Location:    []  Vasopneumatic Device    []  Right     []  Left  Pre-treatment girth:  Post-treatment girth:  Measured at (location):  Pressure:       [] lo [] med [] hi   Temperature: [] lo [] med [] hi   [x] Skin assessment post-treatment:  [x]intact []redness- no adverse reaction    []redness - adverse reaction:       43 min Therapeutic Exercise:  [x] See flow sheet :   Rationale: increase ROM and increase strength to improve the patients ability to amb and perform ADLs with more ease          With   [] TE   [] TA   [] neuro   [] other: Patient Education: [x] Review HEP    [] Progressed/Changed HEP based on:   [] positioning   [] body mechanics   [] transfers   [] heat/ice application    [] other:      Other Objective/Functional Measures:    Min challenged with increased weight for SA punch    Cont to be challenged with shoulder ER using KZ and in s/l with 2#   Good form with therex     Pain Level (0-10 scale) post treatment: 0/10    ASSESSMENT/Changes in Function: pt making good progress with improving pain but cont to be challenged with decreased strength of Right shoulder. Will cont to progress with strengthening and stability activities as tolerated. Patient will continue to benefit from skilled PT services to modify and progress therapeutic interventions, address functional mobility deficits, address ROM deficits, address strength deficits, analyze and address soft tissue restrictions, analyze and cue movement patterns, analyze and modify body mechanics/ergonomics, assess and modify postural abnormalities, address imbalance/dizziness and instruct in home and community integration to attain remaining goals. []  See Plan of Care  [x]  See progress note/recertification  []  See Discharge Summary         Progress towards goals / Updated goals:  Goals for this certification period to be accomplished in 4 weeks:  Goal: Patient will demonstrate compliance with HEP in order to improve right shoulder strength for increased ease of ADLs.   Status at last note/certification: pt continues to report partial compliance with HEP. 6/16/21     Goal: Patient will improve neck FOTO score by 10 points in order to demonstrate a significant improvement in function.   Status at last note/certification: No change 21     Goal: Patient will improve shoulder FOTO score by 5 points in order to demonstrate a significant improvement in function.   Status at last note/certification: Regressed 1 points 21     Goal: Patient will improve right shoulder ER MMT to 4/5 in order to increase ease of household chores.   Status at last note/certification: not met 3+/5 21  Current: no significant change 21    PLAN  [x]  Upgrade activities as tolerated     [x]  Continue plan of care  []  Update interventions per flow sheet       []  Discharge due to:_  []  Other:_      Pretty Maier, PT 2021  9:31 AM    Future Appointments   Date Time Provider Mayela Rahman   2021 11:15 AM Sivakumar Thibodeaux LNDSVIW [facility]

## 2021-07-14 ENCOUNTER — HOSPITAL ENCOUNTER (OUTPATIENT)
Dept: PHYSICAL THERAPY | Age: 76
Discharge: HOME | End: 2021-07-14
Attending: PHYSICAL MEDICINE & REHABILITATION
Payer: MEDICARE

## 2021-07-14 PROCEDURE — 97110 THERAPEUTIC EXERCISES: CPT

## 2021-07-14 NOTE — PROGRESS NOTES
PT DAILY TREATMENT NOTE     Patient Name: Дмитрий Salter  Date:2021  : 1945  [x]  Patient  Verified  Payor: Jennifer Shaver / Plan: [place] MEDICARE PART A & B / Product Type: Medicare /    In time: 11:15  Out time: 12:10  Total Treatment Time (min): 55  Visit #: 2 of 8    Medicare/BCBS Only   Total Timed Codes (min):  45 1:1 Treatment Time:  45       Treatment Area: Neck pain [M54.2]  Pain in right shoulder [M25.511]    SUBJECTIVE  Pain Level (0-10 scale): 0/10  Any medication changes, allergies to medications, adverse drug reactions, diagnosis change, or new procedure performed?: [x] No    [] Yes (see summary sheet for update)  Subjective functional status/changes:   [] No changes reported  Pt reports doing ok overall. She states still difficulty with OH reaching and reaching behind her back. She gets some pulling in her neck on the right at times.      OBJECTIVE    Modality rationale: decrease pain and increase tissue extensibility to improve the patients ability to tolerate ADLs/amb   Min Type Additional Details    [] Estim:  []Unatt       []IFC  []Premod                        []Other:  []w/ice   []w/heat  Position:  Location:    [] Estim: []Att    []TENS instruct  []NMES                    []Other:  []w/US   []w/ice   []w/heat  Position:  Location:    []  Traction: [] Cervical       []Lumbar                       [] Prone          []Supine                       []Intermittent   []Continuous Lbs:  [] before manual  [] after manual    []  Ultrasound: []Continuous   [] Pulsed                           []1MHz   []3MHz W/cm2:  Location:    []  Iontophoresis with dexamethasone         Location: [] Take home patch   [] In clinic   10 []  Ice     [x]  heat  []  Ice massage  []  Laser   []  Anodyne Position: seated  Location: Right shoulder    []  Laser with stim  []  Other:  Position:  Location:    []  Vasopneumatic Device    []  Right     []  Left  Pre-treatment girth:  Post-treatment girth:  Measured at (location):  Pressure:       [] lo [] med [] hi   Temperature: [] lo [] med [] hi   [x] Skin assessment post-treatment:  [x]intact []redness- no adverse reaction    []redness - adverse reaction:       45 min Therapeutic Exercise:  [x] See flow sheet :   Rationale: increase ROM and increase strength to improve the patients ability to amb and perform ADLs with more ease          With   [] TE   [] TA   [] neuro   [] other: Patient Education: [x] Review HEP    [] Progressed/Changed HEP based on:   [] positioning   [] body mechanics   [] transfers   [] heat/ice application    [] other:      Other Objective/Functional Measures:   Reviewed open books for t/s mobility  Provided BTB for rows at home  Educated on self MET for c/s rotation to correct a left rotation and improve right neck musculature  Educated on towel IR stretch to progress final functional IR for donning bra  Worked on dumbbell to cabinet lifts and reaches; educated to practice with a can at home for strength conditioning  Review of pushing versus lifting with OH reaching to reduce UT compensation    Pain Level (0-10 scale) post treatment: 1/10    ASSESSMENT/Changes in Function: Pt progressing with improving functional IR. She still lacks flexion strength for ease of reaching and lifting into her kitchen cabinets. She continues with left c/s rotation causing right neck muscle tightness. She has early UT firing with OH reaching causing likely continued tightness as well from compensations. Will continue with strength progression and t/s mobility and posture for reduced pain.      Patient will continue to benefit from skilled PT services to modify and progress therapeutic interventions, address functional mobility deficits, address ROM deficits, address strength deficits, analyze and address soft tissue restrictions, analyze and cue movement patterns, analyze and modify body mechanics/ergonomics, assess and modify postural abnormalities, address imbalance/dizziness and instruct in home and community integration to attain remaining goals. []  See Plan of Care  [x]  See progress note/recertification  []  See Discharge Summary         Progress towards goals / Updated goals:  Goals for this certification period to be accomplished in 4 weeks:  Goal: Patient will demonstrate compliance with HEP in order to improve right shoulder strength for increased ease of ADLs. Status at last note/certification: pt continues to report partial compliance with HEP. 6/16/21     Goal: Patient will improve neck FOTO score by 10 points in order to demonstrate a significant improvement in function.   Status at last note/certification: No change 7-7-21     Goal: Patient will improve shoulder FOTO score by 5 points in order to demonstrate a significant improvement in function.   Status at last note/certification: Regressed 1 points 7-7-21     Goal: Patient will improve right shoulder ER MMT to 4/5 in order to increase ease of household chores.   Status at last note/certification: not met 3+/5 7-7-21  Current: no significant change 7-13-21    PLAN  [x]  Upgrade activities as tolerated     [x]  Continue plan of care  []  Update interventions per flow sheet       []  Discharge due to:_  []  Other:_      Ana Maria Hearing, PTA 7/14/2021  9:31 AM    Future Appointments   Date Time Provider Mayela Rahman   7/20/2021  2:15 PM [facility]   7/21/2021  1:30 PM Romeo Lan [facility]   7/27/2021 12:45 PM Mikayla Ventura PT [facility]   7/29/2021  8:15 AM Erica Martinez PT [facility]

## 2021-07-20 ENCOUNTER — APPOINTMENT (OUTPATIENT)
Dept: PHYSICAL THERAPY | Age: 76
End: 2021-07-20
Attending: PHYSICAL MEDICINE & REHABILITATION
Payer: MEDICARE

## 2021-07-21 ENCOUNTER — HOSPITAL ENCOUNTER (OUTPATIENT)
Dept: PHYSICAL THERAPY | Age: 76
Discharge: HOME | End: 2021-07-21
Attending: PHYSICAL MEDICINE & REHABILITATION
Payer: MEDICARE

## 2021-07-21 PROCEDURE — 97110 THERAPEUTIC EXERCISES: CPT

## 2021-07-21 NOTE — PROGRESS NOTES
PT DAILY TREATMENT NOTE     Patient Name: Momo Salgado  Date:2021  : 1945  [x]  Patient  Verified  Payor: [place] MEDICARE / Plan: VA MEDICARE PART A & B / Product Type: Medicare /    In time:1245  Out time:135  Total Treatment Time (min): 50  Visit #: 3 of 8    Medicare/BCBS Only   Total Timed Codes (min):  50 1:1 Treatment Time:  40       Treatment Area: Neck pain [M54.2]  Pain in right shoulder [M25.511]    SUBJECTIVE  Pain Level (0-10 scale): 0/10  Any medication changes, allergies to medications, adverse drug reactions, diagnosis change, or new procedure performed?: [x] No    [] Yes (see summary sheet for update)  Subjective functional status/changes:   [] No changes reported  Pt reports she has most difficulty with reaching behind her front passenger seat and getting a weighted object from overhead    OBJECTIVE    Modality rationale: decrease pain to improve the patients ability to ease with adl's   Min Type Additional Details    [] Estim:  []Unatt       []IFC  []Premod                        []Other:  []w/ice   []w/heat  Position:  Location:    [] Estim: []Att    []TENS instruct  []NMES                    []Other:  []w/US   []w/ice   []w/heat  Position:  Location:    []  Traction: [] Cervical       []Lumbar                       [] Prone          []Supine                       []Intermittent   []Continuous Lbs:  [] before manual  [] after manual    []  Ultrasound: []Continuous   [] Pulsed                           []1MHz   []3MHz W/cm2:  Location:    []  Iontophoresis with dexamethasone         Location: [] Take home patch   [] In clinic   10 []  Ice     [x]  heat  []  Ice massage  []  Laser   []  Anodyne Position: seated  Location:right shoul    []  Laser with stim  []  Other:  Position:  Location:    []  Vasopneumatic Device    []  Right     []  Left  Pre-treatment girth:  Post-treatment girth:  Measured at (location):  Pressure:       [] lo [] med [] hi   Temperature: [] lo [] med [] hi [] Skin assessment post-treatment:  []intact []redness- no adverse reaction    []redness - adverse   40 min Therapeutic Exercise:  [] See flow sheet :   Rationale: increase ROM, increase strength, improve coordination and improve balance to improve the patients ability to ease with adl's        With   [] TE   [] TA   [] neuro   [] other: Patient Education: [x] Review HEP    [] Progressed/Changed HEP based on:   [] positioning   [] body mechanics   [] transfers   [] heat/ice application    [] other:      Other Objective/Functional Measures: CS ROM =69 deg right rotation, 70 deg left rotation  Pt had shoulder fatigue during 2 # weight overhead cabinet reach. Pain Level (0-10 scale) post treatment: 0/10    ASSESSMENT/Changes in Function: Progressing well. Pt is able to reach up to her bra strap during towel stretch. Doing her HEP for MET per last Therapist.     Patient will continue to benefit from skilled PT services to modify and progress therapeutic interventions, address functional mobility deficits, address ROM deficits, address strength deficits, analyze and address soft tissue restrictions, analyze and cue movement patterns, analyze and modify body mechanics/ergonomics, assess and modify postural abnormalities and address imbalance/dizziness to attain remaining goals. []  See Plan of Care  []  See progress note/recertification  []  See Discharge Summary         Progress towards goals / Updated goals:  Goals for this certification period to be accomplished in 4 weeks:  Goal: Patient will demonstrate compliance with HEP in order to improve right shoulder strength for increased ease of ADLs.   Status at last note/certification: pt continues to report partial compliance with HEP. 6/16/21     Goal: Patient will improve neck FOTO score by 10 points in order to demonstrate a significant improvement in function.   Status at last note/certification: No change 7-7-21     Goal: Patient will improve shoulder FOTO score by 5 points in order to demonstrate a significant improvement in function.   Status at last note/certification: Regressed 1 points 7-7-21     Goal: Patient will improve right shoulder ER MMT to 4/5 in order to increase ease of household chores.   Status at last note/certification: not met 3+/5 7-7-21  Current: no significant change 7-13-21    PLAN  [x]  Upgrade activities as tolerated     [x]  Continue plan of care  []  Update interventions per flow sheet       []  Discharge due to:_  []  Other:_      Sky Smith, PT 7/21/2021  11:15 AM    Future Appointments   Date Time Provider Mayela Rahman   7/21/2021 12:45 PM Juan Hong PT [facility]   7/27/2021 12:45 PM Juan Hong PT [facility]   7/29/2021  8:15 AM Tabitha Hernandez PT [facility]

## 2021-07-27 ENCOUNTER — HOSPITAL ENCOUNTER (OUTPATIENT)
Dept: PHYSICAL THERAPY | Age: 76
Discharge: HOME | End: 2021-07-27
Attending: PHYSICAL MEDICINE & REHABILITATION
Payer: MEDICARE

## 2021-07-27 PROCEDURE — 97110 THERAPEUTIC EXERCISES: CPT

## 2021-07-27 NOTE — PROGRESS NOTES
PT DAILY TREATMENT NOTE     Patient Name: John Cuevas  Date:2021  : 1945  [x]  Patient  Verified  Payor: VA MEDICARE / Plan: VA MEDICARE PART A & B / Product Type: Medicare /    In time:1250  Out time:129  Total Treatment Time (min): 39  Visit #: 4 of 8    Medicare/BCBS Only   Total Timed Codes (min):  39 1:1 Treatment Time:  39       Treatment Area: Neck pain [M54.2]  Pain in right shoulder [M25.511]    SUBJECTIVE  Pain Level (0-10 scale): 0/10  Any medication changes, allergies to medications, adverse drug reactions, diagnosis change, or new procedure performed?: [x] No    [] Yes (see summary sheet for update)  Subjective functional status/changes:   [] No changes reported  Reports she can reach behind the front seat only for light objects. She is much improved. OBJECTIVE      39 min Therapeutic Exercise:  [] See flow sheet :   Rationale: increase ROM, increase strength, improve coordination, improve balance and increase proprioception to improve the patients ability to ease with adl's            With   [] TE   [] TA   [] neuro   [] other: Patient Education: [x] Review HEP    [] Progressed/Changed HEP based on:   [] positioning   [] body mechanics   [] transfers   [] heat/ice application    [] other:      Other Objective/Functional Measures:   Pt demonstrats good form with [employer] for HEP continuation. Pt demo'd 3# weight placement onto overhead shelf x 10 w/o difficulty. Functional IR to [facility]. Right shoulder ROM to [facility] . Pain Level (0-10 scale) post treatment: 0/10    ASSESSMENT/Changes in Function: Progressing well towards DC next visit. Pt issued green and blue and orange [employer] for HEP.      Patient will continue to benefit from skilled PT services to modify and progress therapeutic interventions, address functional mobility deficits, address ROM deficits, address strength deficits, analyze and address soft tissue restrictions, analyze and cue movement patterns, analyze and modify body mechanics/ergonomics and assess and modify postural abnormalities to attain remaining goals. []  See Plan of Care  [x]  See progress note/recertification  []  See Discharge Summary         Progress towards goals / Updated goals:  Theo Baeza will demonstrate compliance with HEP in order to improve right shoulder strength for increased ease of ADLs. Status at last note/certification: pt continues to report partial compliance with HEP. 6/16/21     Goal: Patient will improve neck FOTO score by 10 points in order to demonstrate a significant improvement in function.   Status at last note/certification: No change 7-7-21     Goal: Patient will improve shoulder FOTO score by 5 points in order to demonstrate a significant improvement in function.   Status at last note/certification: Regressed 1 points 7-7-21     Goal: Patient will improve right shoulder ER MMT to 4/5 in order to increase ease of household chores.   Status at last note/certification: not met 3+/5 7-7-21  MET: ER=4/5    PLAN  [x]  Upgrade activities as tolerated     [x]  Continue plan of care  []  Update interventions per flow sheet       []  Discharge due to:_  []  Other:_      Fallon Triana, PT 7/27/2021  1:13 PM    Future Appointments   Date Time Provider Mayela Rahman   7/29/2021  8:15 AM Gera Swan PT [facility]

## 2021-07-30 ENCOUNTER — HOSPITAL ENCOUNTER (OUTPATIENT)
Dept: PHYSICAL THERAPY | Age: 76
Discharge: HOME | End: 2021-07-30
Attending: PHYSICAL MEDICINE & REHABILITATION
Payer: MEDICARE

## 2021-07-30 PROCEDURE — 97110 THERAPEUTIC EXERCISES: CPT

## 2021-07-30 NOTE — PROGRESS NOTES
PT DAILY TREATMENT NOTE     Patient Name: John Nieto  Date:2021  : 1945  [x]  Patient  Verified  Payor: VA MEDICARE / Plan: VA MEDICARE PART A & B / Product Type: Medicare /    In time:1035  Out time:115  Total Treatment Time (min): 40  Visit #: 5 of 8    Medicare/BCBS Only   Total Timed Codes (min):  40 1:1 Treatment Time:  40       Treatment Area: Neck pain [M54.2]  Pain in right shoulder [M25.511]    SUBJECTIVE  Pain Level (0-10 scale): 0/10  Any medication changes, allergies to medications, adverse drug reactions, diagnosis change, or new procedure performed?: [x] No    [] Yes (see summary sheet for update)  Subjective functional status/changes:   [] No changes reported  Reports 80% improved. OBJECTIVE      []redness - adverse   40 min Therapeutic Exercise:  [] See flow sheet :   Rationale: increase ROM and increase strength to improve the patients ability to ease with adl's      With   [] TE   [] TA   [] neuro   [] other: Patient Education: [x] Review HEP    [] Progressed/Changed HEP based on:   [] positioning   [] body mechanics   [] transfers   [] heat/ice application    [] other:      Other Objective/Functional Measures:   CS Rotation=74deg  75deg FOTO=61   Right sh MMT: 4/5 grossly    Pain Level (0-10 scale) post treatment: 0/10    ASSESSMENT/Changes in Function: 80% improvement. Pt ready for DC to issued HEP. Patient will continue to benefit from skilled PT services to modify and progress therapeutic interventions, address functional mobility deficits, address ROM deficits, address strength deficits, analyze and address soft tissue restrictions, analyze and cue movement patterns, analyze and modify body mechanics/ergonomics and assess and modify postural abnormalities to attain remaining goals.      []  See Plan of Care  []  See progress note/recertification  []  See Discharge Summary         Progress towards goals / Updated goals:  Goal: Patient will demonstrate compliance with HEP in order to improve right shoulder strength for increased ease of ADLs. Status at last note/certification: pt continues to report partial compliance with HEP. 6/16/21     Goal: Patient will improve neck FOTO score by 10 points in order to demonstrate a significant improvement in function.   Status at last note/certification: No change 7-7-21     Goal: Patient will improve shoulder FOTO score by 5 points in order to demonstrate a significant improvement in function.   Status at last note/certification: Regressed 1 points 7-7-21     Goal: Patient will improve right shoulder ER MMT to 4/5 in order to increase ease of household chores.   Status at last note/certification: not met 3+/5 7-7-21  MET: ER=4/5       PLAN  []  Upgrade activities as tolerated     []  Continue plan of care  []  Update interventions per flow sheet       []  Discharge due to:_  []  Other:_      Comfort Leonard, PT 7/30/2021  10:11 AM    Future Appointments   Date Time Provider Mayela Rahman   7/30/2021 10:30 AM Delio Sylvester PT [facility]

## 2021-08-17 NOTE — ANCILLARY DISCHARGE INSTRUCTIONS
[facility] - [address]  [phone] fax    Physical Therapy Discharge Summary     Patient name: Floridalma Jensen Start of Care: 6/4/2021   Referral Raven Haley MD [facility]: [id number]                Medical Diagnosis: Neck pain [M54.2]  Pain in right shoulder [M25.511]  Payor: VA MEDICARE / Plan: VA MEDICARE PART A & B / Product Type: Medicare /  Onset Date: a month ago                Treatment Diagnosis:  Neck and right shoulder pain   Prior Hospitalization: see medical history Provider#: [id number]   Medications: Verified on Patient summary List    Comorbidities: none reported   Prior Level of Function: right hand dominant, Ind with ADLs        Visits from Start of Care: 13    Missed Visits: 0    Reporting Period : 7/7/21 to 7/30/21    Summary of Care:  Irish Randler will demonstrate compliance with HEP in order to improve right shoulder strength for increased ease of ADLs. Status at last note/certification: pt continues to report partial compliance with HEP. 6/16/21   Status at DC: MET    Goal: Patient will improve neck FOTO score by 10 points in order to demonstrate a significant improvement in function.   Status at last note/certification: No change 7-7-21   Status at DC: MET    Goal: Patient will improve shoulder FOTO score by 5 points in order to demonstrate a significant improvement in function.   Status at last note/certification: Regressed 1 points 7-7-21   Status at DC:Progressing/not MET    Goal: Patient will improve right shoulder ER MMT to 4/5 in order to increase ease of household chores. Status at last note/certification: not met 3+/5 7-7-21  Status at DC: MET: ER=4/5    CS Rotation=74deg  75deg FOTO=61           Right sh MMT: 4/5 grossly  Pt reports 80% improvement. Pt ready for DC to HEP.         ASSESSMENT/RECOMMENDATIONS:  [x]Discontinue therapy: [x]Patient has reached or is progressing toward set goals      []Patient is non-compliant or has abdicated      []Due to lack of appreciable progress towards set goals    Brandyn Guthrie, PT 8/17/2021 1:08 PM

## 2021-08-20 ENCOUNTER — OFFICE VISIT (OUTPATIENT)
Dept: FAMILY MEDICINE CLINIC | Age: 76
End: 2021-08-20
Payer: MEDICARE

## 2021-08-20 VITALS
WEIGHT: 212 LBS | OXYGEN SATURATION: 98 % | HEIGHT: 62 IN | RESPIRATION RATE: 24 BRPM | SYSTOLIC BLOOD PRESSURE: 128 MMHG | HEART RATE: 68 BPM | BODY MASS INDEX: 39.01 KG/M2 | TEMPERATURE: 98.1 F | DIASTOLIC BLOOD PRESSURE: 82 MMHG

## 2021-08-20 DIAGNOSIS — R11.0 NAUSEA: ICD-10-CM

## 2021-08-20 DIAGNOSIS — M54.9 UPPER BACK PAIN ON LEFT SIDE: ICD-10-CM

## 2021-08-20 DIAGNOSIS — R61 DIAPHORESIS: Primary | ICD-10-CM

## 2021-08-20 PROCEDURE — 99214 OFFICE O/P EST MOD 30 MIN: CPT | Performed by: FAMILY MEDICINE

## 2021-08-20 PROCEDURE — G0463 HOSPITAL OUTPT CLINIC VISIT: HCPCS | Performed by: FAMILY MEDICINE

## 2021-08-20 NOTE — PROGRESS NOTES
Anthony Macedo presents today for   Chief Complaint   Patient presents with    Back Pain     c/u upper left side back pain,feels increase in sharp pain with inspiration. Began 3 days ago and intensity has decreased. Pt states the pain made deep breathing difficullt. Anthony Macedo preferred language for health care discussion is english/other. Is someone accompanying this pt? No.    Is the patient using any DME equipment during OV? Depression Screening:  3 most recent PHQ Screens 5/5/2021   Little interest or pleasure in doing things Not at all   Feeling down, depressed, irritable, or hopeless Not at all   Total Score PHQ 2 0       Learning Assessment:  Learning Assessment 5/5/2021   PRIMARY LEARNER Patient   HIGHEST LEVEL OF EDUCATION - PRIMARY LEARNER  SOME COLLEGE   BARRIERS PRIMARY LEARNER NONE   CO-LEARNER CAREGIVER No   PRIMARY LANGUAGE ENGLISH   LEARNER PREFERENCE PRIMARY LISTENING   ANSWERED BY patient    RELATIONSHIP SELF       Fall Risk  Fall Risk Assessment, last 12 mths 7/2/2021   Able to walk? Yes   Fall in past 12 months? 0   Do you feel unsteady? -   Are you worried about falling -       Travel Screening:   Travel Screening     Question   Response    In the last month, have you been in contact with someone who was confirmed or suspected to have Coronavirus / COVID-19? No / Unsure    Have you had a COVID-19 viral test in the last 14 days? No    Do you have any of the following new or worsening symptoms? None of these    Have you traveled internationally or domestically in the last month? No      Travel History   Travel since 07/20/21     No documented travel since 07/20/21          Health Maintenance reviewed and discussed and ordered per Provider. Health Maintenance Due   Topic Date Due    Shingrix Vaccine Age 50> (1 of 2) Never done   . Coordination of Care:  1. Have you been to the ER, urgent care clinic since your last visit? Hospitalized since your last visit? No    2.  Have you seen or consulted any other health care providers outside of the [address] [place] [address] since your last visit? Include any pap smears or colon screening.  No

## 2021-08-20 NOTE — PROGRESS NOTES
Chief Complaint   Patient presents with    Back Pain     c/u upper left side back pain,feels increase in sharp pain with inspiration. Began 3 days ago and intensity has decreased. Pt states the pain made deep breathing difficullt. CARSON Davey is a 77 y.o. female presenting today for eval of new onset sx. She had weakness after being out walking for a while 4 days ago. Then, 3 days ago, she was out walking again and got weak and nauseous again. Each time, she had to go sit in the car. Each time, she had pain in the L  Upper back that made it difficult to take a deep breath. She has not had any further episodes of this pain but she has not had any more walking. Pt did not have chest pain either time but did have sweating associated with her sx the second day. No prior hx of similar sx. Review of Systems   Constitutional: Negative. HENT: Negative. Respiratory: Negative. Cardiovascular: Negative for chest pain and palpitations. Diaphoresis   Gastrointestinal: Positive for nausea. Musculoskeletal: Positive for back pain. Neurological: Positive for weakness. All other systems reviewed and are negative. Physical Exam  Vitals and nursing note reviewed. Constitutional:       Appearance: Normal appearance. She is not ill-appearing. HENT:      Head: Normocephalic and atraumatic. Right Ear: External ear normal.      Left Ear: External ear normal.      Nose: Nose normal.      Mouth/Throat:      Mouth: Mucous membranes are moist.   Eyes:      Extraocular Movements: Extraocular movements intact. Conjunctiva/sclera: Conjunctivae normal.   Cardiovascular:      Rate and Rhythm: Normal rate and regular rhythm. Heart sounds: No murmur heard. No friction rub. No gallop. Pulmonary:      Effort: Pulmonary effort is normal.      Breath sounds: Normal breath sounds. No wheezing, rhonchi or rales. Musculoskeletal:         General: Normal range of motion. Cervical back: Normal range of motion. Skin:     General: Skin is warm and dry. Neurological:      Mental Status: She is alert and oriented to person, place, and time. Coordination: Coordination normal.   Psychiatric:         Mood and Affect: Mood normal.         Behavior: Behavior normal.         Thought Content: Thought content normal.         Judgment: Judgment normal.         Diagnoses and all orders for this visit:    1. Diaphoresis  -     REFERRAL TO CARDIOLOGY  2. Nausea  -     REFERRAL TO CARDIOLOGY  3. Upper back pain on left side  -     REFERRAL TO CARDIOLOGY  Concerned about possible anginal equivalent. Pt strongly advised to seek emergency care if symptoms return. Typical symptoms of heart attack reviewed in detail. Patient cautioned that women often do not have the classic symptoms of a heart attack. Patient understands and agrees with treatment plan. Follow-up and Dispositions    · Return if symptoms worsen or fail to improve.

## 2021-09-20 ENCOUNTER — OFFICE VISIT (OUTPATIENT)
Dept: CARDIOLOGY CLINIC | Age: 76
End: 2021-09-20
Payer: MEDICARE

## 2021-09-20 VITALS
HEART RATE: 58 BPM | WEIGHT: 212 LBS | OXYGEN SATURATION: 99 % | DIASTOLIC BLOOD PRESSURE: 60 MMHG | HEIGHT: 62 IN | BODY MASS INDEX: 39.01 KG/M2 | SYSTOLIC BLOOD PRESSURE: 128 MMHG

## 2021-09-20 DIAGNOSIS — K52.9 AGE (ACUTE GASTROENTERITIS): ICD-10-CM

## 2021-09-20 DIAGNOSIS — I10 BENIGN HYPERTENSION: ICD-10-CM

## 2021-09-20 DIAGNOSIS — R07.9 CHEST PAIN, UNSPECIFIED TYPE: ICD-10-CM

## 2021-09-20 DIAGNOSIS — R94.31 ABNORMAL EKG: Primary | ICD-10-CM

## 2021-09-20 PROCEDURE — G8399 PT W/DXA RESULTS DOCUMENT: HCPCS | Performed by: INTERNAL MEDICINE

## 2021-09-20 PROCEDURE — G8754 DIAS BP LESS 90: HCPCS | Performed by: INTERNAL MEDICINE

## 2021-09-20 PROCEDURE — G8536 NO DOC ELDER MAL SCRN: HCPCS | Performed by: INTERNAL MEDICINE

## 2021-09-20 PROCEDURE — G8752 SYS BP LESS 140: HCPCS | Performed by: INTERNAL MEDICINE

## 2021-09-20 PROCEDURE — 1090F PRES/ABSN URINE INCON ASSESS: CPT | Performed by: INTERNAL MEDICINE

## 2021-09-20 PROCEDURE — 99204 OFFICE O/P NEW MOD 45 MIN: CPT | Performed by: INTERNAL MEDICINE

## 2021-09-20 PROCEDURE — G8428 CUR MEDS NOT DOCUMENT: HCPCS | Performed by: INTERNAL MEDICINE

## 2021-09-20 PROCEDURE — G8510 SCR DEP NEG, NO PLAN REQD: HCPCS | Performed by: INTERNAL MEDICINE

## 2021-09-20 PROCEDURE — G9899 SCRN MAM PERF RSLTS DOC: HCPCS | Performed by: INTERNAL MEDICINE

## 2021-09-20 PROCEDURE — 93000 ELECTROCARDIOGRAM COMPLETE: CPT | Performed by: INTERNAL MEDICINE

## 2021-09-20 PROCEDURE — G8417 CALC BMI ABV UP PARAM F/U: HCPCS | Performed by: INTERNAL MEDICINE

## 2021-09-20 PROCEDURE — 3017F COLORECTAL CA SCREEN DOC REV: CPT | Performed by: INTERNAL MEDICINE

## 2021-09-20 PROCEDURE — 1101F PT FALLS ASSESS-DOCD LE1/YR: CPT | Performed by: INTERNAL MEDICINE

## 2021-09-20 NOTE — PROGRESS NOTES
[facility]    Ms. Emily Norton is 52-year-old female with no significant past medical history. She denies any prior history of MI or CHF. Patient was sent to me for the evaluation of cardiac problem. Approximately a month ago, patient had a episode of very she was in the store walking around and suddenly started feeling weak still with some nausea and diaphoresis. That happened couple times over the next 24 hours. In one occasion she also had a chest discomfort under the left breast radiating towards left back area. This lasted less than 5 to 10 minutes. Since then she had no more episode. Prior to this episode she did not have any episode like this before. She denies PND or lower extremity swelling. Denies any nausea, vomiting, abdominal pain, fever, chills, sputum production. 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 All others negative. Denies fatigue, malaise, skin rash, joint pain, blurring vision, photophobia, neck pain, hemoptysis, chronic cough, nausea, vomiting, hematuria, burning micturition, BRBPR, chronic headaches. Current Outpatient Medications   Medication Sig    aspirin delayed-release (ADULT LOW DOSE ASPIRIN) 81 mg tablet Take 1 Tab by mouth daily.  cholecalciferol (VITAMIN D3) 1,000 unit cap Take 1,000 Units by mouth every other day. No current facility-administered medications for this visit.        Past Surgical History:   Procedure Laterality Date    COLONOSCOPY N/A 5/19/2016    COLONOSCOPY with polypectomy [facility] Jodie Chambers MD [facility] ENDOSCOPY    COLONOSCOPY N/A 2/13/2018    COLONOSCOPY / polypectomy performed by Patricia Paul MD at [facility] HYSTERECTOMY      TX BREAST SURGERY PROCEDURE UNLISTED      biopsy, uncertain of which side       Allergies and Sensitivities:  No Known Allergies    Family History:  Family History   Problem Relation Age of Onset    Hypertension Mother     Prostate Cancer Father        Social History:  Social History     Tobacco Use    Smoking status: Former Smoker     Years: 13.00    Smokeless tobacco: Never Used    Tobacco comment: occasional, stopped at age 37   Substance Use Topics    Alcohol use: No    Drug use: No     She  reports that she has quit smoking. She quit after 13.00 years of use. She has never used smokeless tobacco.  She  reports no history of alcohol use. Physical Exam:  BP Readings from Last 3 Encounters:   09/20/21 128/60   08/20/21 128/82   07/08/21 126/77         Pulse Readings from Last 3 Encounters:   09/20/21 (!) 58   08/20/21 68   07/08/21 68          Wt Readings from Last 3 Encounters:   09/20/21 96.2 kg (212 lb)   08/20/21 96.2 kg (212 lb)   07/08/21 95.7 kg (211 lb)       Constitutional: Oriented to person, place, and time. HENT: Head: Normocephalic and atraumatic. Eyes: Conjunctivae and extraocular motions are normal.   Neck: No JVD present. Carotid bruit is not appreciated. Cardiovascular: Regular rhythm. No murmur, gallop or rubs appreciated  Lung: Breath sounds normal. No respiratory distress. No ronchi or rales appreciated  Abdominal: No tenderness. No rebound and no guarding. Musculoskeletal: There is trace lower extremity edema. No cynosis  Lymphadenopathy:  No cervical or supraclavicular adenopathy appriciated. Neurological: No gross motor deficit noted. Skin: No visible skin rash noted. No Ear discharge noted  Psychiatric: Normal mood and affect.      LABS:   @  Lab Results   Component Value Date/Time    WBC 4.1 (L) 10/31/2017 11:07 AM    HGB 13.2 10/31/2017 11:07 AM    HCT 41.4 10/31/2017 11:07 AM    PLATELET 375 05/04/3928 11:07 AM    MCV 83.1 10/31/2017 11:07 AM Lab Results   Component Value Date/Time    Sodium 143 2019 11:22 AM    Potassium 3.8 2019 11:22 AM    Chloride 110 (H) 2019 11:22 AM    CO2 28 2019 11:22 AM    Glucose 83 2019 11:22 AM    BUN 12 2019 11:22 AM    Creatinine 0.72 2019 11:22 AM     Lipids Latest Ref Rng & Units 2019 10/31/2017   Chol, Total <200 MG/ 173   HDL 40 - 60 MG/DL 81(H) 84(H)   LDL 0 - 100 MG/DL 68.2 74.8   Trig <150 MG/DL 69 71   Chol/HDL Ratio 0 - 5.0   2.0 2.1   Some recent data might be hidden     Lab Results   Component Value Date/Time    ALT (SGPT) 16 2019 11:22 AM     No results found for: HBA1C, WZW9TBBI, QIG5IEGW  Lab Results   Component Value Date/Time    TSH 0.87 2016 12:25 PM     EK2021: Sinus bradycardia at 60 bpm.  No ST-T changes of ischemia. Late R wave transition. STRESS TEST (EST, PHARM, NUC, ECHO etc)    CATHETERIZATION    IMPRESSION & PLAN:  Ms. Zeenat Almonte is a 80-year-old female     Chest pain /nausea/diaphoresis:  Patient had episode of the symptoms as mentioned above over 48/72 hours. Concerning for angina  We will proceed with nuclear stress test.  Proceed with echocardiogram to rule out abnormal cardiac structure  Start aspirin 81 mg daily. Advised against exertional activity until further cardiac work-up is finished    This plan was discussed with patient who is in agreement. Thank you for allowing me to participate in patient care. Please feel free to call me if you have any question or concern. Viktor Ribeiro MD  Please note: This document has been produced using voice recognition software. Unrecognized errors in transcription may be present.

## 2021-09-20 NOTE — LETTER
9/20/2021    Patient: Benjamin Berrios   YOB: 1945   Date of Visit: 9/20/2021     Shavon Laguna MD  [id number]  [phone]  [facility]    Dear Shavon Laguna MD,      Thank you for referring Ms. Renetta Solo to [facility] for evaluation. My notes for this consultation are attached. If you have questions, please do not hesitate to call me. I look forward to following your patient along with you.       Sincerely,    Keara Shoemaker MD

## 2021-10-28 ENCOUNTER — TELEPHONE (OUTPATIENT)
Dept: CARDIOLOGY CLINIC | Age: 76
End: 2021-10-28

## 2021-11-02 ENCOUNTER — TELEPHONE (OUTPATIENT)
Dept: CARDIOLOGY CLINIC | Age: 76
End: 2021-11-02

## 2021-11-02 NOTE — TELEPHONE ENCOUNTER
Called patient to inform of test results. Patient verified name and . Patient verbalized and understood results.

## 2021-11-02 NOTE — TELEPHONE ENCOUNTER
----- Message from María Harman MD sent at 10/29/2021  7:13 PM EDT -----  Cardiac testing appears normal.  Inform patient please    Thanks  SP

## 2021-11-03 NOTE — TELEPHONE ENCOUNTER
Attempted to contact pt at  number, no answer. Lvm on secure line nuclear stress normal. Advised pt if further clarification was needed to contact office at  [phone]. No other issues noted.

## 2021-12-23 NOTE — TELEPHONE ENCOUNTER
----- Message from Priscilla Whitt MD sent at 10/29/2021  7:13 PM EDT -----  Cardiac testing appears normal.  Inform patient please    Thanks  SP  Re: nuclear stress Calcipotriene Counseling:  I discussed with the patient the risks of calcipotriene including but not limited to erythema, scaling, itching, and irritation.

## 2022-01-10 ENCOUNTER — OFFICE VISIT (OUTPATIENT)
Dept: CARDIOLOGY CLINIC | Age: 77
End: 2022-01-10
Payer: MEDICARE

## 2022-01-10 VITALS
WEIGHT: 212 LBS | SYSTOLIC BLOOD PRESSURE: 122 MMHG | OXYGEN SATURATION: 96 % | HEIGHT: 62 IN | DIASTOLIC BLOOD PRESSURE: 88 MMHG | HEART RATE: 64 BPM | BODY MASS INDEX: 39.01 KG/M2

## 2022-01-10 DIAGNOSIS — E66.01 SEVERE OBESITY (BMI 35.0-35.9 WITH COMORBIDITY) (HCC): ICD-10-CM

## 2022-01-10 DIAGNOSIS — R07.9 CHEST PAIN, UNSPECIFIED TYPE: Primary | ICD-10-CM

## 2022-01-10 PROCEDURE — 99213 OFFICE O/P EST LOW 20 MIN: CPT | Performed by: INTERNAL MEDICINE

## 2022-01-10 PROCEDURE — G8428 CUR MEDS NOT DOCUMENT: HCPCS | Performed by: INTERNAL MEDICINE

## 2022-01-10 PROCEDURE — G8510 SCR DEP NEG, NO PLAN REQD: HCPCS | Performed by: INTERNAL MEDICINE

## 2022-01-10 PROCEDURE — 1090F PRES/ABSN URINE INCON ASSESS: CPT | Performed by: INTERNAL MEDICINE

## 2022-01-10 PROCEDURE — 3017F COLORECTAL CA SCREEN DOC REV: CPT | Performed by: INTERNAL MEDICINE

## 2022-01-10 PROCEDURE — G8399 PT W/DXA RESULTS DOCUMENT: HCPCS | Performed by: INTERNAL MEDICINE

## 2022-01-10 PROCEDURE — 1101F PT FALLS ASSESS-DOCD LE1/YR: CPT | Performed by: INTERNAL MEDICINE

## 2022-01-10 PROCEDURE — G8536 NO DOC ELDER MAL SCRN: HCPCS | Performed by: INTERNAL MEDICINE

## 2022-01-10 PROCEDURE — G8754 DIAS BP LESS 90: HCPCS | Performed by: INTERNAL MEDICINE

## 2022-01-10 PROCEDURE — G8752 SYS BP LESS 140: HCPCS | Performed by: INTERNAL MEDICINE

## 2022-01-10 PROCEDURE — G8417 CALC BMI ABV UP PARAM F/U: HCPCS | Performed by: INTERNAL MEDICINE

## 2022-01-10 NOTE — PROGRESS NOTES
[facility]    Ms. Gina Angel is 77 y.o. female with no significant past medical history. She denies any prior history of MI or CHF. Since last visit, patient has undergone echocardiogram and nuclear stress test.  She denies any cardiac symptoms whatsoever at this time. She denies any symptom that is concerning for angina or heart failure. No lower extremity swelling, palpitation, presyncope or syncope. Denies any nausea, vomiting, abdominal pain, fever, chills, sputum production. 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 All others negative. Denies fatigue, malaise, skin rash, joint pain, blurring vision, photophobia, neck pain, hemoptysis, chronic cough, nausea, vomiting, hematuria, burning micturition, BRBPR, chronic headaches. Current Outpatient Medications   Medication Sig    aspirin delayed-release (ADULT LOW DOSE ASPIRIN) 81 mg tablet Take 1 Tab by mouth daily.  cholecalciferol (VITAMIN D3) 1,000 unit cap Take 1,000 Units by mouth every other day. No current facility-administered medications for this visit.        Past Surgical History:   Procedure Laterality Date    COLONOSCOPY N/A 5/19/2016    COLONOSCOPY with polypectomy performed by Elodia Gleason MD at [facility] ENDOSCOPY    COLONOSCOPY N/A 2/13/2018    COLONOSCOPY / polypectomy performed by Delfino Alejandre MD at [facility] ENDOSCOPY    HX HYSTERECTOMY      NM BREAST SURGERY PROCEDURE UNLISTED      biopsy, uncertain of which side       Allergies and Sensitivities:  No Known Allergies    Family History:  Family History   Problem Relation Age of Onset    Hypertension Mother     Prostate Cancer Father        Social History:  Social History Tobacco Use    Smoking status: Former Smoker     Years: 13.00    Smokeless tobacco: Never Used    Tobacco comment: occasional, stopped at age 37   Substance Use Topics    Alcohol use: No    Drug use: No     She  reports that she has quit smoking. She quit after 13.00 years of use. She has never used smokeless tobacco.  She  reports no history of alcohol use. Physical Exam:  BP Readings from Last 3 Encounters:   01/10/22 122/88   10/29/21 128/60   10/29/21 136/82         Pulse Readings from Last 3 Encounters:   01/10/22 64   09/20/21 (!) 58   08/20/21 68          Wt Readings from Last 3 Encounters:   01/10/22 96.2 kg (212 lb)   10/29/21 96.2 kg (212 lb)   10/29/21 96.2 kg (212 lb)       Constitutional: Oriented to person, place, and time. HENT: Head: Normocephalic and atraumatic. Neck: No JVD present  Cardiovascular: Regular rhythm. No murmur, gallop or rubs appreciated  Lung: Breath sounds normal. No respiratory distress. No ronchi or rales appreciated  Abdominal: No tenderness. No rebound and no guarding. Musculoskeletal: There is no lower extremity edema.  No cynosis      LABS:   @  Lab Results   Component Value Date/Time    WBC 4.1 (L) 10/31/2017 11:07 AM    HGB 13.2 10/31/2017 11:07 AM    HCT 41.4 10/31/2017 11:07 AM    PLATELET 860 06/80/4570 11:07 AM    MCV 83.1 10/31/2017 11:07 AM     Lab Results   Component Value Date/Time    Sodium 143 04/29/2019 11:22 AM    Potassium 3.8 04/29/2019 11:22 AM    Chloride 110 (H) 04/29/2019 11:22 AM    CO2 28 04/29/2019 11:22 AM    Glucose 83 04/29/2019 11:22 AM    BUN 12 04/29/2019 11:22 AM    Creatinine 0.72 04/29/2019 11:22 AM     Lipids Latest Ref Rng & Units 4/29/2019   Chol, Total <200 MG/   HDL 40 - 60 MG/DL 81(H)   LDL 0 - 100 MG/DL 68.2   Trig <150 MG/DL 69   Chol/HDL Ratio 0 - 5.0   2.0   Some recent data might be hidden     Lab Results   Component Value Date/Time    ALT (SGPT) 16 04/29/2019 11:22 AM     No results found for: HBA1C, HCR0PVTE, QOE0FVPB, NKN9JDXA  Lab Results   Component Value Date/Time    TSH 0.87 2016 12:25 PM     EK2021: Sinus bradycardia at 60 bpm.  No ST-T changes of ischemia. Late R wave transition. ECHO ADULT COMPLETE 10/29/2021 10/29/2021  Interpretation Summary  · LV: Estimated LVEF is 55 - 60%. Visually measured ejection fraction. Normal cavity size and systolic function (ejection fraction normal). Mildly increased wall thickness. Wall motion: normal. Age-appropriate left ventricular diastolic function. · LA: Mildly dilated left atrium. Left Atrium volume index is 39 mL/m2. NUCLEAR CARDIAC STRESS TEST 10/29/2021 10/29/2021  Interpretation Summary  · Baseline ECG: Normal sinus rhythm. · SPECT: Left ventricular function post-stress was normal. Calculated ejection fraction is 65% (normal EF value is equal to or greater than 50%). Left ventricular wall motion was normal at stress, no regional wall motion abnormality noted. The TID ratio is 1. 11.  · Stress test: Stress EKG normal.  · SPECT: Left ventricular perfusion is normal.  · SPECT: Left ventricular perfusion is normal. Myocardial perfusion imaging supports a low risk stress test.    IMPRESSION & PLAN:  Ms. Brittanie Mejia is a 77 y.o. female     Chest pain /nausea/diaphoresis:  Denies any episode of chest pain, dyspnea, nausea or diaphoresis since last time. All symptoms have resolved. Echocardiogram and nuclear stress test in 10/2021, low risk as mentioned above. Finding discussed with the patient and she was reassured  In absence of cardiac symptoms, continue with resector modification    This plan was discussed with patient who is in agreement. Thank you for allowing me to participate in patient care. Please feel free to call me if you have any question or concern. Shyam Salter MD  Please note: This document has been produced using voice recognition software. Unrecognized errors in transcription may be present.

## 2022-01-10 NOTE — PROGRESS NOTES
Aman Chamberlain presents today for   Chief Complaint   Patient presents with    Follow-up     3 month       Aman Chamberlain preferred language for health care discussion is english/other. Is someone accompanying this pt? no    Is the patient using any DME equipment during [address]? no    Depression Screening:  3 most recent PHQ Screens 1/10/2022   Little interest or pleasure in doing things Not at all   Feeling down, depressed, irritable, or hopeless Not at all   Total Score PHQ 2 0       Learning Assessment:  Learning Assessment 5/5/2021   PRIMARY LEARNER Patient   HIGHEST LEVEL OF EDUCATION - PRIMARY LEARNER  SOME COLLEGE   BARRIERS PRIMARY LEARNER NONE   CO-LEARNER CAREGIVER No   PRIMARY LANGUAGE ENGLISH   LEARNER PREFERENCE PRIMARY LISTENING   ANSWERED BY patient    RELATIONSHIP SELF       Abuse Screening:  Abuse Screening Questionnaire 1/10/2022   Do you ever feel afraid of your partner? N   Are you in a relationship with someone who physically or mentally threatens you? N   Is it safe for you to go home? Y       Fall Risk  Fall Risk Assessment, last 12 mths 1/10/2022   Able to walk? Yes   Fall in past 12 months? 0   Do you feel unsteady? 0   Are you worried about falling 0           Pt currently taking Anticoagulant therapy? no    Pt currently taking Antiplatelet therapy ? Aspirin 81 mg      Coordination of Care:  1. Have you been to the ER, urgent care clinic since your last visit? Hospitalized since your last visit? no    2. Have you seen or consulted any other health care providers outside of the [address] [place] [address] since your last visit? Include any pap smears or colon screening.  no

## 2022-01-10 NOTE — LETTER
1/10/2022    Patient: Amna Tripathi   YOB: 1945   Date of Visit: 1/10/2022     Paulie Keita MD  [id number]  [phone]  [facility]    Dear Paulie Keita MD,      Thank you for referring Ms. Fernando Trevizo to [facility] for evaluation. My notes for this consultation are attached. If you have questions, please do not hesitate to call me. I look forward to following your patient along with you.       Sincerely,    Camacho Khan MD

## 2022-03-19 PROBLEM — E55.9 VITAMIN D DEFICIENCY: Status: ACTIVE | Noted: 2018-01-23

## 2022-03-20 PROBLEM — M25.512 ACUTE PAIN OF LEFT SHOULDER: Status: ACTIVE | Noted: 2019-04-23

## 2022-03-20 PROBLEM — M25.562 ACUTE PAIN OF LEFT KNEE: Status: ACTIVE | Noted: 2019-04-23

## 2022-04-27 ENCOUNTER — HOSPITAL ENCOUNTER (EMERGENCY)
Age: 77
Discharge: HOME | End: 2022-04-27
Attending: EMERGENCY MEDICINE
Payer: MEDICARE

## 2022-04-27 ENCOUNTER — TELEPHONE (OUTPATIENT)
Dept: FAMILY MEDICINE CLINIC | Age: 77
End: 2022-04-27

## 2022-04-27 ENCOUNTER — APPOINTMENT (OUTPATIENT)
Dept: CT IMAGING | Age: 77
End: 2022-04-27
Attending: EMERGENCY MEDICINE
Payer: MEDICARE

## 2022-04-27 VITALS
BODY MASS INDEX: 37.56 KG/M2 | DIASTOLIC BLOOD PRESSURE: 85 MMHG | HEIGHT: 64 IN | RESPIRATION RATE: 17 BRPM | OXYGEN SATURATION: 97 % | WEIGHT: 220 LBS | SYSTOLIC BLOOD PRESSURE: 160 MMHG | TEMPERATURE: 98.4 F | HEART RATE: 71 BPM

## 2022-04-27 DIAGNOSIS — N20.0 KIDNEY STONE: Primary | ICD-10-CM

## 2022-04-27 DIAGNOSIS — D35.02 ADRENAL ADENOMA, LEFT: ICD-10-CM

## 2022-04-27 LAB
ALBUMIN SERPL-MCNC: 3.3 G/DL (ref 3.4–5)
ALBUMIN/GLOB SERPL: 0.7 {RATIO} (ref 0.8–1.7)
ALP SERPL-CCNC: 83 U/L (ref 45–117)
ALT SERPL-CCNC: 24 U/L (ref 13–56)
ANION GAP SERPL CALC-SCNC: 4 MMOL/L (ref 3–18)
APPEARANCE UR: CLEAR
AST SERPL-CCNC: 48 U/L (ref 10–38)
BASOPHILS # BLD: 0 K/UL (ref 0–0.1)
BASOPHILS NFR BLD: 0 % (ref 0–2)
BILIRUB DIRECT SERPL-MCNC: <0.1 MG/DL (ref 0–0.2)
BILIRUB SERPL-MCNC: 0.7 MG/DL (ref 0.2–1)
BILIRUB UR QL: NEGATIVE
BUN SERPL-MCNC: 19 MG/DL (ref 7–18)
BUN/CREAT SERPL: 17 (ref 12–20)
CALCIUM SERPL-MCNC: 8.6 MG/DL (ref 8.5–10.1)
CHLORIDE SERPL-SCNC: 107 MMOL/L (ref 100–111)
CO2 SERPL-SCNC: 29 MMOL/L (ref 21–32)
COLOR UR: YELLOW
CREAT SERPL-MCNC: 1.14 MG/DL (ref 0.6–1.3)
DIFFERENTIAL METHOD BLD: ABNORMAL
EOSINOPHIL # BLD: 0.1 K/UL (ref 0–0.4)
EOSINOPHIL NFR BLD: 1 % (ref 0–5)
EPITH CASTS URNS QL MICRO: NORMAL /LPF (ref 0–5)
ERYTHROCYTE [DISTWIDTH] IN BLOOD BY AUTOMATED COUNT: 15.3 % (ref 11.6–14.5)
GLOBULIN SER CALC-MCNC: 4.5 G/DL (ref 2–4)
GLUCOSE SERPL-MCNC: 95 MG/DL (ref 74–99)
GLUCOSE UR STRIP.AUTO-MCNC: NEGATIVE MG/DL
HCT VFR BLD AUTO: 43.9 % (ref 35–45)
HGB BLD-MCNC: 14.1 G/DL (ref 12–16)
HGB UR QL STRIP: ABNORMAL
IMM GRANULOCYTES # BLD AUTO: 0 K/UL (ref 0–0.04)
IMM GRANULOCYTES NFR BLD AUTO: 0 % (ref 0–0.5)
KETONES UR QL STRIP.AUTO: NEGATIVE MG/DL
LEUKOCYTE ESTERASE UR QL STRIP.AUTO: ABNORMAL
LIPASE SERPL-CCNC: 145 U/L (ref 73–393)
LYMPHOCYTES # BLD: 1.5 K/UL (ref 0.9–3.6)
LYMPHOCYTES NFR BLD: 23 % (ref 21–52)
MCH RBC QN AUTO: 26.9 PG (ref 24–34)
MCHC RBC AUTO-ENTMCNC: 32.1 G/DL (ref 31–37)
MCV RBC AUTO: 83.8 FL (ref 78–100)
MONOCYTES # BLD: 0.5 K/UL (ref 0.05–1.2)
MONOCYTES NFR BLD: 7 % (ref 3–10)
NEUTS SEG # BLD: 4.4 K/UL (ref 1.8–8)
NEUTS SEG NFR BLD: 69 % (ref 40–73)
NITRITE UR QL STRIP.AUTO: NEGATIVE
NRBC # BLD: 0 K/UL (ref 0–0.01)
NRBC BLD-RTO: 0 PER 100 WBC
PH UR STRIP: 6 [PH] (ref 5–8)
PLATELET # BLD AUTO: 212 K/UL (ref 135–420)
PMV BLD AUTO: 11 FL (ref 9.2–11.8)
POTASSIUM SERPL-SCNC: 5.1 MMOL/L (ref 3.5–5.5)
PROT SERPL-MCNC: 7.8 G/DL (ref 6.4–8.2)
PROT UR STRIP-MCNC: NEGATIVE MG/DL
RBC # BLD AUTO: 5.24 M/UL (ref 4.2–5.3)
RBC #/AREA URNS HPF: NORMAL /HPF (ref 0–5)
SODIUM SERPL-SCNC: 140 MMOL/L (ref 136–145)
SP GR UR REFRACTOMETRY: 1.01 (ref 1–1.03)
UROBILINOGEN UR QL STRIP.AUTO: 0.2 EU/DL (ref 0.2–1)
WBC # BLD AUTO: 6.4 K/UL (ref 4.6–13.2)
WBC URNS QL MICRO: NORMAL /HPF (ref 0–4)

## 2022-04-27 PROCEDURE — 96375 TX/PRO/DX INJ NEW DRUG ADDON: CPT

## 2022-04-27 PROCEDURE — 99285 EMERGENCY DEPT VISIT HI MDM: CPT

## 2022-04-27 PROCEDURE — 80048 BASIC METABOLIC PNL TOTAL CA: CPT

## 2022-04-27 PROCEDURE — 80076 HEPATIC FUNCTION PANEL: CPT

## 2022-04-27 PROCEDURE — 81001 URINALYSIS AUTO W/SCOPE: CPT

## 2022-04-27 PROCEDURE — 74177 CT ABD & PELVIS W/CONTRAST: CPT

## 2022-04-27 PROCEDURE — 96374 THER/PROPH/DIAG INJ IV PUSH: CPT

## 2022-04-27 PROCEDURE — 74011250636 HC RX REV CODE- 250/636: Performed by: EMERGENCY MEDICINE

## 2022-04-27 PROCEDURE — 83690 ASSAY OF LIPASE: CPT

## 2022-04-27 PROCEDURE — 85025 COMPLETE CBC W/AUTO DIFF WBC: CPT

## 2022-04-27 PROCEDURE — 87086 URINE CULTURE/COLONY COUNT: CPT

## 2022-04-27 PROCEDURE — 74011000636 HC RX REV CODE- 636: Performed by: EMERGENCY MEDICINE

## 2022-04-27 RX ORDER — ONDANSETRON 2 MG/ML
4 INJECTION INTRAMUSCULAR; INTRAVENOUS
Status: COMPLETED | OUTPATIENT
Start: 2022-04-27 | End: 2022-04-27

## 2022-04-27 RX ORDER — TAMSULOSIN HYDROCHLORIDE 0.4 MG/1
0.4 CAPSULE ORAL DAILY
Qty: 10 CAPSULE | Refills: 0 | Status: SHIPPED | OUTPATIENT
Start: 2022-04-27 | End: 2022-05-07

## 2022-04-27 RX ORDER — HYDROCODONE BITARTRATE AND ACETAMINOPHEN 5; 325 MG/1; MG/1
1 TABLET ORAL
Qty: 10 TABLET | Refills: 0 | Status: SHIPPED | OUTPATIENT
Start: 2022-04-27 | End: 2022-04-30

## 2022-04-27 RX ORDER — KETOROLAC TROMETHAMINE 15 MG/ML
15 INJECTION, SOLUTION INTRAMUSCULAR; INTRAVENOUS
Status: COMPLETED | OUTPATIENT
Start: 2022-04-27 | End: 2022-04-27

## 2022-04-27 RX ORDER — KETOROLAC TROMETHAMINE 10 MG/1
10 TABLET, FILM COATED ORAL
Qty: 12 TABLET | Refills: 0 | Status: SHIPPED | OUTPATIENT
Start: 2022-04-27 | End: 2022-05-02

## 2022-04-27 RX ORDER — ONDANSETRON 4 MG/1
4 TABLET, ORALLY DISINTEGRATING ORAL
Qty: 6 TABLET | Refills: 0 | Status: SHIPPED | OUTPATIENT
Start: 2022-04-27 | End: 2022-04-30

## 2022-04-27 RX ADMIN — ONDANSETRON 4 MG: 2 INJECTION INTRAMUSCULAR; INTRAVENOUS at 14:29

## 2022-04-27 RX ADMIN — IOPAMIDOL 80 ML: 612 INJECTION, SOLUTION INTRAVENOUS at 15:10

## 2022-04-27 RX ADMIN — SODIUM CHLORIDE 1000 ML: 900 INJECTION, SOLUTION INTRAVENOUS at 14:29

## 2022-04-27 RX ADMIN — KETOROLAC TROMETHAMINE 15 MG: 15 INJECTION, SOLUTION INTRAMUSCULAR; INTRAVENOUS at 14:29

## 2022-04-27 NOTE — ED PROVIDER NOTES
EMERGENCY DEPARTMENT HISTORY AND PHYSICAL EXAM    2:28 PM    Date: 4/27/2022  Patient Name: Juan Zhang    History of Presenting Illness     Chief Complaint   Patient presents with    Nausea    Fatigue       History Provided By: Patient  Location/Duration/Severity/Modifying factors   Is a 67-year-old female with no pertinent medical history presents the emergency room with a chief complaint of abdominal pain. This is associated with nausea and vomiting. Patient reports that the symptoms originally onset 3 days ago. She had a buffet meal at [facility] on Sunday. She reports that the following day she began getting nauseous and had pain her symptoms were so severe that she was laying down on the floor on the tile to try to relieve them. Reports that they seem to dissipate on their own after a few hours. The following day she reports that they seem to have gradually improved and she was able to somewhat tolerate normal food. Reports that today the symptoms return she developed nausea with no vomiting, and she developed pain located in the suprapubic and periumbilical region. Pain rated as mild to moderate at this time, not as severe as it was 2 days ago. She denies any diarrhea or constipation no prior abdominal surgeries no alcohol use no drug use. No chest pain and no shortness of breath. PCP: Dortha Severs, MD    Current Outpatient Medications   Medication Sig Dispense Refill    ketorolac (TORADOL) 10 mg tablet Take 1 Tablet by mouth every eight to twelve (8-12) hours as needed for Pain (Mild to Moderate;) for up to 5 days. Take with food. 12 Tablet 0    ondansetron (ZOFRAN ODT) 4 mg disintegrating tablet Take 1 Tablet by mouth every eight (8) hours as needed for Nausea or Vomiting for up to 3 days. 6 Tablet 0    HYDROcodone-acetaminophen (Norco) 5-325 mg per tablet Take 1 Tablet by mouth every six (6) hours as needed for Pain (Severe pain) for up to 3 days.  Max Daily Amount: 4 Tablets. 10 Tablet 0    tamsulosin (Flomax) 0.4 mg capsule Take 1 Capsule by mouth daily for 10 days. 10 Capsule 0    aspirin delayed-release (ADULT LOW DOSE ASPIRIN) 81 mg tablet Take 1 Tab by mouth daily. 30 Tab prn    cholecalciferol (VITAMIN D3) 1,000 unit cap Take 1,000 Units by mouth every other day. Past History     Past Medical History:  Past Medical History:   Diagnosis Date    Breast mass     right,  excisional biopsy 1/15,  atypical ductal hyperplasia ([facility] 1/15)    Colon polyps     Hemorrhoids        Past Surgical History:  Past Surgical History:   Procedure Laterality Date    COLONOSCOPY N/A 5/19/2016    COLONOSCOPY with polypectomy performed by Breanne Lawson MD at [facility] ENDOSCOPY    COLONOSCOPY N/A 2/13/2018    COLONOSCOPY / polypectomy performed by Alessio Hernandez MD at [facility] ENDOSCOPY    HX HYSTERECTOMY      TN BREAST SURGERY PROCEDURE UNLISTED      biopsy, uncertain of which side       Family History:  Family History   Problem Relation Age of Onset    Hypertension Mother     Prostate Cancer Father        Social History:  Social History     Tobacco Use    Smoking status: Former Smoker     Years: 13.00    Smokeless tobacco: Never Used    Tobacco comment: occasional, stopped at age 37   Substance Use Topics    Alcohol use: No    Drug use: No       Allergies:  No Known Allergies    I reviewed and confirmed the above information with patient and updated as necessary. Review of Systems     Review of Systems   Constitutional: Negative for chills and fever. HENT: Negative for congestion, rhinorrhea, sinus pressure and sneezing. Eyes: Negative for visual disturbance. Respiratory: Negative for cough and shortness of breath. Cardiovascular: Negative for chest pain and leg swelling. Gastrointestinal: Positive for abdominal pain and nausea. Negative for abdominal distention, anal bleeding, blood in stool, constipation, diarrhea and vomiting.    Genitourinary: Negative for dysuria, frequency, urgency, vaginal bleeding and vaginal discharge. Musculoskeletal: Negative for back pain and neck pain. Skin: Negative for rash. Neurological: Negative for syncope, numbness and headaches. Physical Exam     Visit Vitals  BP (!) 160/85   Pulse 71   Temp 98.4 °F (36.9 °C)   Resp 17   Ht 5' 4\" (1.626 m)   Wt 99.8 kg (220 lb)   SpO2 97%   BMI 37.76 kg/m²       Physical Exam  Vitals and nursing note reviewed. Constitutional:       General: She is not in acute distress. Appearance: Normal appearance. She is normal weight. Comments: Mildly uncomfortable appearing however not in any distress and nontoxic. HENT:      Head: Normocephalic and atraumatic. Right Ear: External ear normal.      Left Ear: External ear normal.      Nose: Nose normal. No congestion or rhinorrhea. Mouth/Throat:      Mouth: Mucous membranes are moist.      Pharynx: Oropharynx is clear. No oropharyngeal exudate. Eyes:      Conjunctiva/sclera: Conjunctivae normal.      Pupils: Pupils are equal, round, and reactive to light. Cardiovascular:      Rate and Rhythm: Normal rate and regular rhythm. Pulses: Normal pulses. Heart sounds: Normal heart sounds. No murmur heard. Pulmonary:      Effort: Pulmonary effort is normal.      Breath sounds: Normal breath sounds. No wheezing, rhonchi or rales. Abdominal:      General: Abdomen is flat. Tenderness: There is abdominal tenderness (Mild to moderate primarily subjective tenderness in the suprapubic and periumbilical region). There is no guarding or rebound. Musculoskeletal:         General: No swelling or tenderness. Normal range of motion. Cervical back: Normal range of motion and neck supple. Right lower leg: No edema. Left lower leg: No edema. Skin:     General: Skin is warm and dry. Capillary Refill: Capillary refill takes less than 2 seconds. Findings: No rash.    Neurological:      General: No focal deficit present. Mental Status: She is alert. Cranial Nerves: No cranial nerve deficit. Sensory: No sensory deficit. Motor: No weakness. Diagnostic Study Results     Labs -  Recent Results (from the past 24 hour(s))   URINALYSIS W/ RFLX MICROSCOPIC    Collection Time: 04/27/22  1:30 PM   Result Value Ref Range    Color YELLOW      Appearance CLEAR      Specific gravity 1.008 1.005 - 1.030      pH (UA) 6.0 5.0 - 8.0      Protein Negative NEG mg/dL    Glucose Negative NEG mg/dL    Ketone Negative NEG mg/dL    Bilirubin Negative NEG      Blood MODERATE (A) NEG      Urobilinogen 0.2 0.2 - 1.0 EU/dL    Nitrites Negative NEG      Leukocyte Esterase TRACE (A) NEG     URINE MICROSCOPIC ONLY    Collection Time: 04/27/22  1:30 PM   Result Value Ref Range    WBC 0 to 3 0 - 4 /hpf    RBC 0 to 3 0 - 5 /hpf    Epithelial cells 1+ 0 - 5 /lpf   CBC WITH AUTOMATED DIFF    Collection Time: 04/27/22  1:34 PM   Result Value Ref Range    WBC 6.4 4.6 - 13.2 K/uL    RBC 5.24 4.20 - 5.30 M/uL    HGB 14.1 12.0 - 16.0 g/dL    HCT 43.9 35.0 - 45.0 %    MCV 83.8 78.0 - 100.0 FL    MCH 26.9 24.0 - 34.0 PG    MCHC 32.1 31.0 - 37.0 g/dL    RDW 15.3 (H) 11.6 - 14.5 %    PLATELET 723 415 - 094 K/uL    MPV 11.0 9.2 - 11.8 FL    NRBC 0.0 0  WBC    ABSOLUTE NRBC 0.00 0.00 - 0.01 K/uL    NEUTROPHILS 69 40 - 73 %    LYMPHOCYTES 23 21 - 52 %    MONOCYTES 7 3 - 10 %    EOSINOPHILS 1 0 - 5 %    BASOPHILS 0 0 - 2 %    IMMATURE GRANULOCYTES 0 0.0 - 0.5 %    ABS. NEUTROPHILS 4.4 1.8 - 8.0 K/UL    ABS. LYMPHOCYTES 1.5 0.9 - 3.6 K/UL    ABS. MONOCYTES 0.5 0.05 - 1.2 K/UL    ABS. EOSINOPHILS 0.1 0.0 - 0.4 K/UL    ABS. BASOPHILS 0.0 0.0 - 0.1 K/UL    ABS. IMM.  GRANS. 0.0 0.00 - 0.04 K/UL    DF AUTOMATED     METABOLIC PANEL, BASIC    Collection Time: 04/27/22  1:34 PM   Result Value Ref Range    Sodium 140 136 - 145 mmol/L    Potassium 5.1 3.5 - 5.5 mmol/L    Chloride 107 100 - 111 mmol/L    CO2 29 21 - 32 mmol/L    Anion gap 4 3.0 - 18 mmol/L    Glucose 95 74 - 99 mg/dL    BUN 19 (H) 7.0 - 18 MG/DL    Creatinine 1.14 0.6 - 1.3 MG/DL    BUN/Creatinine ratio 17 12 - 20      GFR est AA 56 (L) >60 ml/min/1.73m2    GFR est non-AA 46 (L) >60 ml/min/1.73m2    Calcium 8.6 8.5 - 10.1 MG/DL   LIPASE    Collection Time: 04/27/22  1:34 PM   Result Value Ref Range    Lipase 145 73 - 393 U/L   HEPATIC FUNCTION PANEL    Collection Time: 04/27/22  1:34 PM   Result Value Ref Range    Protein, total 7.8 6.4 - 8.2 g/dL    Albumin 3.3 (L) 3.4 - 5.0 g/dL    Globulin 4.5 (H) 2.0 - 4.0 g/dL    A-G Ratio 0.7 (L) 0.8 - 1.7      Bilirubin, total 0.7 0.2 - 1.0 MG/DL    Bilirubin, direct <0.1 0.0 - 0.2 MG/DL    Alk. phosphatase 83 45 - 117 U/L    AST (SGOT) 48 (H) 10 - 38 U/L    ALT (SGPT) 24 13 - 56 U/L         Radiologic Studies -   CT ABD PELV W CONT   Final Result   8 mm left proximal ureteral stone with mild hydronephrosis. Small sliding hiatal hernia. Similar left adrenal adenoma. Degenerative changes. Medical Decision Making   I am the first provider for this patient. I reviewed the vital signs, available nursing notes, past medical history, past surgical history, family history and social history. Vital Signs-Reviewed the patient's vital signs. Records Reviewed: Nursing Notes, Old Medical Records, Previous Radiology Studies and Previous Laboratory Studies (Time of Review: 2:28 PM)      Provider Notes (Medical Decision Making):   MDM  Number of Diagnoses or Management Options  Adrenal adenoma, left  Kidney stone  Diagnosis management comments: Pleasant 41-year-old female presenting with complaints of abdominal pain and nausea.     DDX: appendicitis, diverticulitis, renal colic, UTI, cholecystitis, biliary colic, constipation, IBS, GI bleed, PUD, perforated bowel, bowel obstruction, bowel ischemia, pancreatitis, hepatitis, inguinal hernia, incarcerated hernia, less likely cardiac etiology    Results reviewed and patient reassessed. The CT shows a left-sided obstructing ureteral stone with mild hydronephrosis. I reviewed the patient's results after patient indicated she is seen a urologist for past issues although she did not endorse having kidney stones. Looks like there was question if she had an 8 mm stone back in 2016 which was found on a lumbar x-ray although a CT follow that up and that showed more of a calcification so I do question if this could be a calcification and not really a stone and is not the cause of her pain although she does have mild hydronephrosis so I will presume that this is a stone although coincidences somewhat particularly her with the previous issues. It does not likely be the same stone this months time later especially with no renal atrophy or other chronic looking changes. I spoke to urology, NAVEED Howell, who felt patient was suitable for outpatient follow-up. Patient's pain is resolved and she is feeling great now, so I do not think that is terribly unreasonable. Her urine does not really show any signs of infection although Paula Howell requested a urine culture be added on. Recommended against prophylactic antibiotics at this time. Gave patient strict return precautions for development of fever, vomiting, worsening or severe pain or any changes in her symptoms in the meantime. Patient expresses understanding of the discharge plan and all questions answered. She is stable for discharge home at this time. At this time, patient is stable and appropriate for discharge home.  Patient demonstrates understanding of current diagnoses and is in agreement with the treatment plan. Floydene Fake are advised that while the likelihood of serious underlying condition is low at this point given the evaluation performed today, we cannot fully rule it out. Floydene Fake are advised to immediately return with any new symptoms or worsening of current condition.  Any Incidental findings were noted on the patient's discharge paperwork as well as verbally directly to the patient, and the appropriate follow-up was given to the patient as far as instructions on testing needed as well as the timeframe.  All questions have been answered. Brody Leyva is given educational material regarding their diagnoses, including danger symptoms and when to return to the ED. This note was dictated utilizing Dragon voice recognition software. Unfortunately this leads to occasional typographical errors. I apologize in advance if the situation occurs. If questions occur please do not hesitate to contact me directly. Krissy Carlton DO          ED Course: Progress Notes, Reevaluation, and Consults:  ED Course as of 04/27/22 1809   Wed Apr 27, 2022   1622 Discussed with urology PA Kai Ramirez, advised suitable for outpatient follow-up. Advised to give strict return precautions, urine culture added on, advised against prophylactic antibiotics at this time. [EDUARDO]      ED Course User Index  [EDUARDO] Akanksha Cheatham DO       Procedures    Critical Care Time: N/a    Diagnosis     Clinical Impression:   1. Kidney stone    2. Adrenal adenoma, left        Disposition: Discharge    Follow-up Information     Follow up With Specialties Details Why Contact Info    Ayana Hope MD Urology Call today  [address]  [phone]             Discharge Medication List as of 4/27/2022  5:32 PM      START taking these medications    Details   ketorolac (TORADOL) 10 mg tablet Take 1 Tablet by mouth every eight to twelve (8-12) hours as needed for Pain (Mild to Moderate;) for up to 5 days. Take with food., Normal, Disp-12 Tablet, R-0      ondansetron (ZOFRAN ODT) 4 mg disintegrating tablet Take 1 Tablet by mouth every eight (8) hours as needed for Nausea or Vomiting for up to 3 days. , Normal, Disp-6 Tablet, R-0      HYDROcodone-acetaminophen (Norco) 5-325 mg per tablet Take 1 Tablet by mouth every six (6) hours as needed for Pain (Severe pain) for up to 3 days. Max Daily Amount: 4 Tablets., Normal, Disp-10 Tablet, R-0      tamsulosin (Flomax) 0.4 mg capsule Take 1 Capsule by mouth daily for 10 days. , Normal, Disp-10 Capsule, R-0         CONTINUE these medications which have NOT CHANGED    Details   aspirin delayed-release (ADULT LOW DOSE ASPIRIN) 81 mg tablet Take 1 Tab by mouth daily. , OTC, Disp-30 Tab, R-prn      cholecalciferol (VITAMIN D3) 1,000 unit cap Take 1,000 Units by mouth every other day., Historical Med             Supa Ramos DO   Emergency Medicine   April 27, 2022, 2:28 PM     This note is dictated utilizing Dragon voice recognition software. Unfortunately this leads to occasional typographical errors using the voice recognition. I apologize in advance if the situation occurs. If questions occur please do not hesitate to contact me directly. Patient was seen  and treated during the context of the COVID-19 pandemic. Contemporary protocols utilized based on the best available evidence, utilizing evolving public health  guidelines and treatment protocols.     Supa Ramos DO

## 2022-04-27 NOTE — TELEPHONE ENCOUNTER
Patient called and said that she was vomiting on Monday yesterday she was feeling sluggish, and today she is having pain in her back and side and still feeling sluggish with nausea. I made her a virtual appointment for Friday but she would still like to be called bu the nurse.  Please advise

## 2022-04-27 NOTE — ED TRIAGE NOTES
Pt.AOx4, steady gait, NAD, c/o nausea and vomiting after Sunday dinner, pt.  Thought she had food poisoning but says she didn't think it would last this long, no longer vomiting but continues to have nausea, c/o fatigue as well, denies pertinent medical hx

## 2022-04-27 NOTE — DISCHARGE INSTRUCTIONS
1.  Take medication as prescribed. For moderate pain in use the ketorolac. You can take up to every 8 hours as needed for more mild or moderate pain. You should take it with food as it may cause stomach upset. 2.  Follow-up with urology. Given you a couple of names to contact. You may try to see your previous urologist, Dr. Crystal Aggarwal, or contact the office listed on your discharge instructions for an alternative urologist or he can get an appointment. They should be contacting you for follow-up. 3.  If you develop a fever, frequent vomiting, tractable severe abdominal pain, altered mental status, low blood pressure or any worsening you should return immediately.

## 2022-04-30 LAB
BACTERIA SPEC CULT: ABNORMAL
BACTERIA SPEC CULT: ABNORMAL
CC UR VC: ABNORMAL
SERVICE CMNT-IMP: ABNORMAL

## 2022-05-24 ENCOUNTER — OFFICE VISIT (OUTPATIENT)
Dept: FAMILY MEDICINE CLINIC | Age: 77
End: 2022-05-24
Payer: MEDICARE

## 2022-05-24 DIAGNOSIS — N20.0 NEPHROLITHIASIS: Primary | ICD-10-CM

## 2022-05-24 PROCEDURE — 1123F ACP DISCUSS/DSCN MKR DOCD: CPT | Performed by: FAMILY MEDICINE

## 2022-05-24 PROCEDURE — G8536 NO DOC ELDER MAL SCRN: HCPCS | Performed by: FAMILY MEDICINE

## 2022-05-24 PROCEDURE — 1090F PRES/ABSN URINE INCON ASSESS: CPT | Performed by: FAMILY MEDICINE

## 2022-05-24 PROCEDURE — G8417 CALC BMI ABV UP PARAM F/U: HCPCS | Performed by: FAMILY MEDICINE

## 2022-05-24 PROCEDURE — G8399 PT W/DXA RESULTS DOCUMENT: HCPCS | Performed by: FAMILY MEDICINE

## 2022-05-24 PROCEDURE — G8753 SYS BP > OR = 140: HCPCS | Performed by: FAMILY MEDICINE

## 2022-05-24 PROCEDURE — 1101F PT FALLS ASSESS-DOCD LE1/YR: CPT | Performed by: FAMILY MEDICINE

## 2022-05-24 PROCEDURE — G8755 DIAS BP > OR = 90: HCPCS | Performed by: FAMILY MEDICINE

## 2022-05-24 PROCEDURE — G8427 DOCREV CUR MEDS BY ELIG CLIN: HCPCS | Performed by: FAMILY MEDICINE

## 2022-05-24 PROCEDURE — G8510 SCR DEP NEG, NO PLAN REQD: HCPCS | Performed by: FAMILY MEDICINE

## 2022-05-24 PROCEDURE — 99213 OFFICE O/P EST LOW 20 MIN: CPT | Performed by: FAMILY MEDICINE

## 2022-05-24 PROCEDURE — 3017F COLORECTAL CA SCREEN DOC REV: CPT | Performed by: FAMILY MEDICINE

## 2022-05-24 NOTE — PROGRESS NOTES
Bernie Damon presents today for   Chief Complaint   Patient presents with   [facility] ED Follow-up     4/27/22 - [facility]  ER visit  for abdominal pain  nausea and vomiting - patient was found to have kidney stone will see urology 6/3 /22        Is someone accompanying this pt? No     Is the patient using any DME equipment during OV? No     Depression Screening:  3 most recent PHQ Screens 5/24/2022   Little interest or pleasure in doing things Not at all   Feeling down, depressed, irritable, or hopeless Not at all   Total Score PHQ 2 0       Learning Assessment:  Learning Assessment 5/5/2021   PRIMARY LEARNER Patient   HIGHEST LEVEL OF EDUCATION - PRIMARY LEARNER  SOME COLLEGE   BARRIERS PRIMARY LEARNER NONE   CO-LEARNER CAREGIVER No   PRIMARY LANGUAGE ENGLISH   LEARNER PREFERENCE PRIMARY LISTENING   ANSWERED BY patient    RELATIONSHIP SELF       Abuse Screening:  Abuse Screening Questionnaire 1/10/2022   Do you ever feel afraid of your partner? N   Are you in a relationship with someone who physically or mentally threatens you? N   Is it safe for you to go home? Y       Fall Screening  Fall Risk Assessment, last 12 mths 5/24/2022   Able to walk? Yes   Fall in past 12 months? 0   Do you feel unsteady? 0   Are you worried about falling 0       Generalized Anxiety  No flowsheet data found. Health Maintenance Due   Topic Date Due    Shingrix Vaccine Age 49> (1 of 2) Never done    COVID-19 Vaccine (3 - Booster for [employer] series) 09/28/2021   . Health Maintenance reviewed and discussed and ordered per Provider. Vaccines Due   Screenings Due       Bernie Damon is updated on all HM    1. \"Have you been to the ER, urgent care clinic since your last visit? Hospitalized since your last visit? \" Yes When: 4/27 Where: [facility]  Reason for visit: abd pain     2. \"Have you seen or consulted any other health care providers outside of the [address] [place] [address] since your last visit? \" Yes [facility]: [facility]  Reason for visit: oncology       3. For patients aged 39-70: Has the patient had a colonoscopy / FIT/ Cologuard? NA - based on age     If the patient is female:    4. For patients aged 41-77: Has the patient had a mammogram within the past 2 years? NA - based on age    11. For patients aged 21-65: Has the patient had a pap smear?  NA - based on age

## 2022-05-24 NOTE — PROGRESS NOTES
Chief Complaint   Patient presents with   [facility] ED Follow-up     4/27/22 - [facility]  ER visit  for abdominal pain  nausea and vomiting - patient was found to have kidney stone will see urology 6/3 /22      Subjective  Antonette Biggs is a 77 y.o. female. HPI   Pt here for ER f/u. She was seen on 4/27/22 and dx with kidney stones. She initially thought she had food poisoning but she sought care when her sx persisted. Pt took the flomax for 10 days as rx'ed by the ER. She has not had any further sx. She was not aware when the stone passed. Review of Systems   Constitutional: Negative. HENT: Negative. Respiratory: Negative. Cardiovascular: Negative. All other systems reviewed and are negative. Objective  Physical Exam  Vitals and nursing note reviewed. Constitutional:       Appearance: Normal appearance. She is not ill-appearing. HENT:      Head: Normocephalic and atraumatic. Right Ear: External ear normal.      Left Ear: External ear normal.      Nose: Nose normal.      Mouth/Throat:      Mouth: Mucous membranes are moist.   Eyes:      Extraocular Movements: Extraocular movements intact. Conjunctiva/sclera: Conjunctivae normal.   Cardiovascular:      Rate and Rhythm: Normal rate and regular rhythm. Heart sounds: No murmur heard. No friction rub. No gallop. Pulmonary:      Effort: Pulmonary effort is normal.      Breath sounds: Normal breath sounds. No wheezing, rhonchi or rales. Musculoskeletal:         General: Normal range of motion. Cervical back: Normal range of motion. Skin:     General: Skin is warm and dry. Neurological:      Mental Status: She is alert and oriented to person, place, and time. Coordination: Coordination normal.   Psychiatric:         Mood and Affect: Mood normal.         Behavior: Behavior normal.         Thought Content:  Thought content normal.         Judgment: Judgment normal.          Assessment & Plan  Diagnoses and all orders for this visit: 1. Nephrolithiasis  Appears resolved. Follow-up and Dispositions    · Return if symptoms worsen or fail to improve.        Raheem Don MD

## 2022-05-25 VITALS
DIASTOLIC BLOOD PRESSURE: 83 MMHG | RESPIRATION RATE: 16 BRPM | HEART RATE: 66 BPM | WEIGHT: 211.4 LBS | SYSTOLIC BLOOD PRESSURE: 139 MMHG | OXYGEN SATURATION: 98 % | BODY MASS INDEX: 36.29 KG/M2

## 2022-06-30 ENCOUNTER — HOSPITAL ENCOUNTER (OUTPATIENT)
Dept: CT IMAGING | Age: 77
Discharge: HOME | End: 2022-06-30
Attending: PHYSICIAN ASSISTANT
Payer: MEDICARE

## 2022-06-30 DIAGNOSIS — N20.0 NEPHROLITHIASIS: ICD-10-CM

## 2022-06-30 PROCEDURE — 74176 CT ABD & PELVIS W/O CONTRAST: CPT

## 2022-06-30 NOTE — PROGRESS NOTES
CT shows large 10 mm stone in left ureter and a 3 mm stone lodged in the ureter right below that stone which is causing swelling of the kidney. Reviewed images with Dr. Suhail Almaraz. We discussed URS in office if stone was still present, I recommend we schedule that surgery. If amenable I will put surgery letter in and if she wants to discuss more, we can get her scheduled to discuss further.

## 2022-08-09 ENCOUNTER — TELEPHONE (OUTPATIENT)
Dept: FAMILY MEDICINE CLINIC | Age: 77
End: 2022-08-09

## 2022-08-09 NOTE — TELEPHONE ENCOUNTER
Ecc transfer due to patient mentioning dizziness .      Patient states she had dizziness 7/31/22  and would like check up states she feels some slight dizziness when she lays down on left side , patient scheduled for 8/15/22

## 2022-08-15 ENCOUNTER — OFFICE VISIT (OUTPATIENT)
Dept: FAMILY MEDICINE CLINIC | Age: 77
End: 2022-08-15
Payer: MEDICARE

## 2022-08-15 VITALS
RESPIRATION RATE: 16 BRPM | SYSTOLIC BLOOD PRESSURE: 127 MMHG | BODY MASS INDEX: 35.15 KG/M2 | HEART RATE: 63 BPM | TEMPERATURE: 98.1 F | WEIGHT: 204.8 LBS | DIASTOLIC BLOOD PRESSURE: 84 MMHG | OXYGEN SATURATION: 97 %

## 2022-08-15 DIAGNOSIS — H81.10 BENIGN PAROXYSMAL POSITIONAL VERTIGO, UNSPECIFIED LATERALITY: Primary | ICD-10-CM

## 2022-08-15 PROCEDURE — 1101F PT FALLS ASSESS-DOCD LE1/YR: CPT | Performed by: FAMILY MEDICINE

## 2022-08-15 PROCEDURE — G8752 SYS BP LESS 140: HCPCS | Performed by: FAMILY MEDICINE

## 2022-08-15 PROCEDURE — G8399 PT W/DXA RESULTS DOCUMENT: HCPCS | Performed by: FAMILY MEDICINE

## 2022-08-15 PROCEDURE — 1090F PRES/ABSN URINE INCON ASSESS: CPT | Performed by: FAMILY MEDICINE

## 2022-08-15 PROCEDURE — G8417 CALC BMI ABV UP PARAM F/U: HCPCS | Performed by: FAMILY MEDICINE

## 2022-08-15 PROCEDURE — G8536 NO DOC ELDER MAL SCRN: HCPCS | Performed by: FAMILY MEDICINE

## 2022-08-15 PROCEDURE — 1123F ACP DISCUSS/DSCN MKR DOCD: CPT | Performed by: FAMILY MEDICINE

## 2022-08-15 PROCEDURE — G8754 DIAS BP LESS 90: HCPCS | Performed by: FAMILY MEDICINE

## 2022-08-15 PROCEDURE — G8432 DEP SCR NOT DOC, RNG: HCPCS | Performed by: FAMILY MEDICINE

## 2022-08-15 PROCEDURE — 99213 OFFICE O/P EST LOW 20 MIN: CPT | Performed by: FAMILY MEDICINE

## 2022-08-15 PROCEDURE — 3017F COLORECTAL CA SCREEN DOC REV: CPT | Performed by: FAMILY MEDICINE

## 2022-08-15 PROCEDURE — G8427 DOCREV CUR MEDS BY ELIG CLIN: HCPCS | Performed by: FAMILY MEDICINE

## 2022-08-15 NOTE — PROGRESS NOTES
Chief Complaint   Patient presents with    Dizziness     On 7/30 patient had episode of dizziness and nausea  . She believes it may be vertigo . When she lays on left side she feels a little woozy  at times and off balance at times as well since then . Subjective  Qing Kiser is a 77 y.o. female. HPI  Pt reports that she had dizziness with nausea on 7/30/22. It was bothersome that day; she felt like she was spinning. Since then, she has noticed that she has sx when she turns over in bed from her R side to her L side. She is fine now unless she makes that position change. Review of Systems   Constitutional: Negative. HENT: Negative. Respiratory: Negative. Cardiovascular: Negative. Neurological:  Positive for dizziness. All other systems reviewed and are negative. Objective  Physical Exam  Vitals and nursing note reviewed. Constitutional:       Appearance: Normal appearance. She is not ill-appearing. HENT:      Head: Normocephalic and atraumatic. Right Ear: External ear normal.      Left Ear: External ear normal.      Nose: Nose normal.      Mouth/Throat:      Mouth: Mucous membranes are moist.   Eyes:      Extraocular Movements: Extraocular movements intact. Conjunctiva/sclera: Conjunctivae normal.   Cardiovascular:      Rate and Rhythm: Normal rate and regular rhythm. Heart sounds: No murmur heard. No friction rub. No gallop. Pulmonary:      Effort: Pulmonary effort is normal.      Breath sounds: Normal breath sounds. No wheezing, rhonchi or rales. Musculoskeletal:         General: Normal range of motion. Cervical back: Normal range of motion. Skin:     General: Skin is warm and dry. Neurological:      Mental Status: She is alert and oriented to person, place, and time. Coordination: Coordination normal.   Psychiatric:         Mood and Affect: Mood normal.         Behavior: Behavior normal.         Thought Content:  Thought content normal. Judgment: Judgment normal.        Assessment & Plan  Diagnoses and all orders for this visit:    1. Benign paroxysmal positional vertigo, unspecified laterality  Pt ed re: dx.  Pt reassured. Follow-up and Dispositions    Return for medicare wellness visit.        Lincoln Segovia MD

## 2022-08-15 NOTE — PROGRESS NOTES
Anny Ortiz presents today for   Chief Complaint   Patient presents with    Dizziness     On 7/30 patient had episode of dizziness and nausea  . She believes it may be vertigo . When she lays on left side she feels a little woozy  at times and off balance at times as well since then . Anny Ortiz preferred language for health care discussion is english/other. Is someone accompanying this pt? NO    Is the patient using any DME equipment during OV? No     Depression Screening:  3 most recent PHQ Screens 5/24/2022   Little interest or pleasure in doing things Not at all   Feeling down, depressed, irritable, or hopeless Not at all   Total Score PHQ 2 0       Learning Assessment:  Learning Assessment 5/5/2021   PRIMARY LEARNER Patient   HIGHEST LEVEL OF EDUCATION - PRIMARY LEARNER  SOME COLLEGE   BARRIERS PRIMARY LEARNER NONE   CO-LEARNER CAREGIVER No   PRIMARY LANGUAGE ENGLISH   LEARNER PREFERENCE PRIMARY LISTENING   ANSWERED BY patient    RELATIONSHIP SELF       Abuse Screening:  Abuse Screening Questionnaire 1/10/2022   Do you ever feel afraid of your partner? N   Are you in a relationship with someone who physically or mentally threatens you? N   Is it safe for you to go home? Y       Generalized Anxiety  No flowsheet data found. Health Maintenance Due   Topic Date Due    Shingrix Vaccine Age 49> (1 of 2) Never done    COVID-19 Vaccine (3 - Booster for "[employer] series) 09/28/2021    Medicare Yearly Exam  07/09/2022   . Health Maintenance reviewed and discussed and ordered per Provider. VACCINES DUE   SCREENINGS DUE       Anny Perezutkarrie is updated on all HM    1. \"Have you been to the ER, urgent care clinic since your last visit? Hospitalized since your last visit? \" No    2. \"Have you seen or consulted any other health care providers outside of the [address] [place] [address] since your last visit? \" No     3. For patients aged 39-70: Has the patient had a colonoscopy / FIT/ Cologuard?  Yes - no Care Gap present     If the patient is female:    4. For patients aged 41-77: Has the patient had a mammogram within the past 2 years? Yes - no Care Gap present    5. For patients aged 21-65: Has the patient had a pap smear?  Yes - no Care Gap present

## 2022-09-02 ENCOUNTER — VIRTUAL VISIT (OUTPATIENT)
Dept: FAMILY MEDICINE CLINIC | Age: 77
End: 2022-09-02
Payer: MEDICARE

## 2022-09-02 DIAGNOSIS — Z00.00 MEDICARE ANNUAL WELLNESS VISIT, SUBSEQUENT: Primary | ICD-10-CM

## 2022-09-02 DIAGNOSIS — Z71.89 ACP (ADVANCE CARE PLANNING): ICD-10-CM

## 2022-09-02 PROCEDURE — 99497 ADVNCD CARE PLAN 30 MIN: CPT | Performed by: FAMILY MEDICINE

## 2022-09-02 PROCEDURE — G8427 DOCREV CUR MEDS BY ELIG CLIN: HCPCS | Performed by: FAMILY MEDICINE

## 2022-09-02 PROCEDURE — 1123F ACP DISCUSS/DSCN MKR DOCD: CPT | Performed by: FAMILY MEDICINE

## 2022-09-02 PROCEDURE — 1101F PT FALLS ASSESS-DOCD LE1/YR: CPT | Performed by: FAMILY MEDICINE

## 2022-09-02 PROCEDURE — 3017F COLORECTAL CA SCREEN DOC REV: CPT | Performed by: FAMILY MEDICINE

## 2022-09-02 PROCEDURE — G8432 DEP SCR NOT DOC, RNG: HCPCS | Performed by: FAMILY MEDICINE

## 2022-09-02 PROCEDURE — G8399 PT W/DXA RESULTS DOCUMENT: HCPCS | Performed by: FAMILY MEDICINE

## 2022-09-02 PROCEDURE — G8756 NO BP MEASURE DOC: HCPCS | Performed by: FAMILY MEDICINE

## 2022-09-02 PROCEDURE — G0439 PPPS, SUBSEQ VISIT: HCPCS | Performed by: FAMILY MEDICINE

## 2022-09-02 NOTE — PROGRESS NOTES
This is the Subsequent Medicare Annual Wellness Exam, performed 12 months or more after the Initial AWV or the last Subsequent AWV    I have reviewed the patient's medical history in detail and updated the computerized patient record. Assessment/Plan   Education and counseling provided:  End-of-Life planning (with patient's consent)  Influenza Vaccine  Shingles and covid boosters recommended. 1. Medicare annual wellness visit, subsequent  2. ACP (advance care planning)     Depression Risk Factor Screening:     3 most recent PHQ Screens 5/24/2022   Little interest or pleasure in doing things Not at all   Feeling down, depressed, irritable, or hopeless Not at all   Total Score PHQ 2 0       Alcohol & Drug Abuse Risk Screen    Do you average more than 1 drink per night or more than 7 drinks a week:no       On any one occasion in the past three months have you have had more than 3 drinks containing alcohol:  No           Functional Ability and Level of Safety:    Hearing: Hearing is good. Activities of Daily Living: The home contains: no safety equipment. Patient does total self care      Ambulation: with no difficulty     Fall Risk:  Fall Risk Assessment, last 12 mths 5/24/2022   Able to walk? Yes   Fall in past 12 months? 0   Do you feel unsteady? 0   Are you worried about falling 0      Abuse Screen:  Patient is not abused       Cognitive Screening    Has your family/caregiver stated any concerns about your memory: no    Cognitive Screening: .     Health Maintenance Due     Health Maintenance Due   Topic Date Due    Shingrix Vaccine Age 49> (1 of 2) Never done    COVID-19 Vaccine (3 - Booster for [employer] series) 09/28/2021    Medicare Yearly Exam  07/09/2022    Flu Vaccine (1) 09/01/2022       Patient Care Team   Patient Care Team:  Lizzie Rocha MD as PCP - General (Family Medicine)  Lizzie Rocha MD as PCP - [facility] Empaneled Provider  Layne Evans MD (Oncology)  Adan Singh MD as Physician (Urology)  Day Menjivar DO (Cardiovascular Disease Physician)  Diana Gilliam MD (Orthopedic Surgery)  Dagoberto Castañeda MD as Consulting Provider (Physical Medicine and Rehabilitation Physician)    History     Patient Active Problem List   Diagnosis Code    AGE (acute gastroenteritis) K52.9    Colon polyp, colonoscopy, 1/2011 K63.5    Elevated blood pressure [id number]    Breast pain, left N64.4    Abnormal EKG R94.31    ACP (advance care planning) Z71.89    Osteoarthritis of lumbar spine M47.816    Tubular adenoma of colon, colonscopy 5/2016 D12.6    Abnormal echocardiogram R93.1    Hypertension I10    Dyslipidemia E78.5    Severe obesity (BMI 35.0-39. 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    Pain in joint M25.50    Vitamin D deficiency E55.9    Acute pain of left shoulder M25.512    Acute pain of left knee M25.562    Nephrolithiasis N20.0     Past Medical History:   Diagnosis Date    Breast mass     right,  excisional biopsy 1/15,  atypical ductal hyperplasia (PATH 1/15)    Colon polyps     Hemorrhoids     Nephrolithiasis 5/24/2022      Past Surgical History:   Procedure Laterality Date    COLONOSCOPY N/A 5/19/2016    COLONOSCOPY with polypectomy performed by Jessica Dong MD at [facility] ENDOSCOPY    COLONOSCOPY N/A 2/13/2018    COLONOSCOPY / polypectomy performed by Pamela Haskins MD at [facility] ENDOSCOPY    HX HYSTERECTOMY      [place] BREAST SURGERY PROCEDURE UNLISTED      biopsy, uncertain of which side     Current Outpatient Medications   Medication Sig Dispense Refill    aspirin delayed-release 81 mg tablet Take 1 Tab by mouth daily. 30 Tab prn    cholecalciferol (VITAMIN D3) 1,000 unit cap Take 1,000 Units by mouth every other day.        No Known Allergies    Family History   Problem Relation Age of Onset    Hypertension Mother     Prostate Cancer Father      Social History     Tobacco Use    Smoking status: Former     Years: 13.00     Types: Cigarettes    Smokeless tobacco: Never    Tobacco comments:     occasional, stopped at age 37   Substance Use Topics    Alcohol use: No       Rafi Ards, was evaluated through a synchronous (real-time) audio-video encounter. The patient (or guardian if applicable) is aware that this is a billable service, which includes applicable co-pays. This Virtual Visit was conducted with patient's (and/or legal guardian's) consent. The visit was conducted pursuant to the emergency declaration under the [address] [place] [address] authority and the [employer] and [employer] General Act. Patient identification was verified, and a caregiver was present when appropriate. The patient was located at: Home: [address] [place] [address]  The provider was located at:  Facility (Appt Department): [address] [place] [address]  [phone]       Scot Meléndez LPN      Signed By: Allen Soto MD     September 2, 2022

## 2022-09-02 NOTE — PATIENT INSTRUCTIONS
Medicare Wellness Visit, Female     The best way to live healthy is to have a lifestyle where you eat a well-balanced diet, exercise regularly, limit alcohol use, and quit all forms of tobacco/nicotine, if applicable. Regular preventive services are another way to keep healthy. Preventive services (vaccines, screening tests, monitoring & exams) can help personalize your care plan, which helps you manage your own care. Screening tests can find health problems at the earliest stages, when they are easiest to treat. [facility] follows the current, evidence-based guidelines published by the [facility] Swanson ([facility]) when recommending preventive services for our patients. Because we follow these guidelines, sometimes recommendations change over time as research supports it. (For example, mammograms used to be recommended annually. Even though Medicare will still pay for an annual mammogram, the newer guidelines recommend a mammogram every two years for women of average risk). Of course, you and your doctor may decide to screen more often for some diseases, based on your risk and your co-morbidities (chronic disease you are already diagnosed with). Preventive services for you include:  - Medicare offers their members a free annual wellness visit, which is time for you and your primary care provider to discuss and plan for your preventive service needs. Take advantage of this benefit every year!  -All adults over the age of 72 should receive the recommended pneumonia vaccines. Current USPSTF guidelines recommend a series of two vaccines for the best pneumonia protection.   -All adults should have a flu vaccine yearly and a tetanus vaccine every 10 years.   -All adults age 48 and older should receive the shingles vaccines (series of two vaccines).       -All adults age 38-68 who are overweight should have a diabetes screening test once every three years.   -All adults born between [birth year] and 1965 should be screened once for Hepatitis C.  -Other screening tests and preventive services for persons with diabetes include: an eye exam to screen for diabetic retinopathy, a kidney function test, a foot exam, and stricter control over your cholesterol.   -Cardiovascular screening for adults with routine risk involves an electrocardiogram (ECG) at intervals determined by your doctor.   -Colorectal cancer screenings should be done for adults age 54-65 with no increased risk factors for colorectal cancer. There are a number of acceptable methods of screening for this type of cancer. Each test has its own benefits and drawbacks. Discuss with your doctor what is most appropriate for you during your annual wellness visit. The different tests include: colonoscopy (considered the best screening method), a fecal occult blood test, a fecal DNA test, and sigmoidoscopy.    -A bone mass density test is recommended when a woman turns 65 to screen for osteoporosis. This test is only recommended one time, as a screening. Some providers will use this same test as a disease monitoring tool if you already have osteoporosis. -Breast cancer screenings are recommended every other year for women of normal risk, age 54-69.  -Cervical cancer screenings for women over age 72 are only recommended with certain risk factors.      Here is a list of your current Health Maintenance items (your personalized list of preventive services) with a due date:  Health Maintenance Due   Topic Date Due    Shingles Vaccine (1 of 2) Never done    COVID-19 Vaccine (3 - Booster for [employer] series) 09/28/2021    Annual Well Visit  07/09/2022    Yearly Flu Vaccine (1) 09/01/2022

## 2022-09-02 NOTE — PROGRESS NOTES
Advance Care Planning     Advance Care Planning (ACP) Physician/NP/PA Conversation      Date of Conversation: 9/2/2022  Conducted with: Patient with Decision Making Capacity    Healthcare Decision Maker:     Primary Decision Maker: [facility] - [phone]    Secondary Decision Maker: Radha Nagel - [phone]  Click here to complete [address] including selection of the Healthcare Decision Maker Relationship (ie \"Primary\")      Care Preferences:    Hospitalization: \"If your health worsens and it becomes clear that your chance of recovery is unlikely, what would be your preference regarding hospitalization? \"  The patient would prefer comfort-focused treatment without hospitalization. Ventilation: \"If you were unable to breathe on your own and your chance of recovery was unlikely, what would be your preference about the use of a ventilator (breathing machine) if it was available to you? \"   The patient would desire the use of a ventilator. Resuscitation: \"In the event your heart stopped as a result of an underlying serious health condition, would you want attempts to be made to restart your heart, or would you prefer a natural death? \"   Yes, attempt to resuscitate.       Conversation Outcomes / Follow-Up Plan:   ACP in process - information provided, considering goals and options  Reviewed DNR/DNI and patient elects Full Code (Attempt Resuscitation)     Length of Voluntary ACP Conversation in minutes:  16 minutes    Alcira Day MD

## 2022-10-10 ENCOUNTER — OFFICE VISIT (OUTPATIENT)
Dept: CARDIOLOGY CLINIC | Age: 77
End: 2022-10-10
Payer: MEDICARE

## 2022-10-10 VITALS
OXYGEN SATURATION: 97 % | SYSTOLIC BLOOD PRESSURE: 128 MMHG | BODY MASS INDEX: 35.68 KG/M2 | WEIGHT: 209 LBS | DIASTOLIC BLOOD PRESSURE: 78 MMHG | HEIGHT: 64 IN | HEART RATE: 72 BPM

## 2022-10-10 DIAGNOSIS — R07.9 CHEST PAIN, UNSPECIFIED TYPE: Primary | ICD-10-CM

## 2022-10-10 PROCEDURE — 3017F COLORECTAL CA SCREEN DOC REV: CPT | Performed by: INTERNAL MEDICINE

## 2022-10-10 PROCEDURE — G8536 NO DOC ELDER MAL SCRN: HCPCS | Performed by: INTERNAL MEDICINE

## 2022-10-10 PROCEDURE — 1090F PRES/ABSN URINE INCON ASSESS: CPT | Performed by: INTERNAL MEDICINE

## 2022-10-10 PROCEDURE — G8399 PT W/DXA RESULTS DOCUMENT: HCPCS | Performed by: INTERNAL MEDICINE

## 2022-10-10 PROCEDURE — 1123F ACP DISCUSS/DSCN MKR DOCD: CPT | Performed by: INTERNAL MEDICINE

## 2022-10-10 PROCEDURE — 1101F PT FALLS ASSESS-DOCD LE1/YR: CPT | Performed by: INTERNAL MEDICINE

## 2022-10-10 PROCEDURE — 99212 OFFICE O/P EST SF 10 MIN: CPT | Performed by: INTERNAL MEDICINE

## 2022-10-10 PROCEDURE — G8417 CALC BMI ABV UP PARAM F/U: HCPCS | Performed by: INTERNAL MEDICINE

## 2022-10-10 PROCEDURE — G8754 DIAS BP LESS 90: HCPCS | Performed by: INTERNAL MEDICINE

## 2022-10-10 PROCEDURE — G8752 SYS BP LESS 140: HCPCS | Performed by: INTERNAL MEDICINE

## 2022-10-10 PROCEDURE — G8510 SCR DEP NEG, NO PLAN REQD: HCPCS | Performed by: INTERNAL MEDICINE

## 2022-10-10 PROCEDURE — G8428 CUR MEDS NOT DOCUMENT: HCPCS | Performed by: INTERNAL MEDICINE

## 2022-10-10 NOTE — LETTER
10/10/2022    Patient: Héctor Seaman   YOB: 1945   Date of Visit: 10/10/2022     Fe Titus MD  [id number]  [phone]  [facility]    Dear Fe Titus MD,      Thank you for referring Ms. Héctor Seaman to [facility] for evaluation. My notes for this consultation are attached. If you have questions, please do not hesitate to call me. I look forward to following your patient along with you.       Sincerely,    Lauren Velez MD

## 2022-10-10 NOTE — PROGRESS NOTES
Fabi Miranda presents today for   Chief Complaint   Patient presents with    Follow-up     9 month       Fabi Miranda preferred language for health care discussion is english/other. Is someone accompanying this pt? no    Is the patient using any DME equipment during [address]? no    Depression Screening:  3 most recent PHQ Screens 10/10/2022   Little interest or pleasure in doing things Not at all   Feeling down, depressed, irritable, or hopeless Not at all   Total Score PHQ 2 0       Learning Assessment:  Learning Assessment 10/10/2022   PRIMARY LEARNER Patient   HIGHEST LEVEL OF EDUCATION - PRIMARY LEARNER  -   BARRIERS PRIMARY LEARNER -   CO-LEARNER CAREGIVER -   PRIMARY LANGUAGE ENGLISH   LEARNER PREFERENCE PRIMARY DEMONSTRATION   ANSWERED BY patient   RELATIONSHIP SELF       Abuse Screening:  Abuse Screening Questionnaire 10/10/2022   Do you ever feel afraid of your partner? N   Are you in a relationship with someone who physically or mentally threatens you? N   Is it safe for you to go home? Y       Fall Risk  Fall Risk Assessment, last 12 mths 10/10/2022   Able to walk? Yes   Fall in past 12 months? 0   Do you feel unsteady? 0   Are you worried about falling 0           Pt currently taking Anticoagulant therapy? no    Pt currently taking Antiplatelet therapy ? Aspirin 81 mg daily      Coordination of Care:  1. Have you been to the ER, urgent care clinic since your last visit? Hospitalized since your last visit? no    2. Have you seen or consulted any other health care providers outside of the [address] [place] [address] since your last visit? Include any pap smears or colon screening.  no

## 2022-10-10 NOTE — PROGRESS NOTES
[facility]    Ms. Angélica Landers is 77 y.o. female with no significant past medical history. She denies any prior history of MI or CHF. Patient was diagnosed with having some kidney stones in 04/2022. She also was diagnosed with vertigo episode in 07/2022. She is feeling back to normal.  She denies any chest pain or chest tightness concerning for angina. She denies any symptoms concerning for heart failure. No lower extremity swelling. No presyncope or syncope. Past Medical History:   Diagnosis Date    Breast mass     right,  excisional biopsy 1/15,  atypical ductal hyperplasia (PATH 1/15)    Colon polyps     Hemorrhoids     Nephrolithiasis 5/24/2022       Review of Systems:  Cardiac symptoms as noted above in HPI. All others negative. Denies fatigue, malaise, skin rash, joint pain, blurring vision, photophobia, neck pain, hemoptysis, chronic cough, nausea, vomiting, hematuria, burning micturition, BRBPR, chronic headaches. Current Outpatient Medications   Medication Sig    cholecalciferol (VITAMIN D3) 1,000 unit cap Take 1,000 Units by mouth every other day. No current facility-administered medications for this visit.        Past Surgical History:   Procedure Laterality Date    COLONOSCOPY N/A 5/19/2016    COLONOSCOPY with polypectomy performed by Shelly Strauss MD at [facility] ENDOSCOPY    COLONOSCOPY N/A 2/13/2018    COLONOSCOPY / polypectomy performed by Mauricio Carrillo MD at [facility] ENDOSCOPY    HX HYSTERECTOMY      [place] BREAST SURGERY PROCEDURE UNLISTED      biopsy, uncertain of which side       Allergies and Sensitivities:  No Known Allergies    Family History:  Family History   Problem Relation Age of Onset    Hypertension Mother     Prostate Cancer Father        Social History:  Social History     Tobacco Use    Smoking status: Former     Years: 13.00     Types: Cigarettes    Smokeless tobacco: Never    Tobacco comments: occasional, stopped at age 37   Vaping Use    Vaping Use: Never used   Substance Use Topics    Alcohol use: No    Drug use: No     She  reports that she has quit smoking. Her smoking use included cigarettes. She has never used smokeless tobacco.  She  reports no history of alcohol use. Physical Exam:  BP Readings from Last 3 Encounters:   10/10/22 128/78   08/15/22 127/84   05/24/22 139/83         Pulse Readings from Last 3 Encounters:   10/10/22 72   08/15/22 63   05/24/22 66          Wt Readings from Last 3 Encounters:   10/10/22 94.8 kg (209 lb)   09/02/22 92.5 kg (204 lb)   08/15/22 92.9 kg (204 lb 12.8 oz)       Constitutional: Oriented to person, place, and time. HENT: Head: Normocephalic and atraumatic. Neck: No JVD present  Cardiovascular: Regular rhythm. No murmur, gallop or rubs appreciated  Lung: Breath sounds normal. No respiratory distress. No ronchi or rales appreciated  Abdominal: No tenderness. No rebound and no guarding. Musculoskeletal: There is no lower extremity edema.  No cynosis      LABS:   @  Lab Results   Component Value Date/Time    WBC 6.4 04/27/2022 01:34 PM    HGB 14.1 04/27/2022 01:34 PM    HCT 43.9 04/27/2022 01:34 PM    PLATELET 298 88/77/6582 01:34 PM    MCV 83.8 04/27/2022 01:34 PM     Lab Results   Component Value Date/Time    Sodium 140 04/27/2022 01:34 PM    Potassium 5.1 04/27/2022 01:34 PM    Chloride 107 04/27/2022 01:34 PM    CO2 29 04/27/2022 01:34 PM    Glucose 95 04/27/2022 01:34 PM    BUN 19 (H) 04/27/2022 01:34 PM    Creatinine 1.14 04/27/2022 01:34 PM     Lipids Latest Ref Rng & Units 4/29/2019   Chol, Total <200 MG/   HDL 40 - 60 MG/DL 81(H)   LDL 0 - 100 MG/DL 68.2   Trig <150 MG/DL 69   Chol/HDL Ratio 0 - 5.0   2.0   Some recent data might be hidden     Lab Results   Component Value Date/Time    ALT (SGPT) 24 04/27/2022 01:34 PM     No results found for: HBA1C, LNQ0AIKN, COU6XDXW, KXS3KWLG  Lab Results   Component Value Date/Time    TSH 0.87 09/23/2016 12:25 PM     EK2021: Sinus bradycardia at 60 bpm.  No ST-T changes of ischemia. Late R wave transition. ECHO ADULT COMPLETE 10/29/2021 10/29/2021  Interpretation Summary  · LV: Estimated LVEF is 55 - 60%. Visually measured ejection fraction. Normal cavity size and systolic function (ejection fraction normal). Mildly increased wall thickness. Wall motion: normal. Age-appropriate left ventricular diastolic function. · LA: Mildly dilated left atrium. Left Atrium volume index is 39 mL/m2. NUCLEAR CARDIAC STRESS TEST 10/29/2021 10/29/2021  Interpretation Summary  · Baseline ECG: Normal sinus rhythm. · SPECT: Left ventricular function post-stress was normal. Calculated ejection fraction is 65% (normal EF value is equal to or greater than 50%). Left ventricular wall motion was normal at stress, no regional wall motion abnormality noted. The TID ratio is 1. 11.  · Stress test: Stress EKG normal.  · SPECT: Left ventricular perfusion is normal.  · SPECT: Left ventricular perfusion is normal. Myocardial perfusion imaging supports a low risk stress test.    IMPRESSION & PLAN:  Ms. Gina Angel is a 77 y.o. female     Chest pain /nausea/diaphoresis:  Denies any episode of chest pain, dyspnea, nausea or diaphoresis since last time. Echocardiogram and nuclear stress test in 10/2021, low risk as mentioned above. Patient denies any symptom that is concerning for angina or heart failure. She has no evidence of fluid overload on exam.  Continue with risk factor modification. All the cardiac symptoms were discussed with the patient in detail    This plan was discussed with patient who is in agreement. Thank you for allowing me to participate in patient care. Please feel free to call me if you have any question or concern. Annabell Kussmaul, MD  Please note: This document has been produced using voice recognition software. Unrecognized errors in transcription may be present.

## 2022-10-18 ENCOUNTER — OFFICE VISIT (OUTPATIENT)
Dept: FAMILY MEDICINE CLINIC | Age: 77
End: 2022-10-18
Payer: MEDICARE

## 2022-10-18 VITALS
SYSTOLIC BLOOD PRESSURE: 138 MMHG | OXYGEN SATURATION: 97 % | WEIGHT: 207.4 LBS | RESPIRATION RATE: 16 BRPM | DIASTOLIC BLOOD PRESSURE: 85 MMHG | BODY MASS INDEX: 35.6 KG/M2 | HEART RATE: 72 BPM

## 2022-10-18 DIAGNOSIS — R06.2 WHEEZING: ICD-10-CM

## 2022-10-18 DIAGNOSIS — R03.0 ELEVATED BP WITHOUT DIAGNOSIS OF HYPERTENSION: ICD-10-CM

## 2022-10-18 DIAGNOSIS — R05.1 ACUTE COUGH: Primary | ICD-10-CM

## 2022-10-18 PROCEDURE — 1123F ACP DISCUSS/DSCN MKR DOCD: CPT | Performed by: FAMILY MEDICINE

## 2022-10-18 PROCEDURE — G8427 DOCREV CUR MEDS BY ELIG CLIN: HCPCS | Performed by: FAMILY MEDICINE

## 2022-10-18 PROCEDURE — G8754 DIAS BP LESS 90: HCPCS | Performed by: FAMILY MEDICINE

## 2022-10-18 PROCEDURE — G8399 PT W/DXA RESULTS DOCUMENT: HCPCS | Performed by: FAMILY MEDICINE

## 2022-10-18 PROCEDURE — 3017F COLORECTAL CA SCREEN DOC REV: CPT | Performed by: FAMILY MEDICINE

## 2022-10-18 PROCEDURE — G8417 CALC BMI ABV UP PARAM F/U: HCPCS | Performed by: FAMILY MEDICINE

## 2022-10-18 PROCEDURE — G8536 NO DOC ELDER MAL SCRN: HCPCS | Performed by: FAMILY MEDICINE

## 2022-10-18 PROCEDURE — 1101F PT FALLS ASSESS-DOCD LE1/YR: CPT | Performed by: FAMILY MEDICINE

## 2022-10-18 PROCEDURE — G8752 SYS BP LESS 140: HCPCS | Performed by: FAMILY MEDICINE

## 2022-10-18 PROCEDURE — 1090F PRES/ABSN URINE INCON ASSESS: CPT | Performed by: FAMILY MEDICINE

## 2022-10-18 PROCEDURE — G8432 DEP SCR NOT DOC, RNG: HCPCS | Performed by: FAMILY MEDICINE

## 2022-10-18 PROCEDURE — 99214 OFFICE O/P EST MOD 30 MIN: CPT | Performed by: FAMILY MEDICINE

## 2022-10-18 NOTE — PROGRESS NOTES
Natasha Whitehead presents today for   Chief Complaint   Patient presents with    Wheezing      Patient states that she has been wheezing since last Friday  states that she went in to have kidney stones removed and stent place and  when she woke up she had a slight cough and has been wheezing since . Patient does states that her bp was elevated during her procedure . Vitals:    10/18/22 1221   BP: (!) 143/93   Pulse: 72   Resp: 16   SpO2: 97%   Weight: 207 lb 6.4 oz (94.1 kg)        Is someone accompanying this pt? No     Is the patient using any DME equipment during OV? No     Depression Screening:  3 most recent PHQ Screens 10/10/2022   Little interest or pleasure in doing things Not at all   Feeling down, depressed, irritable, or hopeless Not at all   Total Score PHQ 2 0       Learning Assessment:  Learning Assessment 10/10/2022   PRIMARY LEARNER Patient   HIGHEST LEVEL OF EDUCATION - PRIMARY LEARNER  -   BARRIERS PRIMARY LEARNER -   CO-LEARNER CAREGIVER -   PRIMARY LANGUAGE ENGLISH   LEARNER PREFERENCE PRIMARY DEMONSTRATION   ANSWERED BY patient   RELATIONSHIP SELF       Abuse Screening:  Abuse Screening Questionnaire 10/10/2022   Do you ever feel afraid of your partner? N   Are you in a relationship with someone who physically or mentally threatens you? N   Is it safe for you to go home? Y       Fall Screening  Fall Risk Assessment, last 12 mths 10/10/2022   Able to walk? Yes   Fall in past 12 months? 0   Do you feel unsteady? 0   Are you worried about falling 0       Generalized Anxiety  No flowsheet data found. Health Maintenance Due   Topic Date Due    Shingrix Vaccine Age 49> (1 of 2) Never done    COVID-19 Vaccine (3 - Booster for [employer] series) 09/28/2021    Flu Vaccine (1) 08/01/2022   . Health Maintenance reviewed and discussed and ordered per Provider. Vaccines Due   Screenings Due       Natasha Whitehead is updated on all HM    1.  \"Have you been to the ER, urgent care clinic since your last visit? Hospitalized since your last visit? \" No    2. \"Have you seen or consulted any other health care providers outside of the [address] [place] [address] since your last visit? \" Yes When: 10/14/22 Where: Out patient surgery center  Reason for visit: Dr. Liat Ordonez - kidney stone removal and stent placement       3. For patients aged 39-70: Has the patient had a colonoscopy / FIT/ Cologuard? NA - based on age     If the patient is female:    4. For patients aged 41-77: Has the patient had a mammogram within the past 2 years? NA - based on age    11. For patients aged 21-65: Has the patient had a pap smear?  NA - based on age

## 2022-10-18 NOTE — PROGRESS NOTES
Chief Complaint   Patient presents with    Wheezing      Patient states that she has been wheezing since last Friday  states that she went in to have kidney stones removed and stent place and  when she woke up she had a slight cough and has been wheezing since . Patient does states that her bp was elevated during her procedure . Tessy Arias is a 77 y.o. female. HPI  Pt reports that on 10/14/22 she was having a procedure with uro for removal of kidney stones. They were unsuccessful in retrieving the stones but did place a stent on the L side. She will go back on 10/28/22 so they can try again. Pt felt fine, other than her kidney stone, going into the procedure but was noted to have an elevated bp of 153/93 in the pre-op area. She is certain that she was nervous about the procedure. However, during anesthesia her bp was elevated to 215/111. It did go down to 615/00 but pt is not certain if any medication was administered. Afterwards in their pacu, her bp was as high 233/126. Pt was not treated with any meds in the PACU but was released when her bp was at 168/92. Pt was advised to f/up with her pcp. Pt checked her bp at home yesterday. It was 801 systolic, pt unsure what the diastolic number was. Pt did have a sausage with her breakfast this morning. Pt also notes that she began to have coughing when she left the OR last week. She has had associated wheezing as well. It is almost resolved at this point. Review of Systems   Constitutional: Negative. HENT: Negative. Respiratory:  Positive for cough and wheezing. Cardiovascular: Negative. Negative for chest pain. All other systems reviewed and are negative. Objective  Physical Exam  Vitals and nursing note reviewed. Constitutional:       Appearance: Normal appearance. She is not ill-appearing. HENT:      Head: Normocephalic and atraumatic.       Right Ear: External ear normal.      Left Ear: External ear normal.      Nose: Nose normal.      Mouth/Throat:      Mouth: Mucous membranes are moist.   Eyes:      Extraocular Movements: Extraocular movements intact. Conjunctiva/sclera: Conjunctivae normal.   Cardiovascular:      Rate and Rhythm: Normal rate and regular rhythm. Heart sounds: No murmur heard. No friction rub. No gallop. Pulmonary:      Effort: Pulmonary effort is normal.      Breath sounds: Normal breath sounds. No wheezing, rhonchi or rales. Musculoskeletal:         General: Normal range of motion. Cervical back: Normal range of motion. Skin:     General: Skin is warm and dry. Neurological:      Mental Status: She is alert and oriented to person, place, and time. Coordination: Coordination normal.   Psychiatric:         Mood and Affect: Mood normal.         Behavior: Behavior normal.         Thought Content: Thought content normal.         Judgment: Judgment normal.        Assessment & Plan  Diagnoses and all orders for this visit:    1. Acute cough  2. Wheezing  Etiology unclear. Resolving    3. Elevated BP without diagnosis of hypertension  Recommend routine home blood pressure monitoring. Follow-up and Dispositions    Return if symptoms worsen or fail to improve.        Courtney Eddy MD

## 2023-03-07 ENCOUNTER — OFFICE VISIT (OUTPATIENT)
Age: 78
End: 2023-03-07
Payer: MEDICARE

## 2023-03-07 VITALS
BODY MASS INDEX: 35.36 KG/M2 | DIASTOLIC BLOOD PRESSURE: 91 MMHG | TEMPERATURE: 98.3 F | SYSTOLIC BLOOD PRESSURE: 159 MMHG | WEIGHT: 206 LBS | HEART RATE: 63 BPM | OXYGEN SATURATION: 99 %

## 2023-03-07 DIAGNOSIS — R03.0 ELEVATED BLOOD-PRESSURE READING, WITHOUT DIAGNOSIS OF HYPERTENSION: ICD-10-CM

## 2023-03-07 DIAGNOSIS — H26.9 CATARACT OF LEFT EYE, UNSPECIFIED CATARACT TYPE: ICD-10-CM

## 2023-03-07 DIAGNOSIS — E66.01 SEVERE OBESITY (BMI 35.0-39.9) WITH COMORBIDITY (HCC): ICD-10-CM

## 2023-03-07 DIAGNOSIS — Z01.818 PREOP EXAMINATION: Primary | ICD-10-CM

## 2023-03-07 PROBLEM — M25.512 ACUTE PAIN OF LEFT SHOULDER: Status: RESOLVED | Noted: 2019-04-23 | Resolved: 2023-03-07

## 2023-03-07 PROBLEM — M25.562 ACUTE PAIN OF LEFT KNEE: Status: RESOLVED | Noted: 2019-04-23 | Resolved: 2023-03-07

## 2023-03-07 PROBLEM — E55.9 VITAMIN D DEFICIENCY: Status: RESOLVED | Noted: 2018-01-23 | Resolved: 2023-03-07

## 2023-03-07 PROCEDURE — 1123F ACP DISCUSS/DSCN MKR DOCD: CPT | Performed by: FAMILY MEDICINE

## 2023-03-07 PROCEDURE — 1036F TOBACCO NON-USER: CPT | Performed by: FAMILY MEDICINE

## 2023-03-07 PROCEDURE — G8484 FLU IMMUNIZE NO ADMIN: HCPCS | Performed by: FAMILY MEDICINE

## 2023-03-07 PROCEDURE — 99215 OFFICE O/P EST HI 40 MIN: CPT | Performed by: FAMILY MEDICINE

## 2023-03-07 PROCEDURE — 1090F PRES/ABSN URINE INCON ASSESS: CPT | Performed by: FAMILY MEDICINE

## 2023-03-07 PROCEDURE — G8417 CALC BMI ABV UP PARAM F/U: HCPCS | Performed by: FAMILY MEDICINE

## 2023-03-07 PROCEDURE — G8427 DOCREV CUR MEDS BY ELIG CLIN: HCPCS | Performed by: FAMILY MEDICINE

## 2023-03-07 PROCEDURE — G8400 PT W/DXA NO RESULTS DOC: HCPCS | Performed by: FAMILY MEDICINE

## 2023-03-07 SDOH — ECONOMIC STABILITY: HOUSING INSECURITY
IN THE LAST 12 MONTHS, WAS THERE A TIME WHEN YOU DID NOT HAVE A STEADY PLACE TO SLEEP OR SLEPT IN A SHELTER (INCLUDING NOW)?: NO

## 2023-03-07 SDOH — ECONOMIC STABILITY: FOOD INSECURITY: WITHIN THE PAST 12 MONTHS, YOU WORRIED THAT YOUR FOOD WOULD RUN OUT BEFORE YOU GOT MONEY TO BUY MORE.: NEVER TRUE

## 2023-03-07 SDOH — ECONOMIC STABILITY: INCOME INSECURITY: HOW HARD IS IT FOR YOU TO PAY FOR THE VERY BASICS LIKE FOOD, HOUSING, MEDICAL CARE, AND HEATING?: NOT VERY HARD

## 2023-03-07 SDOH — ECONOMIC STABILITY: FOOD INSECURITY: WITHIN THE PAST 12 MONTHS, THE FOOD YOU BOUGHT JUST DIDN'T LAST AND YOU DIDN'T HAVE MONEY TO GET MORE.: NEVER TRUE

## 2023-03-07 ASSESSMENT — PATIENT HEALTH QUESTIONNAIRE - PHQ9
SUM OF ALL RESPONSES TO PHQ QUESTIONS 1-9: 0
2. FEELING DOWN, DEPRESSED OR HOPELESS: 0
1. LITTLE INTEREST OR PLEASURE IN DOING THINGS: 0
SUM OF ALL RESPONSES TO PHQ QUESTIONS 1-9: 0
SUM OF ALL RESPONSES TO PHQ QUESTIONS 1-9: 0
SUM OF ALL RESPONSES TO PHQ9 QUESTIONS 1 & 2: 0
SUM OF ALL RESPONSES TO PHQ QUESTIONS 1-9: 0

## 2023-03-07 NOTE — PROGRESS NOTES
Pt here for surgical clearance for left eye cataract removal scheduled 3/23/23. Pt denies any complaints today. 1. \"Have you been to the ER, urgent care clinic since your last visit? Hospitalized since your last visit? \" No    2. \"Have you seen or consulted any other health care providers outside of the [address] [place] [address] since your last visit? \" No     3. For patients aged 39-70: Has the patient had a colonoscopy / FIT/ Cologuard? NA - based on age      If the patient is female:    4. For patients aged 41-77: Has the patient had a mammogram within the past 2 years? NA - based on age or sex      11. For patients aged 21-65: Has the patient had a pap smear?  NA - based on age or sex

## 2023-03-07 NOTE — PROGRESS NOTES
Preoperative Evaluation    Date of Exam: 3/7/2023    Jose Luis Merrill is a 69 y.o. female (:1945) who presents for preoperative evaluation. Procedure/Surgery: Judeen Shoulder w IOL  Date of Procedure/Surgery: 3/23/23  Surgeon: Dr. Fan Gunn.  [address]: [facility]  Primary Physician: Jes De Leon MD  Latex Allergy: No    Problem list   Patient Active Problem List   Diagnosis    Dyslipidemia    Abnormal EKG    Osteoarthritis of lumbar spine    Abnormal echocardiogram    Nephrolithiasis   , Medical History:   Past Medical History:   Diagnosis Date    Abnormal EKG     Acute pain of left knee 2019    Acute pain of left shoulder 2019    AGE (acute gastroenteritis) 3/24/2013    Atypical ductal hyperplasia of breast 2015    Breast mass     right,  excisional biopsy 1/15,  atypical ductal hyperplasia ([facility] 1/15)    Breast pain, left 2014    Colon polyps     Dyslipidemia     Hemorrhoids     Nephrolithiasis 2022    Osteoarthritis of lumbar spine     Tubular adenoma of colon 2016    Vitamin D deficiency 2018   , Allergies: No Known Allergies, Medications:   Current Outpatient Medications   Medication Sig    vitamin D 25 MCG (1000 UT) CAPS Take 1,000 Units by mouth every other day     No current facility-administered medications for this visit.   , Surgical History:   Past Surgical History:   Procedure Laterality Date    BREAST SURGERY      biopsy, uncertain of which side    COLONOSCOPY N/A 2018    COLONOSCOPY / polypectomy performed by Kan Wallis MD at [facility] ENDOSCOPY    COLONOSCOPY N/A 2016    COLONOSCOPY with polypectomy performed by John Kamara MD at  [facility] ([address] [place] [address])      UROLOGICAL SURGERY  10/14/2022    CYSTOSCOPY, BILATERAL RETROGRADES, LEFT URETEROSCOPY, left ureteral dilation, [place] Dr. Ирина Tran  2022    CYSTOSCOPY, LEFT RETROGRADE PYELOGRAM, LEFT URETEROSCOPY, LASER LITHOTRIPSY, LEFT 7fr x 26cm STENT EXCHANGE, ; Dr Rivera Asa   , and Social History:   Social History     Socioeconomic History    Marital status:      Spouse name: None    Number of children: None    Years of education: None    Highest education level: None   Tobacco Use    Smoking status: Former     Packs/day: 0.30     Years: 1.00     Pack years: 0.30     Types: Cigarettes     Quit date: 1970     Years since quittin.2    Smokeless tobacco: Never   Vaping Use    Vaping Use: Never used   Substance and Sexual Activity    Alcohol use: No    Drug use: No     Social Determinants of Health     Financial Resource Strain: Low Risk     Difficulty of Paying Living Expenses: Not very hard   Food Insecurity: No Food Insecurity    Worried About Running Out of Food in the Last Year: Never true    Ran Out of Food in the Last Year: Never true   Transportation Needs: Unknown    Lack of Transportation (Non-Medical): No   Housing Stability: Unknown    Unstable Housing in the Last Year: No       Recent use of: No recent use of aspirin (ASA), NSAIDS or steroids    Tetanus up to date: last tetanus booster within 10 years      Anesthesia Complications: Yes: Personal Hx nausea/vomiting, weakness after anesthia for cystoscopy 10/14/22  History of abnormal bleeding: None  History of Blood Transfusions: No  Health Care Directive or Living Will: No    REVIEW OF SYSTEMS:  A comprehensive review of systems was negative except for: Eyes: positive for cataracts     EXAM:   BP (!) 159/91 (Site: Left Upper Arm, Position: Sitting)   Pulse 63   Temp 98.3 °F (36.8 °C) (Temporal)   Wt 206 lb (93.4 kg)   SpO2 99%   BMI 35.36 kg/m²     General:  Alert, cooperative, no distress, appears stated age. Head:  Normocephalic, without obvious abnormality, atraumatic. Eyes:  Conjunctivae/corneas clear. PERRL, EOMs intact. Ears:  Normal TMs and external ear canals both ears. Nose: Nares normal. Septum midline.  Mucosa normal. No drainage or sinus tenderness. Throat: Lips, mucosa, and tongue normal. Teeth and gums normal.   Neck: Supple, symmetrical, trachea midline, no adenopathy, thyroid: no enlargement/tenderness/nodules, no carotid bruit and no JVD. Back:   Symmetric, no curvature. ROM normal. No CVA tenderness. Lungs:   Clear to auscultation bilaterally. Chest wall:  No tenderness or deformity. Heart:  Regular rate and rhythm, S1, S2 normal, no murmur, click, rub or gallop. Abdomen:   Soft, non-tender. Bowel sounds normal. No masses,  No organomegaly. Extremities: Extremities normal, atraumatic, no cyanosis or edema. Pulses: 2+ and symmetric all extremities. Skin: Skin color, texture, turgor normal. No rashes or lesions. Lymph nodes: Cervical and supraclavicular nodes normal.   Neurologic: CNII-XII intact. Normal strength, sensation and reflexes throughout. IMPRESSION:   None  No contraindications to planned surgery     Diagnosis Orders   1. Preop examination        2. Cataract of left eye, unspecified cataract type  Care as per ophtho      3. Severe obesity (BMI 35.0-39. 9) with comorbidity ([address]  Stable, cont pres tx plan. 4. Elevated blood-pressure reading, without diagnosis of hypertension  Stable, cont pres tx plan.                Mechelle Herrera MD   3/7/2023

## 2023-07-20 ENCOUNTER — OFFICE VISIT (OUTPATIENT)
Facility: CLINIC | Age: 78
End: 2023-07-20

## 2023-07-20 VITALS
RESPIRATION RATE: 16 BRPM | BODY MASS INDEX: 35.59 KG/M2 | HEART RATE: 69 BPM | OXYGEN SATURATION: 98 % | DIASTOLIC BLOOD PRESSURE: 76 MMHG | SYSTOLIC BLOOD PRESSURE: 133 MMHG | WEIGHT: 210.6 LBS

## 2023-07-20 DIAGNOSIS — M25.562 ACUTE PAIN OF LEFT KNEE: Primary | ICD-10-CM

## 2023-07-20 NOTE — PROGRESS NOTES
Jessica Ascencio (:  1945) is a 69 y.o. female,Established patient, here for evaluation of the following chief complaint(s):  Knee Pain ( Patient complains of knee pain since last Friday states that it is a painful pressure . She has a history of an old knee injury 30 yrs ago recalls having fluid removed . Getting up from a sitting position makes it worse . She has used voltaren gel and veterinary liniment for the pain which has helped .  )         ASSESSMENT/PLAN:  1. Acute pain of left knee  -     [facility] - Kayden Griffin MD, Orthopedic SurgerySurgery(General/Arthroscopic/Total Joint), [facility] ([address])      Return if symptoms worsen or fail to improve. Subjective   SUBJECTIVE/OBJECTIVE:  HPI  Pt here for eval of L knee pain. She has remote hx of \"torn ligament\" in the late . Pt reports 1 wk hx of pain this time; no trauma prior to recent onset. She has had some trouble with walking as well as with standing. She does not have much pain when she is sitting. She has used voltaren gel and another topical med with small improvement of her pain. She has not taken any oral meds; she has not tried heat or ice. Review of Systems   Constitutional: Negative. HENT: Negative. Respiratory: Negative. Cardiovascular: Negative. Musculoskeletal:  Positive for arthralgias. All other systems reviewed and are negative. Objective   Physical Exam  Vitals and nursing note reviewed. Constitutional:       General: She is not in acute distress. Appearance: Normal appearance. HENT:      Head: Normocephalic and atraumatic. Right Ear: External ear normal.      Left Ear: External ear normal.      Nose: Nose normal.      Mouth/Throat:      Mouth: Mucous membranes are moist.   Eyes:      Extraocular Movements: Extraocular movements intact. Conjunctiva/sclera: Conjunctivae normal.   Cardiovascular:      Rate and Rhythm: Normal rate and regular rhythm.       Heart

## 2023-07-20 NOTE — PROGRESS NOTES
Chief Complaint   Patient presents with    Knee Pain      Patient complains of knee pain since last Friday states that it is a painful pressure . She has a history of an old knee injury 30 yrs ago recalls having fluid removed . Getting up from a sitting position makes it worse . She has used voltaren gel and veterinary liniment for the pain which has help         Vitals:    07/20/23 1448   BP: 133/76   Pulse: 69   Resp: 16   SpO2: 98%        Depression: Not at risk    PHQ-2 Score: 0        No flowsheet data found. Eulogio Leonidas \"Have you been to the ER, urgent care clinic since your last visit? Hospitalized since your last visit? \" No     2. \"Have you seen or consulted any other health care providers outside of the [address] [place] [address] since your last visit? \" No      3. For patients aged 43-73: Has the patient had a colonoscopy / FIT/ Cologuard? N/A     If the patient is female:    4. For patients aged 43-66: Has the patient had a mammogram within the past 2 years? N/A    5. For patients aged 21-65: Has the patient had a pap smear?  N/A

## 2023-07-23 ASSESSMENT — ENCOUNTER SYMPTOMS: RESPIRATORY NEGATIVE: 1

## 2023-08-28 ENCOUNTER — TELEPHONE (OUTPATIENT)
Facility: CLINIC | Age: 78
End: 2023-08-28

## 2023-08-28 NOTE — TELEPHONE ENCOUNTER
Dr Shelli Turner office faxed over paperwork for this pt pre-op with dr bates gave to Vasquez Escamilla her nurse

## 2023-09-14 ENCOUNTER — OFFICE VISIT (OUTPATIENT)
Age: 78
End: 2023-09-14

## 2023-09-14 VITALS — BODY MASS INDEX: 35.34 KG/M2 | HEIGHT: 64 IN | TEMPERATURE: 97.9 F | WEIGHT: 207 LBS

## 2023-09-14 DIAGNOSIS — M25.562 CHRONIC PAIN OF LEFT KNEE: Primary | ICD-10-CM

## 2023-09-14 DIAGNOSIS — M17.11 PRIMARY OSTEOARTHRITIS OF RIGHT KNEE: ICD-10-CM

## 2023-09-14 DIAGNOSIS — M17.12 PRIMARY OSTEOARTHRITIS OF LEFT KNEE: ICD-10-CM

## 2023-09-14 DIAGNOSIS — M25.561 CHRONIC PAIN OF RIGHT KNEE: ICD-10-CM

## 2023-09-14 DIAGNOSIS — G89.29 CHRONIC PAIN OF RIGHT KNEE: ICD-10-CM

## 2023-09-14 DIAGNOSIS — G89.29 CHRONIC PAIN OF LEFT KNEE: Primary | ICD-10-CM

## 2023-09-14 DIAGNOSIS — M85.80 OSTEOPENIA, UNSPECIFIED LOCATION: ICD-10-CM

## 2023-09-21 ENCOUNTER — OFFICE VISIT (OUTPATIENT)
Facility: CLINIC | Age: 78
End: 2023-09-21

## 2023-09-21 VITALS
WEIGHT: 208.4 LBS | RESPIRATION RATE: 16 BRPM | BODY MASS INDEX: 35.77 KG/M2 | DIASTOLIC BLOOD PRESSURE: 90 MMHG | HEART RATE: 64 BPM | SYSTOLIC BLOOD PRESSURE: 142 MMHG | OXYGEN SATURATION: 98 %

## 2023-09-21 DIAGNOSIS — Z87.442 HISTORY OF NEPHROLITHIASIS: ICD-10-CM

## 2023-09-21 DIAGNOSIS — E55.9 VITAMIN D DEFICIENCY: ICD-10-CM

## 2023-09-21 DIAGNOSIS — E78.5 DYSLIPIDEMIA: ICD-10-CM

## 2023-09-21 DIAGNOSIS — H26.9 CATARACT OF RIGHT EYE, UNSPECIFIED CATARACT TYPE: ICD-10-CM

## 2023-09-21 DIAGNOSIS — Z01.818 PREOP EXAMINATION: Primary | ICD-10-CM

## 2023-09-21 NOTE — PROGRESS NOTES
Preoperative Evaluation    Date of Exam: 2023    Fani Sanches is a 69 y.o. female (:1945) who presents for preoperative evaluation.    Procedure/Surgery: Naz Gabrielemer w IOL  Date of Procedure/Surgery: 10/12/23  Surgeon: Dr. Javi Feliz: SEGUNDOP  Primary Physician: Víctor Downs MD  Latex Allergy: No    Problem list   Patient Active Problem List   Diagnosis    Dyslipidemia    Abnormal EKG    Osteoarthritis of lumbar spine    Abnormal echocardiogram    Nephrolithiasis   , Medical History:   Past Medical History:   Diagnosis Date    Abnormal EKG     Acute pain of left knee 2019    Acute pain of left shoulder 2019    AGE (acute gastroenteritis) 2013    Atypical ductal hyperplasia of breast 2015    Breast mass     right,  excisional biopsy 1/15,  atypical ductal hyperplasia ([facility] 1/15)    Breast pain, left 2014    Cataracts, bilateral     Colon polyps     Dyslipidemia     Hemorrhoids     Nephrolithiasis 2022    Osteoarthritis of lumbar spine     Tubular adenoma of colon 2016    Vitamin D deficiency 2018   , Allergies: No Known Allergies, Medications:   Current Outpatient Medications   Medication Sig    vitamin D 25 MCG (1000 UT) CAPS Take 1 capsule by mouth every other day     No current facility-administered medications for this visit.   , Surgical History:   Past Surgical History:   Procedure Laterality Date    BREAST SURGERY      biopsy, uncertain of which side    CATARACT EXTRACTION W/ INTRAOCULAR LENS IMPLANT Left 2023    COLONOSCOPY N/A 2018    COLONOSCOPY / polypectomy performed by Lisbet Stevens MD at [facility] ENDOSCOPY    COLONOSCOPY N/A 2016    COLONOSCOPY with polypectomy performed by Jelly Turk MD at [address] [place] [address] ([address] [place] [address])      UROLOGICAL SURGERY  10/14/2022    CYSTOSCOPY, BILATERAL RETROGRADES, LEFT URETEROSCOPY, left ureteral dilation, [place] [address] Dr. Armaan Montalvo

## 2023-09-29 ENCOUNTER — OFFICE VISIT (OUTPATIENT)
Facility: CLINIC | Age: 78
End: 2023-09-29

## 2023-09-29 VITALS — DIASTOLIC BLOOD PRESSURE: 76 MMHG | SYSTOLIC BLOOD PRESSURE: 116 MMHG

## 2023-09-29 DIAGNOSIS — R03.0 ELEVATED BLOOD-PRESSURE READING, WITHOUT DIAGNOSIS OF HYPERTENSION: Primary | ICD-10-CM

## 2023-09-29 NOTE — PROGRESS NOTES
Adam Reddy (:  1945) is a 69 y.o. female,Established patient, here for evaluation of the following chief complaint(s):  Hypertension         ASSESSMENT/PLAN:  1. Elevated blood-pressure reading, without diagnosis of hypertension      No follow-ups on file. Subjective   SUBJECTIVE/OBJECTIVE:   Pt returns for bp recheck last office visit BP was elevated to 145/84 to 142/90   Todays pts bp as follows 146/87 to 116/76  pt will follow up as scheduled           An electronic signature was used to authenticate this note.     --Bj Frank LPN

## 2023-10-16 ENCOUNTER — OFFICE VISIT (OUTPATIENT)
Age: 78
End: 2023-10-16
Payer: MEDICARE

## 2023-10-16 VITALS
OXYGEN SATURATION: 97 % | BODY MASS INDEX: 35.87 KG/M2 | WEIGHT: 209 LBS | HEART RATE: 59 BPM | SYSTOLIC BLOOD PRESSURE: 130 MMHG | DIASTOLIC BLOOD PRESSURE: 90 MMHG

## 2023-10-16 DIAGNOSIS — R07.9 CHEST PAIN, UNSPECIFIED TYPE: Primary | ICD-10-CM

## 2023-10-16 PROCEDURE — G8399 PT W/DXA RESULTS DOCUMENT: HCPCS | Performed by: INTERNAL MEDICINE

## 2023-10-16 PROCEDURE — 1123F ACP DISCUSS/DSCN MKR DOCD: CPT | Performed by: INTERNAL MEDICINE

## 2023-10-16 PROCEDURE — 1090F PRES/ABSN URINE INCON ASSESS: CPT | Performed by: INTERNAL MEDICINE

## 2023-10-16 PROCEDURE — G8484 FLU IMMUNIZE NO ADMIN: HCPCS | Performed by: INTERNAL MEDICINE

## 2023-10-16 PROCEDURE — 1036F TOBACCO NON-USER: CPT | Performed by: INTERNAL MEDICINE

## 2023-10-16 PROCEDURE — 93000 ELECTROCARDIOGRAM COMPLETE: CPT | Performed by: INTERNAL MEDICINE

## 2023-10-16 PROCEDURE — 99213 OFFICE O/P EST LOW 20 MIN: CPT | Performed by: INTERNAL MEDICINE

## 2023-10-16 PROCEDURE — G8428 CUR MEDS NOT DOCUMENT: HCPCS | Performed by: INTERNAL MEDICINE

## 2023-10-16 PROCEDURE — G8417 CALC BMI ABV UP PARAM F/U: HCPCS | Performed by: INTERNAL MEDICINE

## 2023-10-16 RX ORDER — KETOROLAC TROMETHAMINE 5 MG/ML
SOLUTION OPHTHALMIC
COMMUNITY
Start: 2023-10-09

## 2023-10-16 RX ORDER — MOXIFLOXACIN 5 MG/ML
SOLUTION/ DROPS OPHTHALMIC
COMMUNITY
Start: 2023-10-09

## 2023-10-16 RX ORDER — PREDNISOLONE ACETATE 10 MG/ML
SUSPENSION/ DROPS OPHTHALMIC
COMMUNITY
Start: 2023-10-12

## 2024-01-24 ENCOUNTER — OFFICE VISIT (OUTPATIENT)
Facility: CLINIC | Age: 79
End: 2024-01-24
Payer: MEDICARE

## 2024-01-24 VITALS
OXYGEN SATURATION: 98 % | SYSTOLIC BLOOD PRESSURE: 164 MMHG | DIASTOLIC BLOOD PRESSURE: 86 MMHG | HEART RATE: 65 BPM | RESPIRATION RATE: 16 BRPM

## 2024-01-24 DIAGNOSIS — M25.551 RIGHT HIP PAIN: Primary | ICD-10-CM

## 2024-01-24 DIAGNOSIS — R03.0 ELEVATED BP WITHOUT DIAGNOSIS OF HYPERTENSION: ICD-10-CM

## 2024-01-24 PROCEDURE — 99214 OFFICE O/P EST MOD 30 MIN: CPT | Performed by: FAMILY MEDICINE

## 2024-01-24 PROCEDURE — 1090F PRES/ABSN URINE INCON ASSESS: CPT | Performed by: FAMILY MEDICINE

## 2024-01-24 PROCEDURE — G8399 PT W/DXA RESULTS DOCUMENT: HCPCS | Performed by: FAMILY MEDICINE

## 2024-01-24 PROCEDURE — G8417 CALC BMI ABV UP PARAM F/U: HCPCS | Performed by: FAMILY MEDICINE

## 2024-01-24 PROCEDURE — G8484 FLU IMMUNIZE NO ADMIN: HCPCS | Performed by: FAMILY MEDICINE

## 2024-01-24 PROCEDURE — 1036F TOBACCO NON-USER: CPT | Performed by: FAMILY MEDICINE

## 2024-01-24 PROCEDURE — 1123F ACP DISCUSS/DSCN MKR DOCD: CPT | Performed by: FAMILY MEDICINE

## 2024-01-24 PROCEDURE — G8427 DOCREV CUR MEDS BY ELIG CLIN: HCPCS | Performed by: FAMILY MEDICINE

## 2024-01-24 NOTE — PROGRESS NOTES
Chief Complaint   Patient presents with    Leg Pain        Vitals:    01/24/24 1226   BP: (!) 164/86   Pulse: 65   Resp: 16   SpO2: 98%        Depression: Not at risk (3/7/2023)    PHQ-2     PHQ-2 Score: 0              \"Have you been to the ER, urgent care clinic since your last visit?  Hospitalized since your last visit?\"    NO    “Have you seen or consulted any other health care providers outside of [facility] since your last visit?”    NO                
General: Normal range of motion.      Cervical back: Normal range of motion.   Skin:     General: Skin is warm and dry.   Neurological:      Mental Status: She is alert and oriented to person, place, and time.      Coordination: Coordination normal.   Psychiatric:         Mood and Affect: Mood normal.         Behavior: Behavior normal.         Thought Content: Thought content normal.         Judgment: Judgment normal.                  An electronic signature was used to authenticate this note.    --Elo Calix MD

## 2024-01-25 ENCOUNTER — OFFICE VISIT (OUTPATIENT)
Age: 79
End: 2024-01-25
Payer: MEDICARE

## 2024-01-25 VITALS — BODY MASS INDEX: 35.68 KG/M2 | HEIGHT: 64 IN | WEIGHT: 209 LBS | TEMPERATURE: 98 F

## 2024-01-25 DIAGNOSIS — M16.11 UNILATERAL PRIMARY OSTEOARTHRITIS, RIGHT HIP: ICD-10-CM

## 2024-01-25 DIAGNOSIS — M25.551 RIGHT HIP PAIN: Primary | ICD-10-CM

## 2024-01-25 PROCEDURE — 1036F TOBACCO NON-USER: CPT | Performed by: SPECIALIST

## 2024-01-25 PROCEDURE — 1090F PRES/ABSN URINE INCON ASSESS: CPT | Performed by: SPECIALIST

## 2024-01-25 PROCEDURE — G8417 CALC BMI ABV UP PARAM F/U: HCPCS | Performed by: SPECIALIST

## 2024-01-25 PROCEDURE — 99213 OFFICE O/P EST LOW 20 MIN: CPT | Performed by: SPECIALIST

## 2024-01-25 PROCEDURE — 1123F ACP DISCUSS/DSCN MKR DOCD: CPT | Performed by: SPECIALIST

## 2024-01-25 PROCEDURE — G8484 FLU IMMUNIZE NO ADMIN: HCPCS | Performed by: SPECIALIST

## 2024-01-25 PROCEDURE — G8399 PT W/DXA RESULTS DOCUMENT: HCPCS | Performed by: SPECIALIST

## 2024-01-25 PROCEDURE — 73502 X-RAY EXAM HIP UNI 2-3 VIEWS: CPT | Performed by: SPECIALIST

## 2024-01-25 PROCEDURE — G8427 DOCREV CUR MEDS BY ELIG CLIN: HCPCS | Performed by: SPECIALIST

## 2024-01-25 RX ORDER — HYALURONATE SODIUM 10 MG/ML
20 SYRINGE (ML) INTRAARTICULAR ONCE
Status: CANCELLED | OUTPATIENT
Start: 2024-01-25 | End: 2024-01-25

## 2024-01-25 NOTE — PROGRESS NOTES
Patient: Aylin Lainez                MRN: [id number]       SSN: [id number]  YOB: 1945        AGE: 78 y.o.        SEX: female      PCP: Elo Calix MD  24    Chief Complaint   Patient presents with    Hip Pain     right     HISTORY:  Aylin Lainez is a 78 y.o. female who is seen for 2 week history of right hip pain. She denies any new injury. She feels pain in her right groin and lateral hip with standing and walking. Her hip stiffens up after she sits for long periods of time. She experiences a pinching sensation when she is walking. Her leg feels like it is going to give out on her.     She was previously seen for bilateral knee pain.  She does not recall any prior knee injury.      Occupation, etc: Mrs. 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  She is currently retired.  She lives in [place], [place] with her .  She has 2 adult sons, 6 grandchildren and 2 great-grandchildren.   Wt Readings from Last 3 Encounters:   24 94.8 kg (209 lb)   10/31/23 94.8 kg (209 lb)   10/16/23 94.8 kg (209 lb)      Body mass index is 35.87 kg/m².    Patient Active Problem List   Diagnosis    Dyslipidemia    Abnormal EKG    Osteoarthritis of lumbar spine    Abnormal echocardiogram    Nephrolithiasis       Social History     Tobacco Use    Smoking status: Former     Current packs/day: 0.00     Average packs/day: 0.3 packs/day for 1 year (0.3 ttl pk-yrs)     Types: Cigarettes     Start date: 1969     Quit date: 1970     Years since quittin.1    Smokeless tobacco: Never   Substance Use Topics    Drug use: No        No Known Allergies     Current Outpatient Medications   Medication Sig    ketorolac (ACULAR) 0.5 % ophthalmic solution instill 1 drop twice a day EVERYDAY INTO RIGHT EYE STARTING 3 DAY...  (REFER TO PRESCRIPTION NOTES). (Patient not taking: Reported on 2024)    prednisoLONE acetate (PRED FORTE) 1 % ophthalmic

## 2024-01-29 ENCOUNTER — TELEMEDICINE (OUTPATIENT)
Facility: CLINIC | Age: 79
End: 2024-01-29
Payer: MEDICARE

## 2024-01-29 DIAGNOSIS — Z00.00 MEDICARE ANNUAL WELLNESS VISIT, SUBSEQUENT: Primary | ICD-10-CM

## 2024-01-29 DIAGNOSIS — E66.01 SEVERE OBESITY (BMI 35.0-39.9) WITH COMORBIDITY (HCC): ICD-10-CM

## 2024-01-29 DIAGNOSIS — Z71.89 ACP (ADVANCE CARE PLANNING): ICD-10-CM

## 2024-01-29 PROBLEM — R03.0 ELEVATED BP WITHOUT DIAGNOSIS OF HYPERTENSION: Status: ACTIVE | Noted: 2024-01-29

## 2024-01-29 PROBLEM — M25.551 RIGHT HIP PAIN: Status: ACTIVE | Noted: 2024-01-29

## 2024-01-29 PROCEDURE — G0439 PPPS, SUBSEQ VISIT: HCPCS | Performed by: FAMILY MEDICINE

## 2024-01-29 PROCEDURE — 99497 ADVNCD CARE PLAN 30 MIN: CPT | Performed by: FAMILY MEDICINE

## 2024-01-29 PROCEDURE — 1123F ACP DISCUSS/DSCN MKR DOCD: CPT | Performed by: FAMILY MEDICINE

## 2024-01-29 ASSESSMENT — PATIENT HEALTH QUESTIONNAIRE - PHQ9
SUM OF ALL RESPONSES TO PHQ9 QUESTIONS 1 & 2: 0
SUM OF ALL RESPONSES TO PHQ QUESTIONS 1-9: 0
2. FEELING DOWN, DEPRESSED OR HOPELESS: 0
1. LITTLE INTEREST OR PLEASURE IN DOING THINGS: 0
SUM OF ALL RESPONSES TO PHQ QUESTIONS 1-9: 0

## 2024-01-29 ASSESSMENT — LIFESTYLE VARIABLES
HOW OFTEN DO YOU HAVE A DRINK CONTAINING ALCOHOL: NEVER
HOW MANY STANDARD DRINKS CONTAINING ALCOHOL DO YOU HAVE ON A TYPICAL DAY: PATIENT DOES NOT DRINK

## 2024-01-29 NOTE — PROGRESS NOTES
Medicare Annual Wellness Visit    Aylin Lainez is here for Medicare AWV    Assessment & Plan   Medicare annual wellness visit, subsequent  ACP (advance care planning)  Severe obesity (BMI 35.0-39.9) with comorbidity (HCC)    Recommendations for Preventive Services Due: see orders and patient instructions/AVS.  Recommended screening schedule for the next 5-10 years is provided to the patient in written form: see Patient Instructions/AVS.     No follow-ups on file.     Subjective       Patient's complete Health Risk Assessment and screening values have been reviewed and are found in Flowsheets. The following problems were reviewed today and where indicated follow up appointments were made and/or referrals ordered.    Positive Risk Factor Screenings with Interventions:                Activity, Diet, and Weight:  On average, how many days per week do you engage in moderate to strenuous exercise (like a brisk walk)?: 0 days  On average, how many minutes do you engage in exercise at this level?: 0 min    Do you eat balanced/healthy meals regularly?: Yes    There is no height or weight on file to calculate BMI. (!) Abnormal      Inactivity Interventions:  Patient comments: pt feels she can start doing more.  She has been referred to PT by ortho.   Obesity Interventions:  Pt will work on eating smaller portions.              Dentist Screen:  Have you seen the dentist within the past year?: (!) No    Intervention:  Advised to schedule with their dentist      Safety:  Do you always fasten your seatbelt when you are in a car?: (!) No  Interventions:  Recommend wearing a seat belt every time.  Pt reports that she DOES wear a seatbelt every time.                       Objective      Patient-Reported Vitals  No data recorded          No Known Allergies  Prior to Visit Medications    Medication Sig Taking? Authorizing Provider   vitamin D 25 MCG (1000 UT) CAPS Take 1 capsule by mouth every other day Yes Automatic Reconciliation, Ar

## 2024-01-29 NOTE — PATIENT INSTRUCTIONS
Care list are included within your After Visit Summary for your review.    Other Preventive Recommendations:    A preventive eye exam performed by an eye specialist is recommended every 1-2 years to screen for glaucoma; cataracts, macular degeneration, and other eye disorders.  A preventive dental visit is recommended every 6 months.  Try to get at least 150 minutes of exercise per week or 10,000 steps per day on a pedometer .  Order or download the FREE \"Exercise & Physical Activity: Your Everyday Guide\" from [employer]. Call 1-258.559.3723 or search [employer] online.  You need 0914-0090 mg of calcium and 8404-4849 IU of vitamin D per day. It is possible to meet your calcium requirement with diet alone, but a vitamin D supplement is usually necessary to meet this goal.  When exposed to the sun, use a sunscreen that protects against both UVA and UVB radiation with an SPF of 30 or greater. Reapply every 2 to 3 hours or after sweating, drying off with a towel, or swimming.  Always wear a seat belt when traveling in a car. Always wear a helmet when riding a bicycle or motorcycle.

## 2024-01-29 NOTE — PROGRESS NOTES
Advance Care Planning     Advance Care Planning (ACP) Physician/NP/PA Conversation    Date of Conversation: 1/29/2024  Conducted with: Patient with Decision Making Capacity    Healthcare Decision Maker:    Primary Decision Maker: Roland Lainez - Spouse - [phone]    Secondary Decision Maker: Rick Sorensen - Child - [phone]  Click here to complete Healthcare Decision Makers including selection of the Healthcare Decision Maker Relationship (ie \"Primary\").         Care Preferences:    Hospitalization:  \"If your health worsens and it becomes clear that your chance of recovery is unlikely, what would be your preference regarding hospitalization?\"  The patient is unsure.    Ventilation:  \"If you were unable to breath on your own and your chance of recovery was unlikely, what would be your preference about the use of a ventilator (breathing machine) if it was available to you?\"  The patient would desire the use of a ventilator.    Resuscitation:  \"In the event your heart stopped as a result of an underlying serious health condition, would you want attempts made to restart your heart, or would you prefer a natural death?\"  Yes, attempt to resuscitate.      Conversation Outcomes / Follow-Up Plan:  ACP in process - information provided, considering goals and options  Reviewed DNR/DNI and patient elects Full Code (Attempt Resuscitation)    Length of Voluntary ACP Conversation in minutes:  16 minutes    Elo Calix MD

## 2024-01-30 ENCOUNTER — TELEPHONE (OUTPATIENT)
Facility: CLINIC | Age: 79
End: 2024-01-30

## 2024-02-05 ENCOUNTER — OFFICE VISIT (OUTPATIENT)
Facility: CLINIC | Age: 79
End: 2024-02-05

## 2024-02-05 VITALS
HEIGHT: 64 IN | DIASTOLIC BLOOD PRESSURE: 87 MMHG | BODY MASS INDEX: 35.68 KG/M2 | SYSTOLIC BLOOD PRESSURE: 134 MMHG | RESPIRATION RATE: 15 BRPM | WEIGHT: 209 LBS

## 2024-02-05 DIAGNOSIS — R03.0 ELEVATED BP WITHOUT DIAGNOSIS OF HYPERTENSION: Primary | ICD-10-CM

## 2024-02-05 NOTE — PROGRESS NOTES
Aylin Lainez is being seen today for a Blood Pressure Recheck.     Aylin Lainez was seen 1/29/2024 and her Blood Pressure was:   BP Readings from Last 1 Encounters:   02/05/24 134/87           Priti Virgen MA

## 2024-07-19 ENCOUNTER — HOSPITAL ENCOUNTER (EMERGENCY)
Facility: HOSPITAL | Age: 79
Discharge: HOME | End: 2024-07-19
Attending: EMERGENCY MEDICINE
Payer: MEDICARE

## 2024-07-19 ENCOUNTER — APPOINTMENT (OUTPATIENT)
Facility: HOSPITAL | Age: 79
End: 2024-07-19
Attending: EMERGENCY MEDICINE
Payer: MEDICARE

## 2024-07-19 VITALS
DIASTOLIC BLOOD PRESSURE: 67 MMHG | TEMPERATURE: 97.5 F | OXYGEN SATURATION: 98 % | BODY MASS INDEX: 35.51 KG/M2 | SYSTOLIC BLOOD PRESSURE: 134 MMHG | HEIGHT: 64 IN | HEART RATE: 82 BPM | WEIGHT: 208 LBS | RESPIRATION RATE: 17 BRPM

## 2024-07-19 DIAGNOSIS — U07.1 COVID-19: Primary | ICD-10-CM

## 2024-07-19 DIAGNOSIS — R11.0 NAUSEA: ICD-10-CM

## 2024-07-19 LAB
ANION GAP SERPL CALC-SCNC: 9 MMOL/L (ref 5–15)
APPEARANCE UR: CLEAR
BACTERIA URNS QL MICRO: ABNORMAL /HPF
BASOPHILS # BLD: 0 K/UL (ref 0–0.1)
BASOPHILS NFR BLD: 0 % (ref 0–1)
BILIRUB UR QL CFM: NEGATIVE
BUN SERPL-MCNC: 12 MG/DL (ref 6–20)
BUN/CREAT SERPL: 10 (ref 12–20)
CA-I BLD-MCNC: 8.7 MG/DL (ref 8.5–10.1)
CHLORIDE SERPL-SCNC: 101 MMOL/L (ref 97–108)
CO2 SERPL-SCNC: 27 MMOL/L (ref 21–32)
COLOR UR: ABNORMAL
CREAT SERPL-MCNC: 1.15 MG/DL (ref 0.55–1.02)
DIFFERENTIAL METHOD BLD: ABNORMAL
EKG ATRIAL RATE: 86 BPM
EKG DIAGNOSIS: NORMAL
EKG P AXIS: 20 DEGREES
EKG P-R INTERVAL: 161 MS
EKG Q-T INTERVAL: 339 MS
EKG QRS DURATION: 87 MS
EKG QTC CALCULATION (BAZETT): 399 MS
EKG R AXIS: -38 DEGREES
EKG T AXIS: 106 DEGREES
EKG VENTRICULAR RATE: 83 BPM
EOSINOPHIL # BLD: 0 K/UL (ref 0–0.4)
EOSINOPHIL NFR BLD: 0 % (ref 0–7)
ERYTHROCYTE [DISTWIDTH] IN BLOOD BY AUTOMATED COUNT: 15.2 % (ref 11.5–14.5)
FLUAV RNA SPEC QL NAA+PROBE: NOT DETECTED
FLUBV RNA SPEC QL NAA+PROBE: NOT DETECTED
GLUCOSE SERPL-MCNC: 135 MG/DL (ref 65–100)
GLUCOSE UR STRIP.AUTO-MCNC: NEGATIVE MG/DL
HCT VFR BLD AUTO: 43.2 % (ref 35–47)
HGB BLD-MCNC: 13.8 G/DL (ref 11.5–16)
HGB UR QL STRIP: NEGATIVE
IMM GRANULOCYTES # BLD AUTO: 0 K/UL (ref 0–0.04)
IMM GRANULOCYTES NFR BLD AUTO: 0 % (ref 0–0.5)
KETONES UR QL STRIP.AUTO: 15 MG/DL
LEUKOCYTE ESTERASE UR QL STRIP.AUTO: ABNORMAL
LYMPHOCYTES # BLD: 0.8 K/UL (ref 0.8–3.5)
LYMPHOCYTES NFR BLD: 14 % (ref 12–49)
MCH RBC QN AUTO: 26.5 PG (ref 26–34)
MCHC RBC AUTO-ENTMCNC: 31.9 G/DL (ref 30–36.5)
MCV RBC AUTO: 83.1 FL (ref 80–99)
MONOCYTES # BLD: 0.6 K/UL (ref 0–1)
MONOCYTES NFR BLD: 10 % (ref 5–13)
NEUTS SEG # BLD: 4.5 K/UL (ref 1.8–8)
NEUTS SEG NFR BLD: 76 % (ref 32–75)
NITRITE UR QL STRIP.AUTO: NEGATIVE
NRBC # BLD: 0 K/UL (ref 0–0.01)
NRBC BLD-RTO: 0 PER 100 WBC
PH UR STRIP: 6 (ref 5–8)
PLATELET # BLD AUTO: 166 K/UL (ref 150–400)
PMV BLD AUTO: 10.5 FL (ref 8.9–12.9)
POTASSIUM SERPL-SCNC: 3.6 MMOL/L (ref 3.5–5.1)
PROT UR STRIP-MCNC: 30 MG/DL
RBC # BLD AUTO: 5.2 M/UL (ref 3.8–5.2)
RBC #/AREA URNS HPF: ABNORMAL /HPF (ref 0–3)
RBC MORPH BLD: ABNORMAL
SARS-COV-2 RNA RESP QL NAA+PROBE: DETECTED
SODIUM SERPL-SCNC: 137 MMOL/L (ref 136–145)
SP GR UR REFRACTOMETRY: 1.02 (ref 1–1.03)
TROPONIN I SERPL HS-MCNC: 25 NG/L (ref 0–51)
URINE CULTURE IF INDICATED: ABNORMAL
UROBILINOGEN UR QL STRIP.AUTO: 0.2 EU/DL (ref 0.2–1)
WBC # BLD AUTO: 5.9 K/UL (ref 3.6–11)
WBC URNS QL MICRO: ABNORMAL /HPF (ref 0–5)

## 2024-07-19 PROCEDURE — 84484 ASSAY OF TROPONIN QUANT: CPT

## 2024-07-19 PROCEDURE — 80048 BASIC METABOLIC PNL TOTAL CA: CPT

## 2024-07-19 PROCEDURE — 71045 X-RAY EXAM CHEST 1 VIEW: CPT

## 2024-07-19 PROCEDURE — 81001 URINALYSIS AUTO W/SCOPE: CPT

## 2024-07-19 PROCEDURE — 85025 COMPLETE CBC W/AUTO DIFF WBC: CPT

## 2024-07-19 PROCEDURE — 36415 COLL VENOUS BLD VENIPUNCTURE: CPT

## 2024-07-19 PROCEDURE — 99285 EMERGENCY DEPT VISIT HI MDM: CPT

## 2024-07-19 PROCEDURE — 87636 SARSCOV2 & INF A&B AMP PRB: CPT

## 2024-07-19 PROCEDURE — 6370000000 HC RX 637 (ALT 250 FOR IP): Performed by: EMERGENCY MEDICINE

## 2024-07-19 PROCEDURE — 70450 CT HEAD/BRAIN W/O DYE: CPT

## 2024-07-19 PROCEDURE — 94760 N-INVAS EAR/PLS OXIMETRY 1: CPT

## 2024-07-19 RX ORDER — ONDANSETRON 4 MG/1
4 TABLET, ORALLY DISINTEGRATING ORAL ONCE
Status: COMPLETED | OUTPATIENT
Start: 2024-07-19 | End: 2024-07-19

## 2024-07-19 RX ORDER — ACETAMINOPHEN 500 MG
1000 TABLET ORAL
Status: COMPLETED | OUTPATIENT
Start: 2024-07-19 | End: 2024-07-19

## 2024-07-19 RX ORDER — ONDANSETRON 4 MG/1
4 TABLET, ORALLY DISINTEGRATING ORAL EVERY 8 HOURS PRN
Qty: 11 TABLET | Refills: 0 | Status: SHIPPED | OUTPATIENT
Start: 2024-07-19

## 2024-07-19 RX ADMIN — ACETAMINOPHEN 1000 MG: 500 TABLET ORAL at 18:31

## 2024-07-19 RX ADMIN — ONDANSETRON 4 MG: 4 TABLET, ORALLY DISINTEGRATING ORAL at 18:31

## 2024-07-19 ASSESSMENT — PAIN - FUNCTIONAL ASSESSMENT: PAIN_FUNCTIONAL_ASSESSMENT: 0-10

## 2024-07-19 ASSESSMENT — PAIN SCALES - GENERAL: PAINLEVEL_OUTOF10: 0

## 2024-07-19 NOTE — ED TRIAGE NOTES
Pt arrives for general complaint of feeling unwell with nausea. Pt was found by EMS laying on floor. Pt did not fall, just laid down to not feeling well. Pt denies chest pain. States she began having nausea at around 1730.

## 2024-07-19 NOTE — DISCHARGE INSTRUCTIONS
You were seen in the ER for your weakness and nausea. This is likely caused by your COVID infection that we found. We did not find any other dangerous causes like bleeding on the brain or a dangerous electrolyte abnormality.     Thankfully, you are not dehydrated and we were able to control your symptoms with an antinausea medication. This medication dissolves under your tongue so you can take it even if you are actively vomiting.     We are giving you an antiviral that will treat COVID for the next 5 days but you may have a rebound of symptoms after you stop this medication.    Take tylenol or ibuprofen for aches, pains or fever for the next few days and follow up with your primary care doctor to make sure you are getting better. Return to the ER for any new pain, uncontrollable vomiting or diarrhea or any other new or concerning symptoms.        Thank you for choosing our Emergency Department for your care.  It is our privilege to care for you in your time of need.  In the next several days, you may receive a survey via email or mailed to your home about your experience with our team.  We would greatly appreciate you taking a few minutes to complete the survey, as we use this information to learn what we have done well and what we could be doing better. Thank you for trusting us with your care!    Below you will find a list of your tests from today's visit.   Labs  Recent Results (from the past 12 hour(s))   EKG 12 Lead    Collection Time: 07/19/24  6:06 PM   Result Value Ref Range    Ventricular Rate 83 BPM    Atrial Rate 86 BPM    P-R Interval 161 ms    QRS Duration 87 ms    Q-T Interval 339 ms    QTc Calculation (Bazett) 399 ms    P Axis 20 degrees    R Axis -38 degrees    T Axis 106 degrees    Diagnosis       Sinus rhythm  Supraventricular bigeminy  Abnormal R-wave progression, late transition  LVH with secondary repolarization abnormality  Probable inferior infarct, recent  Baseline wander in lead(s)

## 2024-07-19 NOTE — ED PROVIDER NOTES
Oral Given 7/19/24 1831)   ondansetron (ZOFRAN-ODT) disintegrating tablet 4 mg (4 mg SubLINGual Given 7/19/24 1831)       CONSULTS: See ED Course/MDM for further details.         PROCEDURES   Unless otherwise noted above, none      CRITICAL CARE TIME   N/a     ED IMPRESSION     1. COVID-19    2. Nausea          DISPOSITION/PLAN   Discharge     PATIENT REFERRED TO:  Elo Calix MD  [address] [place] [id number] [phone]    In 1 week          DISCHARGE MEDICATIONS:     Medication List        START taking these medications      nirmatrelvir/ritonavir 300/100 20 x 150 MG & 10 x 100MG Tbpk  Commonly known as: Paxlovid (300/100)  Take 3 tablets (two 150 mg nirmatrelvir and one 100 mg ritonavir tablets) by mouth every 12 hours for 5 days.     ondansetron 4 MG disintegrating tablet  Commonly known as: ZOFRAN-ODT  Place 1 tablet under the tongue every 8 hours as needed for Nausea or Vomiting            ASK your doctor about these medications      ketorolac 0.5 % ophthalmic solution  Commonly known as: ACULAR     prednisoLONE acetate 1 % ophthalmic suspension  Commonly known as: PRED FORTE     vitamin D 25 MCG (1000 UT) Caps               Where to Get Your Medications        These medications were sent to [id number] - [place], [place] - [address] [place] [address] - P [phone] - F [phone]  [address] [place] [id number]      Phone: [phone]   nirmatrelvir/ritonavir 300/100 20 x 150 MG & 10 x 100MG Tbpk  ondansetron 4 MG disintegrating tablet           DISCONTINUED MEDICATIONS:  Current Discharge Medication List          I am the Primary Clinician of Record. Louie Coburn MD (electronically signed)    (Please note that parts of this dictation were completed with voice recognition software. Quite often unanticipated grammatical, syntax, homophones, and other interpretive errors are inadvertently transcribed by the computer software. Please disregards these errors. Please excuse any

## 2024-07-22 LAB
EKG ATRIAL RATE: 86 BPM
EKG DIAGNOSIS: NORMAL
EKG P AXIS: 20 DEGREES
EKG P-R INTERVAL: 161 MS
EKG Q-T INTERVAL: 339 MS
EKG QRS DURATION: 87 MS
EKG QTC CALCULATION (BAZETT): 399 MS
EKG R AXIS: -38 DEGREES
EKG T AXIS: 106 DEGREES
EKG VENTRICULAR RATE: 83 BPM

## 2024-10-13 ENCOUNTER — HOSPITAL ENCOUNTER (EMERGENCY)
Facility: HOSPITAL | Age: 79
Discharge: HOME | End: 2024-10-13
Attending: EMERGENCY MEDICINE
Payer: MEDICARE

## 2024-10-13 VITALS
DIASTOLIC BLOOD PRESSURE: 128 MMHG | SYSTOLIC BLOOD PRESSURE: 144 MMHG | RESPIRATION RATE: 16 BRPM | OXYGEN SATURATION: 98 % | HEART RATE: 52 BPM | TEMPERATURE: 98.5 F

## 2024-10-13 DIAGNOSIS — T63.421A FIRE ANT BITE, ACCIDENTAL OR UNINTENTIONAL, INITIAL ENCOUNTER: Primary | ICD-10-CM

## 2024-10-13 PROCEDURE — 6370000000 HC RX 637 (ALT 250 FOR IP): Performed by: EMERGENCY MEDICINE

## 2024-10-13 PROCEDURE — 99283 EMERGENCY DEPT VISIT LOW MDM: CPT

## 2024-10-13 RX ORDER — ACETAMINOPHEN 500 MG
1000 TABLET ORAL
Status: COMPLETED | OUTPATIENT
Start: 2024-10-13 | End: 2024-10-13

## 2024-10-13 RX ORDER — BENZOCAINE/MENTHOL 6 MG-10 MG
LOZENGE MUCOUS MEMBRANE ONCE
Status: COMPLETED | OUTPATIENT
Start: 2024-10-13 | End: 2024-10-13

## 2024-10-13 RX ORDER — BENZOCAINE/MENTHOL 6 MG-10 MG
LOZENGE MUCOUS MEMBRANE
Qty: 30 G | Refills: 1 | Status: SHIPPED | OUTPATIENT
Start: 2024-10-13 | End: 2024-10-20

## 2024-10-13 RX ORDER — CETIRIZINE HYDROCHLORIDE 5 MG/1
5 TABLET ORAL ONCE
Status: COMPLETED | OUTPATIENT
Start: 2024-10-13 | End: 2024-10-13

## 2024-10-13 RX ADMIN — CETIRIZINE HYDROCHLORIDE 5 MG: 5 TABLET ORAL at 18:55

## 2024-10-13 RX ADMIN — HYDROCORTISONE: 1 CREAM TOPICAL at 18:56

## 2024-10-13 RX ADMIN — ACETAMINOPHEN 1000 MG: 500 TABLET ORAL at 18:55

## 2024-10-13 ASSESSMENT — PAIN SCALES - GENERAL: PAINLEVEL_OUTOF10: 10

## 2024-10-13 ASSESSMENT — PAIN - FUNCTIONAL ASSESSMENT: PAIN_FUNCTIONAL_ASSESSMENT: ACTIVITIES ARE NOT PREVENTED

## 2024-10-13 NOTE — ED PROVIDER NOTES
appropriate mood        SCREENINGS                   LAB, EKG AND DIAGNOSTIC RESULTS   Labs:  No results found for this or any previous visit (from the past 12 hour(s)).      EKG interpretation by me:       Radiologic Studies:  Non-plain film images such as CT, Ultrasound and MRI are read by the radiologist. Plain radiographic images are visualized and preliminarily interpreted by the ED Provider with the following findings: (SEE ED COURSE)    Interpretation per the Radiologist below, if available at the time of this note:  No orders to display                    ED COURSE and DIFFERENTIAL DIAGNOSIS/MDM         6:29 PM Differential and Considerations: 78-year-old female with insect bite.  No evidence of anaphylactic reaction.  Patient placed toothpaste on bites, will wash off and apply hydrocortisone ointment, give Zyrtec p.o.  Will give Tylenol for pain.  Will discharge with return precautions.        Records Reviewed (source and summary of external notes): Prior medical records and Nursing notes.    Vitals:    Vitals:    10/13/24 1821   BP: (!) 144/128   Pulse: 52   Resp: 16   Temp: 98.5 °F (36.9 °C)   SpO2: 98%        ED COURSE         Patient was given the following medications:  Medications   cetirizine (ZYRTEC) tablet 5 mg (has no administration in time range)   hydrocortisone 1 % cream (has no administration in time range)   acetaminophen (TYLENOL) tablet 1,000 mg (has no administration in time range)       CONSULTS: See ED Course/MDM for further details.         PROCEDURES   Unless otherwise noted above, none      CRITICAL CARE TIME        ED IMPRESSION     1. Fire ant bite, accidental or unintentional, initial encounter          DISPOSITION/PLAN   Discharge     PATIENT REFERRED TO:  Elo Calix MD  [address] [facility] [id number] [phone]    In 1 week          DISCHARGE MEDICATIONS:     Medication List        START taking these medications      hydrocortisone 1 % cream  Commonly known as:

## 2024-10-13 NOTE — DISCHARGE INSTRUCTIONS
You were seen in the emergency room for your fire ant bites.  Thankfully you do not have any signs of a dangerous allergic reaction.  You can use the steroid cream that we have prescribed you for any pain or itching.  We also gave you a single dose of antihistamine to take today that is nondrowsy.    You can take tylenol or ibuprofen for aches and pains for the next few days. If necessary, you can alternate these every 3 hours so that you always have something on board. For instance, you can take tylenol at 9am, ibuprofen at noon, tylenol again at 3pm and ibuprofen again at 6pm. Take in plenty of water to stay hydrated and follow up with your primary care doctor in the next few days.     Return to the emergency room for any worsening pain, difficulty breathing, or any other new or concerning symptoms.        Thank you for choosing our Emergency Department for your care.  It is our privilege to care for you in your time of need.  In the next several days, you may receive a survey via email or mailed to your home about your experience with our team.  We would greatly appreciate you taking a few minutes to complete the survey, as we use this information to learn what we have done well and what we could be doing better. Thank you for trusting us with your care!    Below you will find a list of your tests from today's visit.   Labs  No results found for this or any previous visit (from the past 12 hour(s)).    Radiologic Studies  No orders to display     ------------------------------------------------------------------------------------------------------------  The evaluation and treatment you received in the Emergency Department were for an urgent problem. It is important that you follow-up with a doctor, nurse practitioner, or physician assistant to:  (1) confirm your diagnosis,  (2) re-evaluation of changes in your illness and treatment, and (3) for ongoing care. Please take your discharge instructions with you when

## 2024-10-13 NOTE — ED TRIAGE NOTES
Pt reports fire ant bites to bilateral feet and hands at 4pm. PT reports rash to bilateral arms, denies swelling to face or mouth denies SOB.

## 2024-10-31 ENCOUNTER — OFFICE VISIT (OUTPATIENT)
Facility: CLINIC | Age: 79
End: 2024-10-31

## 2024-10-31 VITALS
HEIGHT: 64 IN | WEIGHT: 205 LBS | HEART RATE: 63 BPM | TEMPERATURE: 97.9 F | RESPIRATION RATE: 13 BRPM | DIASTOLIC BLOOD PRESSURE: 77 MMHG | OXYGEN SATURATION: 98 % | BODY MASS INDEX: 35 KG/M2 | SYSTOLIC BLOOD PRESSURE: 124 MMHG

## 2024-10-31 DIAGNOSIS — Z13.6 ENCOUNTER FOR LIPID SCREENING FOR CARDIOVASCULAR DISEASE: ICD-10-CM

## 2024-10-31 DIAGNOSIS — M62.838 NECK MUSCLE SPASM: Primary | ICD-10-CM

## 2024-10-31 DIAGNOSIS — Z23 NEEDS FLU SHOT: ICD-10-CM

## 2024-10-31 DIAGNOSIS — Z13.220 ENCOUNTER FOR LIPID SCREENING FOR CARDIOVASCULAR DISEASE: ICD-10-CM

## 2024-10-31 RX ORDER — TIZANIDINE 2 MG/1
2 TABLET ORAL NIGHTLY PRN
Qty: 20 TABLET | Refills: 0 | Status: SHIPPED | OUTPATIENT
Start: 2024-10-31

## 2024-10-31 SDOH — ECONOMIC STABILITY: FOOD INSECURITY: WITHIN THE PAST 12 MONTHS, YOU WORRIED THAT YOUR FOOD WOULD RUN OUT BEFORE YOU GOT MONEY TO BUY MORE.: NEVER TRUE

## 2024-10-31 SDOH — ECONOMIC STABILITY: FOOD INSECURITY: WITHIN THE PAST 12 MONTHS, THE FOOD YOU BOUGHT JUST DIDN'T LAST AND YOU DIDN'T HAVE MONEY TO GET MORE.: NEVER TRUE

## 2024-10-31 SDOH — ECONOMIC STABILITY: INCOME INSECURITY: HOW HARD IS IT FOR YOU TO PAY FOR THE VERY BASICS LIKE FOOD, HOUSING, MEDICAL CARE, AND HEATING?: NOT HARD AT ALL

## 2024-10-31 ASSESSMENT — PATIENT HEALTH QUESTIONNAIRE - PHQ9
SUM OF ALL RESPONSES TO PHQ QUESTIONS 1-9: 0
1. LITTLE INTEREST OR PLEASURE IN DOING THINGS: NOT AT ALL
SUM OF ALL RESPONSES TO PHQ9 QUESTIONS 1 & 2: 0
2. FEELING DOWN, DEPRESSED OR HOPELESS: NOT AT ALL
SUM OF ALL RESPONSES TO PHQ QUESTIONS 1-9: 0

## 2024-10-31 ASSESSMENT — SOCIAL DETERMINANTS OF HEALTH (SDOH)
WITHIN THE LAST YEAR, HAVE TO BEEN RAPED OR FORCED TO HAVE ANY KIND OF SEXUAL ACTIVITY BY YOUR PARTNER OR EX-PARTNER?: NO
WITHIN THE LAST YEAR, HAVE YOU BEEN HUMILIATED OR EMOTIONALLY ABUSED IN OTHER WAYS BY YOUR PARTNER OR EX-PARTNER?: NO
WITHIN THE LAST YEAR, HAVE YOU BEEN KICKED, HIT, SLAPPED, OR OTHERWISE PHYSICALLY HURT BY YOUR PARTNER OR EX-PARTNER?: NO
WITHIN THE LAST YEAR, HAVE YOU BEEN AFRAID OF YOUR PARTNER OR EX-PARTNER?: NO

## 2024-10-31 ASSESSMENT — ENCOUNTER SYMPTOMS: RESPIRATORY NEGATIVE: 1

## 2024-10-31 NOTE — PROGRESS NOTES
Obtained signed  Immunization Consent Form. Patient received  Injection of Flu High Dose administered in Left Deltoid Site. Verified by Priti Virgen CMA  that this is the correct immunization/injection. Patient observed for 15 minutes with no adverse reaction.

## 2024-10-31 NOTE — PROGRESS NOTES
Aylin Lainez (:  1945) is a 79 y.o. female,Established patient, here for evaluation of the following chief complaint(s):  Neck Pain (Patient states that she is having discomfort from her shoulder up to her neck on the left side. Patient describe pain as a cramp and painful. Pain rate today 4 out of 10. She states that pain worsens at night. Tylenol is the only medication that patient is taking for the pain with little relief. )         Assessment & Plan  Neck muscle spasm   tylenol prn.  Ok to use analgesic balm such as tiger balm or icy hot.  Ok to try heat/massage prn.     Orders:    tiZANidine (ZANAFLEX) 2 MG tablet; Take 1 tablet by mouth nightly as needed (muscle spasms)    Encounter for lipid screening for cardiovascular disease       Orders:    Lipid Panel; Future    Comprehensive Metabolic Panel; Future    Needs flu shot       Orders:    Influenza, FLUAD Trivalent, (age 65 y+), IM, Preservative Free, 0.5mL      Return if symptoms worsen or fail to improve.       Subjective   HPI  Pt c/o L neck, shoulder, and head pain  that started 2 days ago.  She took tylenol with some relief. She did not have any chest or jaw pain. Initially, she felt like she had a cramp in her neck.  When she got in the bed, it got worse.  Pt denies injury or trauma to the affected area.  She had some stiffness.  She has some decreased ROM secondary to the pain.  It is not as stiff now as it had been previously.        Review of Systems   Constitutional: Negative.    HENT: Negative.     Respiratory: Negative.     Cardiovascular: Negative.    Musculoskeletal:  Positive for neck pain.   All other systems reviewed and are negative.         Objective   Physical Exam  Vitals and nursing note reviewed.   Constitutional:       General: She is not in acute distress.     Appearance: Normal appearance.   HENT:      Head: Normocephalic and atraumatic.      Right Ear: External ear normal.      Left Ear: External ear normal.      Nose:

## 2024-10-31 NOTE — PROGRESS NOTES
Aylin Lainez presents today for   Chief Complaint   Patient presents with    Neck Pain     Patient states that she is having discomfort from her shoulder up to her neck on the left side. Patient describe pain as a cramp and painful. Pain rate today 4 out of 10. She states that pain worsens at night. Tylenol is the only medication that patient is taking for the pain with little relief.        Is someone accompanying this pt? no    Is the patient using any DME equipment during OV? no    Depression Screening:      10/31/2024    12:27 PM 1/29/2024    10:49 AM 3/7/2023    10:03 AM 10/10/2022     9:59 AM 5/24/2022     1:22 PM 1/10/2022    10:10 AM 9/20/2021    10:02 AM   PHQ-9 Questionaire   Little interest or pleasure in doing things 0 0 0 0 0 0 0   Feeling down, depressed, or hopeless 0 0 0 0 0 0 0   PHQ-9 Total Score 0 0 0 0 0 0 0        TIFFANY 7-Anxiety        No data to display                 Travel Screening:    Travel Screening       Question Response    Have you been in contact with someone who was sick? No / Unsure    Do you have any of the following new or worsening symptoms? None of these    Have you traveled internationally or domestically in the last month? No          Travel History   Travel since 09/30/24    No documented travel since 09/30/24          Health Maintenance reviewed and discussed and ordered per Provider.  Transportation Needs: Unknown (10/31/2024)    PRAPARE - Transportation     Lack of Transportation (Medical): Not on file     Lack of Transportation (Non-Medical): No      Food Insecurity: No Food Insecurity (10/31/2024)    Hunger Vital Sign     Worried About Running Out of Food in the Last Year: Never true     Ran Out of Food in the Last Year: Never true     Financial Resource Strain: Low Risk  (10/31/2024)    Overall Financial Resource Strain (CARDIA)     Difficulty of Paying Living Expenses: Not hard at all     Housing Stability: Unknown (10/31/2024)    Housing Stability Vital Sign

## 2024-11-01 ENCOUNTER — TELEPHONE (OUTPATIENT)
Facility: CLINIC | Age: 79
End: 2024-11-01

## 2024-11-01 ENCOUNTER — HOSPITAL ENCOUNTER (EMERGENCY)
Facility: HOSPITAL | Age: 79
Discharge: HOME | End: 2024-11-01
Attending: EMERGENCY MEDICINE
Payer: MEDICARE

## 2024-11-01 ENCOUNTER — HOSPITAL ENCOUNTER (OUTPATIENT)
Facility: HOSPITAL | Age: 79
Discharge: HOME | End: 2024-11-04
Payer: MEDICARE

## 2024-11-01 ENCOUNTER — APPOINTMENT (OUTPATIENT)
Facility: HOSPITAL | Age: 79
End: 2024-11-01
Payer: MEDICARE

## 2024-11-01 VITALS
HEIGHT: 65 IN | OXYGEN SATURATION: 95 % | TEMPERATURE: 99 F | HEART RATE: 77 BPM | DIASTOLIC BLOOD PRESSURE: 81 MMHG | BODY MASS INDEX: 34.16 KG/M2 | WEIGHT: 205 LBS | SYSTOLIC BLOOD PRESSURE: 134 MMHG | RESPIRATION RATE: 16 BRPM

## 2024-11-01 DIAGNOSIS — G44.209 TENSION HEADACHE: Primary | ICD-10-CM

## 2024-11-01 DIAGNOSIS — Z13.220 ENCOUNTER FOR LIPID SCREENING FOR CARDIOVASCULAR DISEASE: ICD-10-CM

## 2024-11-01 DIAGNOSIS — S16.1XXA ACUTE STRAIN OF NECK MUSCLE, INITIAL ENCOUNTER: ICD-10-CM

## 2024-11-01 DIAGNOSIS — Z13.6 ENCOUNTER FOR LIPID SCREENING FOR CARDIOVASCULAR DISEASE: ICD-10-CM

## 2024-11-01 LAB
ALBUMIN SERPL-MCNC: 3.5 G/DL (ref 3.4–5)
ALBUMIN/GLOB SERPL: 1.1 (ref 0.8–1.7)
ALP SERPL-CCNC: 93 U/L (ref 45–117)
ALT SERPL-CCNC: 13 U/L (ref 13–56)
ANION GAP SERPL CALC-SCNC: 2 MMOL/L (ref 3–18)
AST SERPL-CCNC: 16 U/L (ref 10–38)
BILIRUB SERPL-MCNC: 0.8 MG/DL (ref 0.2–1)
BUN SERPL-MCNC: 15 MG/DL (ref 7–18)
BUN/CREAT SERPL: 20 (ref 12–20)
CALCIUM SERPL-MCNC: 9.1 MG/DL (ref 8.5–10.1)
CHLORIDE SERPL-SCNC: 108 MMOL/L (ref 100–111)
CHOLEST SERPL-MCNC: 203 MG/DL
CO2 SERPL-SCNC: 30 MMOL/L (ref 21–32)
CREAT SERPL-MCNC: 0.75 MG/DL (ref 0.6–1.3)
GLOBULIN SER CALC-MCNC: 3.2 G/DL (ref 2–4)
GLUCOSE SERPL-MCNC: 96 MG/DL (ref 74–99)
HDLC SERPL-MCNC: 96 MG/DL (ref 40–60)
HDLC SERPL: 2.1 (ref 0–5)
LDLC SERPL CALC-MCNC: 96 MG/DL (ref 0–100)
LIPID PANEL: ABNORMAL
POTASSIUM SERPL-SCNC: 4.1 MMOL/L (ref 3.5–5.5)
PROT SERPL-MCNC: 6.7 G/DL (ref 6.4–8.2)
SODIUM SERPL-SCNC: 140 MMOL/L (ref 136–145)
TRIGL SERPL-MCNC: 55 MG/DL
VLDLC SERPL CALC-MCNC: 11 MG/DL

## 2024-11-01 PROCEDURE — 80053 COMPREHEN METABOLIC PANEL: CPT

## 2024-11-01 PROCEDURE — 96372 THER/PROPH/DIAG INJ SC/IM: CPT

## 2024-11-01 PROCEDURE — 99284 EMERGENCY DEPT VISIT MOD MDM: CPT

## 2024-11-01 PROCEDURE — 80061 LIPID PANEL: CPT

## 2024-11-01 PROCEDURE — 36415 COLL VENOUS BLD VENIPUNCTURE: CPT

## 2024-11-01 PROCEDURE — 6370000000 HC RX 637 (ALT 250 FOR IP)

## 2024-11-01 PROCEDURE — 70450 CT HEAD/BRAIN W/O DYE: CPT

## 2024-11-01 PROCEDURE — 6360000002 HC RX W HCPCS

## 2024-11-01 RX ORDER — ACETAMINOPHEN 500 MG
1000 TABLET ORAL
Status: COMPLETED | OUTPATIENT
Start: 2024-11-01 | End: 2024-11-01

## 2024-11-01 RX ORDER — PROCHLORPERAZINE EDISYLATE 5 MG/ML
10 INJECTION INTRAMUSCULAR; INTRAVENOUS
Status: COMPLETED | OUTPATIENT
Start: 2024-11-01 | End: 2024-11-01

## 2024-11-01 RX ORDER — LIDOCAINE 4 G/G
1 PATCH TOPICAL
Status: DISCONTINUED | OUTPATIENT
Start: 2024-11-01 | End: 2024-11-01 | Stop reason: HOSPADM

## 2024-11-01 RX ORDER — PROCHLORPERAZINE EDISYLATE 5 MG/ML
10 INJECTION INTRAMUSCULAR; INTRAVENOUS
Status: DISCONTINUED | OUTPATIENT
Start: 2024-11-01 | End: 2024-11-01

## 2024-11-01 RX ADMIN — ACETAMINOPHEN 1000 MG: 500 TABLET ORAL at 17:27

## 2024-11-01 RX ADMIN — PROCHLORPERAZINE EDISYLATE 10 MG: 5 INJECTION INTRAMUSCULAR; INTRAVENOUS at 17:27

## 2024-11-01 ASSESSMENT — PAIN - FUNCTIONAL ASSESSMENT
PAIN_FUNCTIONAL_ASSESSMENT: 0-10
PAIN_FUNCTIONAL_ASSESSMENT: 0-10

## 2024-11-01 ASSESSMENT — LIFESTYLE VARIABLES
HOW MANY STANDARD DRINKS CONTAINING ALCOHOL DO YOU HAVE ON A TYPICAL DAY: PATIENT DOES NOT DRINK
HOW OFTEN DO YOU HAVE A DRINK CONTAINING ALCOHOL: NEVER

## 2024-11-01 ASSESSMENT — PAIN SCALES - GENERAL
PAINLEVEL_OUTOF10: 2
PAINLEVEL_OUTOF10: 7

## 2024-11-01 ASSESSMENT — PAIN DESCRIPTION - LOCATION: LOCATION: HEAD;NECK

## 2024-11-01 NOTE — TELEPHONE ENCOUNTER
Patient states when she lays down at night her head hurts. She is wanting to know if she could have imaging done on her head. Please advise.

## 2024-11-01 NOTE — ED TRIAGE NOTES
Pt to ED for eval of headache/neck pain x 4 days. Pt seen by PCP yesterday and given muscle relaxers with no relief. Pt reports pain increases when lying down. Denies balance issues, dizziness. Pt with no facial droop/slurred speech, BRYSON x 4 and ambulates with steady gait.

## 2024-11-01 NOTE — TELEPHONE ENCOUNTER
Spoke with patient. She states that she is having severe headaches. Patient was just seen yesterday with Dr. Calix, which she was given muscle relaxer but it is not helping with her head. Patient would like to know if provider can order a CT Scan. She states that her head is hurting worse at night and she is taking Tylenol.     Please Advise

## 2024-11-01 NOTE — ED PROVIDER NOTES
(acute gastroenteritis) 2013    Atypical ductal hyperplasia of breast 2015    Breast mass     right,  excisional biopsy 1/15,  atypical ductal hyperplasia (PATH 1/15)    Breast pain, left 2014    Cataracts, bilateral     Colon polyps     Dyslipidemia     Hemorrhoids     Nephrolithiasis 2022    Osteoarthritis of lumbar spine     Tubular adenoma of colon 2016    Vitamin D deficiency 2018       Past Surgical History:  Past Surgical History:   Procedure Laterality Date    BREAST SURGERY      biopsy, uncertain of which side    CATARACT EXTRACTION  10/2023    CATARACT EXTRACTION W/ INTRAOCULAR LENS IMPLANT Left 2023    COLONOSCOPY N/A 2018    COLONOSCOPY / polypectomy performed by Leonardo Dawson MD at [facility] ENDOSCOPY    COLONOSCOPY N/A 2016    COLONOSCOPY with polypectomy performed by Timothy Brower MD at [facility] ENDOSCOPY    HYSTERECTOMY (CERVIX STATUS UNKNOWN)      UROLOGICAL SURGERY  10/14/2022    CYSTOSCOPY, BILATERAL RETROGRADES, LEFT URETEROSCOPY, left ureteral dilation, LEFT STENT PLACEMENT Dr. Mcgill    UROLOGICAL SURGERY  2022    CYSTOSCOPY, LEFT RETROGRADE PYELOGRAM, LEFT URETEROSCOPY, LASER LITHOTRIPSY, LEFT 7fr x 26cm STENT EXCHANGE, ; Dr Mcgill       Family History:  Family History   Problem Relation Age of Onset    Prostate Cancer Father     Hypertension Mother        Social History:   Social History     Tobacco Use    Smoking status: Former     Current packs/day: 0.00     Average packs/day: 0.3 packs/day for 1 year (0.3 ttl pk-yrs)     Types: Cigarettes     Start date: 1969     Quit date: 1970     Years since quittin.8    Smokeless tobacco: Never   Substance Use Topics    Drug use: No       Allergies:  No Known Allergies      Physical Exam     Vitals:    24 1656 24 1715   BP: 137/83 133/85   Pulse: 77    Resp: 16    Temp: 99 °F (37.2 °C)    TempSrc: Oral    SpO2: 98% 96%   Weight: 93 kg (205 lb)    Height: 1.651 m (5' 5\")

## 2024-11-04 ENCOUNTER — OFFICE VISIT (OUTPATIENT)
Age: 79
End: 2024-11-04
Payer: MEDICARE

## 2024-11-04 VITALS
HEART RATE: 66 BPM | OXYGEN SATURATION: 97 % | BODY MASS INDEX: 35.34 KG/M2 | DIASTOLIC BLOOD PRESSURE: 72 MMHG | SYSTOLIC BLOOD PRESSURE: 112 MMHG | HEIGHT: 64 IN | WEIGHT: 207 LBS

## 2024-11-04 DIAGNOSIS — R94.31 ABNORMAL EKG: Primary | ICD-10-CM

## 2024-11-04 PROCEDURE — 1090F PRES/ABSN URINE INCON ASSESS: CPT | Performed by: INTERNAL MEDICINE

## 2024-11-04 PROCEDURE — 99213 OFFICE O/P EST LOW 20 MIN: CPT | Performed by: INTERNAL MEDICINE

## 2024-11-04 PROCEDURE — G8482 FLU IMMUNIZE ORDER/ADMIN: HCPCS | Performed by: INTERNAL MEDICINE

## 2024-11-04 PROCEDURE — G8399 PT W/DXA RESULTS DOCUMENT: HCPCS | Performed by: INTERNAL MEDICINE

## 2024-11-04 PROCEDURE — 1126F AMNT PAIN NOTED NONE PRSNT: CPT | Performed by: INTERNAL MEDICINE

## 2024-11-04 PROCEDURE — G8428 CUR MEDS NOT DOCUMENT: HCPCS | Performed by: INTERNAL MEDICINE

## 2024-11-04 PROCEDURE — 1036F TOBACCO NON-USER: CPT | Performed by: INTERNAL MEDICINE

## 2024-11-04 PROCEDURE — 1124F ACP DISCUSS-NO DSCNMKR DOCD: CPT | Performed by: INTERNAL MEDICINE

## 2024-11-04 PROCEDURE — G8417 CALC BMI ABV UP PARAM F/U: HCPCS | Performed by: INTERNAL MEDICINE

## 2024-11-04 NOTE — PROGRESS NOTES
[facility]    Aylin Lainez is 79 y.o. female     Patient is here today for follow-up appointment.  Denies any hospital mission or ER visit since last time.  Denies any resting or exertional chest pain or chest pressure to suggest angina or any dyspnea to suggest heart failure.   No presyncope or syncope  Denies any PND or LE edema.     Past Medical History:   Diagnosis Date    Abnormal EKG     Acute pain of left knee 04/23/2019    Acute pain of left shoulder 04/23/2019    AGE (acute gastroenteritis) 03/24/2013    Atypical ductal hyperplasia of breast 01/27/2015    Breast mass     right,  excisional biopsy 1/15,  atypical ductal hyperplasia ([facility] 1/15)    Breast pain, left 11/04/2014    Cataracts, bilateral     Colon polyps     Dyslipidemia     Hemorrhoids     Nephrolithiasis 05/24/2022    Osteoarthritis of lumbar spine     Tubular adenoma of colon 07/14/2016    Vitamin D deficiency 01/23/2018       Review of Systems:  Cardiac symptoms as noted above in HPI. All others negative.  Denies fatigue, malaise, skin rash, joint pain, blurring vision, photophobia, neck pain, hemoptysis, chronic cough, nausea, vomiting, hematuria, burning micturition, BRBPR, chronic headaches.    Current Outpatient Medications   Medication Sig    tiZANidine (ZANAFLEX) 2 MG tablet Take 1 tablet by mouth nightly as needed (muscle spasms)    ondansetron (ZOFRAN-ODT) 4 MG disintegrating tablet Place 1 tablet under the tongue every 8 hours as needed for Nausea or Vomiting    vitamin D 25 MCG (1000 UT) CAPS Take 1 capsule by mouth every other day     No current facility-administered medications for this visit.       Past Surgical History:   Procedure Laterality Date    BREAST SURGERY      biopsy, uncertain of which side    CATARACT EXTRACTION  10/2023    CATARACT EXTRACTION W/ INTRAOCULAR LENS IMPLANT Left 03/2023    COLONOSCOPY N/A 02/13/2018    COLONOSCOPY / polypectomy performed

## 2025-03-10 ENCOUNTER — HOSPITAL ENCOUNTER (OUTPATIENT)
Facility: HOSPITAL | Age: 80
Discharge: HOME | End: 2025-03-13
Payer: MEDICARE

## 2025-03-10 DIAGNOSIS — D35.02: ICD-10-CM

## 2025-03-10 DIAGNOSIS — D35.00 ADRENAL ADENOMA, UNSPECIFIED LATERALITY: ICD-10-CM

## 2025-03-10 LAB — CREAT UR-MCNC: 0.9 MG/DL (ref 0.6–1.3)

## 2025-03-10 PROCEDURE — 82088 ASSAY OF ALDOSTERONE: CPT

## 2025-03-10 PROCEDURE — 36415 COLL VENOUS BLD VENIPUNCTURE: CPT

## 2025-03-10 PROCEDURE — 74150 CT ABDOMEN W/O CONTRAST: CPT

## 2025-03-10 PROCEDURE — 84244 ASSAY OF RENIN: CPT

## 2025-03-10 PROCEDURE — 82533 TOTAL CORTISOL: CPT

## 2025-03-10 PROCEDURE — 83835 ASSAY OF METANEPHRINES: CPT

## 2025-03-10 PROCEDURE — 82565 ASSAY OF CREATININE: CPT

## 2025-03-11 LAB — CORTIS AM PEAK SERPL-MCNC: 0.8 UG/DL (ref 4.3–22.45)

## 2025-03-12 ENCOUNTER — RESULTS FOLLOW-UP (OUTPATIENT)
Facility: HOSPITAL | Age: 80
End: 2025-03-12

## 2025-03-12 NOTE — RESULT ENCOUNTER NOTE
LEFT adrenal 2.3 cm lesion consistent with lipid rich adenoma and is stable in  size since at least CT 6/30/2022.    Stable. Will review CT and labs at scheduled follow-up visit.

## 2025-03-13 LAB
ALDOST SERPL-MCNC: 13.6 NG/DL (ref 0–30)
ALDOST/RENIN PLAS-RTO: NORMAL {RATIO}
RENIN PLAS-CCNC: NORMAL NG/ML/HR

## 2025-03-14 LAB
METANEPH FREE SERPL-MCNC: <25 PG/ML (ref 0–88)
NORMETANEPHRINE SERPL-MCNC: 43.8 PG/ML (ref 0–285.2)

## 2025-03-17 NOTE — RESULT ENCOUNTER NOTE
The aldostorone and renin radio results are still pending, but everything else is normal.   Hopefully the results will come out at time of pt's OV.

## 2025-03-19 LAB
ALDOST SERPL-MCNC: 13.6 NG/DL (ref 0–30)
ALDOST/RENIN PLAS-RTO: >81.4 {RATIO} (ref 0–30)
RENIN PLAS-CCNC: <0.167 NG/ML/HR (ref 0.17–5.38)

## 2025-03-26 NOTE — TELEPHONE ENCOUNTER
----- Message from Dr. Bobo Lofton MD sent at 3/24/2025  7:53 AM EDT -----  Regarding: FW: elevated aldosterone / renin ratio  Aldosterone is not elevated.  I reviewed med list and she is not on any antihypertensives.     If she doesn't have htn and mauricio is not elevated then she doesn't have conn's syndrome and I dont need to see her.    People with conn's syndrome have hypertension, usually poorly controlled on multiple meds.  If not hypertensive you dont even need to check mauricio/renin.  ----- Message -----  From: Alexandria Whitaker PA  Sent: 3/23/2025   5:26 PM EDT  To: Bobo Lofton MD  Subject: elevated aldosterone / renin ratio               Hi Dr. Lofton, I was wondering if you could see this patient for elevated aldosterone / renin ratio.     She has had an adrenal adenoma on chart history:  Left adrenal adenoma  CT A/P W Cont 4/27/22: 1.5 cm left adrenal nodule, not significantly changed.  CT A/P WO Cont 6/30/22: Unchanged 1.9 cm left adrenal nodule with water density.    I ordered a CT scan to check for stability at her last visit with me- she has not yet had the CT scan done yet.   And I also ordered adrenal labs since she has not had them done before.  ----- Message -----  From: [facility] Incoming Lab For Copath Pdfs  Sent: 3/11/2025   2:31 AM EDT  To: RADHA Palacios

## 2025-03-26 NOTE — TELEPHONE ENCOUNTER
Message received by Dr. Lofton.   Will review at scheduled OV.     Will convert OV at as a telemed visit.

## (undated) DEVICE — TRAP SPEC COLL POLYP POLYSTYR --

## (undated) DEVICE — REM POLYHESIVE ADULT PATIENT RETURN ELECTRODE: Brand: VALLEYLAB

## (undated) DEVICE — SNARE POLYP M W27MMXL240CM OVL STIFF DISP CAPTIVATOR

## (undated) DEVICE — Device

## (undated) DEVICE — FLEX ADVANTAGE 1500CC: Brand: FLEX ADVANTAGE

## (undated) DEVICE — CATH IV SAFE STR 22GX1IN BLU -- PROTECTIV PLUS

## (undated) DEVICE — MEDI-VAC NON-CONDUCTIVE SUCTION TUBING: Brand: CARDINAL HEALTH

## (undated) DEVICE — AIRLIFE™ NASAL OXYGEN CANNULA CURVED, NONFLARED TIP WITH 14 FOOT (4.3 M) CRUSH-RESISTANT TUBING, OVER-THE-EAR STYLE: Brand: AIRLIFE™